# Patient Record
Sex: FEMALE | Race: BLACK OR AFRICAN AMERICAN | Employment: OTHER | URBAN - METROPOLITAN AREA
[De-identification: names, ages, dates, MRNs, and addresses within clinical notes are randomized per-mention and may not be internally consistent; named-entity substitution may affect disease eponyms.]

---

## 2017-05-15 ENCOUNTER — HOSPITAL ENCOUNTER (OUTPATIENT)
Dept: GENERAL RADIOLOGY | Age: 82
Discharge: HOME | End: 2017-05-15
Payer: MEDICARE

## 2017-05-15 ENCOUNTER — OFFICE VISIT (OUTPATIENT)
Dept: NEUROLOGY | Age: 82
End: 2017-05-15

## 2017-05-15 VITALS
HEART RATE: 72 BPM | WEIGHT: 144 LBS | DIASTOLIC BLOOD PRESSURE: 62 MMHG | SYSTOLIC BLOOD PRESSURE: 136 MMHG | HEIGHT: 63 IN | OXYGEN SATURATION: 97 % | BODY MASS INDEX: 25.52 KG/M2 | RESPIRATION RATE: 12 BRPM

## 2017-05-15 DIAGNOSIS — G60.8 IDIOPATHIC SMALL AND LARGE FIBER SENSORY NEUROPATHY: Primary | ICD-10-CM

## 2017-05-15 DIAGNOSIS — M54.16 LUMBAR BACK PAIN WITH RADICULOPATHY AFFECTING RIGHT LOWER EXTREMITY: ICD-10-CM

## 2017-05-15 DIAGNOSIS — M54.16 LUMBAR BACK PAIN WITH RADICULOPATHY AFFECTING LEFT LOWER EXTREMITY: ICD-10-CM

## 2017-05-15 DIAGNOSIS — G60.8 IDIOPATHIC SMALL AND LARGE FIBER SENSORY NEUROPATHY: ICD-10-CM

## 2017-05-15 DIAGNOSIS — G56.03 BILATERAL CARPAL TUNNEL SYNDROME: ICD-10-CM

## 2017-05-15 DIAGNOSIS — E11.42 DIABETIC PERIPHERAL NEUROPATHY ASSOCIATED WITH TYPE 2 DIABETES MELLITUS (HCC): ICD-10-CM

## 2017-05-15 DIAGNOSIS — E53.8 B12 DEFICIENCY: ICD-10-CM

## 2017-05-15 DIAGNOSIS — E55.9 VITAMIN D DEFICIENCY: ICD-10-CM

## 2017-05-15 DIAGNOSIS — M47.22 CERVICAL RADICULOPATHY DUE TO OSTEOARTHRITIS OF SPINE: ICD-10-CM

## 2017-05-15 PROCEDURE — 72052 X-RAY EXAM NECK SPINE 6/>VWS: CPT

## 2017-05-15 PROCEDURE — 72110 X-RAY EXAM L-2 SPINE 4/>VWS: CPT

## 2017-05-15 RX ORDER — GABAPENTIN 100 MG/1
100 CAPSULE ORAL 3 TIMES DAILY
Qty: 100 CAP | Refills: 9 | Status: SHIPPED | OUTPATIENT
Start: 2017-05-15 | End: 2018-06-27 | Stop reason: SDUPTHER

## 2017-05-15 RX ORDER — METFORMIN HYDROCHLORIDE 500 MG/1
TABLET, EXTENDED RELEASE ORAL
Refills: 0 | COMMUNITY
Start: 2017-04-03 | End: 2020-01-27 | Stop reason: ALTCHOICE

## 2017-05-15 NOTE — PATIENT INSTRUCTIONS

## 2017-05-15 NOTE — PROCEDURES
[facility] at [address] [facility] [address] [facility], [address] [facility]  Tel [phone]     Fax [phone]  Electrodiagnostic Study Report  Test Date:  5/15/2017    Patient: Jennifer Chen : 1932 Physician: Jazmin Montoya MD   Sex: Male  < Ref Phys: Cheo Patton     Clinical Indication: Patient is an 60-year-old -American female with numbness of both her hands and burning numbness in her feet, recent diagnosis of diabetes, EMG study to rule out neuropathy, rule out carpal tunnel syndrome, rule out cervical and lumbar radiculopathy. Patient was history of increasing neck pain worse on the left side. Impression: This study shows electrophysiologic evidence of    1). A very mild entrapment of the median nerve at the wrist on the right side consistent with a very mild carpal tunnel syndrome, as manifest only by the mildly prolonged distal sensory latency of the median nerve, with normal distal motor latency and normal EMG study of the abductor pollicis brevis on the right. 2). A mild length dependent distal axonal and probably mildly demyelinating polyneuropathy in both lower extremities consistent with possible various toxic, metabolic or acquired neuropathies, and possibly consistent with the patient's known history of diabetes. There was no clear evidence of either cervical or lumbar motor radiculopathy in the arms or legs as above on the basis of this study. Clinical correlation recommended, and correlation with imaging modalities and metabolic studies would also be of further diagnostic benefit. EMG & NCV Findings:  Evaluation of the left radial motor nerve showed prolonged distal onset latency (13.8 ms). The right median sensory nerve showed prolonged distal peak latency (3.6 ms). All remaining nerves (as indicated in the following tables) were within normal limits. All left vs. right side differences were within normal limits. All F Wave latencies were within normal limits.         Nerve Conduction Studies  Anti Sensory Summary Table     Stim Site NR Peak (ms) P-T Amp (µV) Site1 Site2 Delta-P (ms) Dist (cm) Vijay (m/s)   Right Median Anti Sensory (2nd Digit)  31.5°C   Wrist    3.6 29.5 Wrist 2nd Digit 3.6 14.0 39   Left Radial Anti Sensory (Base 1st Digit)  29.9°C   Wrist    2.2 23.7 Wrist Base 1st Digit 2.2 0.0    Right Sural Anti Sensory (Lat Mall)  30.3°C   Calf    3.5 13.6 Calf Lat Mall 3.5 14.0 40   Left Ulnar Anti Sensory (5th Digit)  30.1°C   Wrist    3.5 22.2 Wrist 5th Digit 3.5 14.0 40   Right Ulnar Anti Sensory (5th Digit)  31.7°C   Wrist    3.2 37.9 Wrist 5th Digit 3.2 14.0 44     Ortho Sensory Summary Table     Stim Site NR Peak (ms) P-T Amp (µV) Site1 Site2 Delta-P (ms) Dist (cm) Vijay (m/s)   Left Lateral Plantar Ortho Sensory (Med Malleolus)  30.5°C   Digit 5    3.4 7.4 Digit 5 Med Malleolus 3.4 0.0    Site 2    4.0 3.8        Right Medial Plantar Ortho Sensory (Med Malleolus)  30.2°C   Digit 1    4.2 4.8 Digit 1 Med Malleolus 4.2 0.0    Site 2    4.1 2.8          Motor Summary Table     Stim Site NR Onset (ms) O-P Amp (mV) Site1 Site2 Delta-0 (ms) Dist (cm) Vijay (m/s)   Right Fibular Motor (Ext Dig Brev)  30.1°C   Ankle    4.5 4.5 B Fib Ankle 7.1 30.0 42   B Fib    11.6 4.7 Poplt B Fib 2.2 10.0 45   Poplt    13.8 0.3        Left Median Motor (Abd Poll Brev)  30.3°C   Wrist    3.2 9.2 Elbow Wrist 4.5 25.5 61T   Elbow    7.7 8.2        Right Median Motor (Abd Poll Brev)  29.7°C   Wrist    3.5 7.5 Elbow Wrist 4.9 25.5 57T   Elbow    8.4 6.9        Left Radial Motor (Ext Ind Prop)  29.7°C   8cm    13.8         Right Tibial Motor (Abd Wan Brev)  30.5°C   Ankle    3.6 10.3 Knee Ankle 8.6 38.0 44   Knee    12.2 8.4        Right Ulnar Motor (Abd Dig Minimi)  32.1°C   Wrist    2.7 10.0 B Elbow Wrist 3.6 21.0 58   B Elbow    6.3 9.3 A Elbow B Elbow 1.7 10.0 59   A Elbow    8.0 9.5          Comparison Summary Table     Stim Site NR Peak (ms) P-T Amp (µV) Site1 Site2 Delta-P (ms)   Left Median/Ulnar Palm Comparison (Wrist - 8cm)  24.9°C   Median Palm    2.0 62.0 Median Palm Ulnar Palm 0.1   Ulnar Palm    2.1 10.4      Right Median/Ulnar Palm Comparison (Wrist - 8cm)  31.9°C   Median Palm    2.3 21.4 Median Palm Ulnar Palm 0.2   Ulnar Palm    2.1 9.0        F Wave Studies     NR F-Lat (ms) L-R F-Lat (ms)   Right Median (Mrkrs) (Abd Poll Brev)  31.7°C      30.71      EMG     Side Muscle Nerve Root Ins Act Fibs Psw Amp Dur Poly Recrt Comment   Right Abd Poll Brev Median C8-T1 Nml Nml Nml Nml Nml 0 Nml    Right 1stDorInt Ulnar C8-T1 Nml Nml Nml Nml Nml 0 Nml    Right Biceps Musculocut C5-6 Nml Nml Nml Nml Nml 0 Nml    Right Triceps Radial C6-7-8 Nml Nml Nml Nml Nml 0 Nml    Right Deltoid Axillary C5-6 Nml Nml Nml Nml Nml 0 Nml    Right Cervical Parasp Mid Rami C4-6 Nml Nml Nml        Right AntTibialis Dp Br Fibular L4-5 Nml Nml Nml Nml Nml 0 Nml    Right Gastroc Tibial S1-2 Nml Nml Nml Nml Nml 0 Nml    Right VastusLat Femoral L2-4 Nml Nml Nml Nml Nml 0 Nml    Right Lumbo Parasp Low Rami L5-S1 Nml Nml Nml        Right AbdHallucis MedPlantar S1-2 Nml Nml Nml Incr >12ms 1+ Nml    Right Ext Dig Brev Dp Br Fibular L5, S1 Nml Nml Nml Incr >12ms 1+ Nml    Left AntTibialis Dp Br Fibular L4-5 Nml Nml Nml Nml Nml 0 Nml    Left Gastroc Tibial S1-2 Nml Nml Nml Nml Nml 0 Nml    Left VastusLat Femoral L2-4 Nml Nml Nml Nml Nml 0 Nml    Left Lumbo Parasp Low Rami L5-S1 Nml Nml Nml        Left Ext Dig Brev Dp Br Fibular L5, S1 Nml Nml Nml Incr >12ms 1+ Nml    Left AbdHallucis MedPlantar S1-2 Nml Nml Nml Incr >12ms 1+ Nml    Left Abd Poll Brev Median C8-T1 Nml Nml Nml Nml Nml 0 Nml    Left 1stDorInt Ulnar C8-T1 Nml Nml Nml Nml Nml 0 Nml    Left Biceps Musculocut C5-6 Nml Nml Nml Nml Nml 0 Nml    Left Triceps Radial C6-7-8 Nml Nml Nml Nml Nml 0 Nml    Left Deltoid Axillary C5-6 Nml Nml Nml Nml Nml 0 Nml    Left Cervical Parasp Mid Rami C4-6 Nml Nml Nml Waveforms:                                              __________________  Dolores Castañeda M.D.

## 2017-05-15 NOTE — PROGRESS NOTES
Consult  REFERRED BY:  Arturo Lafleur MD    CHIEF COMPLAINT: Numbness in hands and feet      Subjective:     Malcolm Gutierrez is a 80 y.o. right-handed -American female seen today as a new patient to me for evaluation of a new progressive problem of increasing numbness in her hands and feet at the request of Dr. Alvaro Hand. Patient states that she was recently found to be diabetic about 2 months ago and started on metformin. She had a hemoglobin A1c of 6.8 in August of last year and mildly elevated blood sugars on the chart. She has no family history of diabetes and no prior history of diabetes. She says the numbness seems to be worse in the left hand involving her fourth and fifth finger, and in both feet mainly on the bottom of the feet and more in the little toe in the right foot and the big toe and the left foot. She frequently crosses her legs, and we advised her to try to avoid this. It is associated with a somewhat burning pain on the bottom of her feet at times, but she sleeps pretty well. She does not really have that much back pain but does have some neck pain that seems to radiate more to the left side and seems to have some fullness in the left supraclavicular area. She has had no recent trauma, fever, meningismus or evidence of any causes for the numbness. She has no toxin exposure, chemical exposure, or family history of similar problems. Her bowel and bladder function remained stable. She has had no cranial nerve symptoms and no major headaches. She continues to remain mentally and physically active but does not take any vitamins. She eats well and has good nutrition and tries to remain mentally and physically active. She has no major gait problems or balance problems except for the arthritis in her knees bilaterally, with the right having previous knee surgery one year ago.     Past Medical History:   Diagnosis Date    Arthritis     GERD (gastroesophageal reflux disease)     Hypertension     Psychiatric disorder     anxiety      Past Surgical History:   Procedure Laterality Date    HX CHOLECYSTECTOMY      HX ÓSCAR AND BSO      CO COLONOSCOPY FLX DX W/COLLJ SPEC WHEN PFRMD  11/21/2013         UPPER GI ENDOSCOPY,BIOPSY  11/3/2015          Family History   Problem Relation Age of Onset    No Known Problems Mother     No Known Problems Father     Heart Disease Child     Arthritis-osteo Child     Hypertension Child       Social History   Substance Use Topics    Smoking status: Never Smoker    Smokeless tobacco: Never Used    Alcohol use No         Current Outpatient Prescriptions:     metFORMIN ER (GLUCOPHAGE XR) 500 mg tablet, TK 1 T PO ONCE QD WITH DINNER, Disp: , Rfl: 0    gabapentin (NEURONTIN) 100 mg capsule, Take 1 Cap by mouth three (3) times daily. , Disp: 100 Cap, Rfl: 9    hydrochlorothiazide (HYDRODIURIL) 25 mg tablet, Take 1 Tab by mouth daily. Restart when Blood Pressure is greater than 120/80, Disp: , Rfl:     omeprazole (PRILOSEC) 20 mg capsule, Take 20 mg by mouth daily. , Disp: , Rfl:     citalopram (CELEXA) 20 mg tablet, Take 20 mg by mouth daily. , Disp: , Rfl:     atorvastatin (LIPITOR) 20 mg tablet, Take 20 mg by mouth daily. , Disp: , Rfl:         Allergies   Allergen Reactions    Codeine Other (comments)     fainting        Review of Systems:  A comprehensive review of systems was negative except for: Constitutional: positive for fatigue and malaise  Ears, nose, mouth, throat, and face: positive for hearing loss  Musculoskeletal: positive for myalgias, arthralgias, stiff joints and neck pain  Neurological: positive for paresthesia and gait problems  Behvioral/Psych: positive for depression   Vitals:    05/15/17 1019   BP: 136/62   Pulse: 72   Resp: 12   SpO2: 97%   Weight: 144 lb (65.3 kg)   Height: 5' 3\" (1.6 m)     Objective:     I      NEUROLOGICAL EXAM:    Appearance:   The patient is well developed, well nourished, provides a coherent history and is in no acute distress. Mental Status: Oriented to time, place and person, and the president, cognitive function is normal and speech is fluent and no aphasia or dysarthria. Mood and affect appropriate but seems mildly depressed . Cranial Nerves:   Intact visual fields. Fundi are benign. BEKAH, EOM's full, no nystagmus, no ptosis. Facial sensation is normal. Corneal reflexes are not tested. Facial movement is symmetric. Hearing is abnormal bilaterally. Palate is midline with normal sternocleidomastoid and trapezius muscles are normal. Tongue is midline. Neck without meningismus or bruits  Patient has mild pain on range of motion of the neck, slightly more rotation to the left  Patient seems to have a slight fullness in the supraclavicular region on the left side, questionably some soft tissue swelling or lipoma   Patient's temporal arteries are not tender or enlarged  Patient's TMJ areas are not tender or enlarged    Motor:  5/5 strength in upper and lower proximal and distal muscles. Normal bulk and tone. No fasciculations. Reflexes:   Deep tendon reflexes 1+/4 and symmetrical.  No babinski or clonus present  No clear Tinel's over the median nerves bilaterally or peroneal nerves at the fibular heads    Sensory:   Normal to touch, pinprick and vibration. DSS is intact   Gait:  Normal gait except patient walks with a mild limp due to arthritis in her knees and previous right knee surgery . Tremor:   No tremor noted. Cerebellar:  Mildly abnormal Romberg and tandem cerebellar signs present. Neurovascular:  Normal heart sounds and regular rhythm, peripheral pulses decreased, and no carotid bruits. Assessment:       ICD-10-CM ICD-9-CM    1.  Idiopathic small and large fiber sensory neuropathy G60.8 356.4 metFORMIN ER (GLUCOPHAGE XR) 500 mg tablet      EMG LIMITED      EMG NCV MOTOR WO F/WAVE PER NERVE      EMG NCV SENSORY PER NERVE      ARLETTE COMPREHENSIVE PLUS PANEL      VITAMIN B12 & FOLATE VITAMIN D, 25 HYROXY PANEL      IMMUNOELECTROPHORESIS (IMMUNOFIX.)      HEMOGLOBIN A1C WITH EAG      GM1 ANTIBODY IGG, IGM      GLIADIN ABS, IGA AND IGG      MYELIN ASSOC GLYCOPROTEIN AB, IGM      SED RATE (ESR)      RA + CCP ABS      CBC WITH AUTOMATED DIFF      CK      ANTINEURONAL CELL AB      gabapentin (NEURONTIN) 100 mg capsule      XR SPINE CERV W OBL/FLEX/EXT MIN 6 V COMP      XR SPINE LUMB MIN 4 V   2. Diabetic peripheral neuropathy associated with type 2 diabetes mellitus (HCC) E11.42 250.60 metFORMIN ER (GLUCOPHAGE XR) 500 mg tablet     357. 2 EMG LIMITED      EMG NCV MOTOR WO F/WAVE PER NERVE      EMG NCV SENSORY PER NERVE      ARLETTE COMPREHENSIVE PLUS PANEL      VITAMIN B12 & FOLATE      VITAMIN D, 25 HYROXY PANEL      IMMUNOELECTROPHORESIS (IMMUNOFIX.)      HEMOGLOBIN A1C WITH EAG      GM1 ANTIBODY IGG, IGM      GLIADIN ABS, IGA AND IGG      MYELIN ASSOC GLYCOPROTEIN AB, IGM      SED RATE (ESR)      RA + CCP ABS      CBC WITH AUTOMATED DIFF      CK      ANTINEURONAL CELL AB      gabapentin (NEURONTIN) 100 mg capsule      XR SPINE CERV W OBL/FLEX/EXT MIN 6 V COMP      XR SPINE LUMB MIN 4 V   3. Vitamin D deficiency E55.9 268.9 metFORMIN ER (GLUCOPHAGE XR) 500 mg tablet      EMG LIMITED      EMG NCV MOTOR WO F/WAVE PER NERVE      EMG NCV SENSORY PER NERVE      ARLETTE COMPREHENSIVE PLUS PANEL      VITAMIN B12 & FOLATE      VITAMIN D, 25 HYROXY PANEL      IMMUNOELECTROPHORESIS (IMMUNOFIX.)      HEMOGLOBIN A1C WITH EAG      GM1 ANTIBODY IGG, IGM      GLIADIN ABS, IGA AND IGG      MYELIN ASSOC GLYCOPROTEIN AB, IGM      SED RATE (ESR)      RA + CCP ABS      CBC WITH AUTOMATED DIFF      CK      ANTINEURONAL CELL AB      gabapentin (NEURONTIN) 100 mg capsule      XR SPINE CERV W OBL/FLEX/EXT MIN 6 V COMP      XR SPINE LUMB MIN 4 V   4.  B12 deficiency E53.8 266.2 metFORMIN ER (GLUCOPHAGE XR) 500 mg tablet      EMG LIMITED      EMG NCV MOTOR WO F/WAVE PER NERVE      EMG NCV SENSORY PER NERVE      ARLETTE COMPREHENSIVE PLUS PANEL      VITAMIN B12 & FOLATE      VITAMIN D, 25 HYROXY PANEL      IMMUNOELECTROPHORESIS (IMMUNOFIX.)      HEMOGLOBIN A1C WITH EAG      GM1 ANTIBODY IGG, IGM      GLIADIN ABS, IGA AND IGG      MYELIN ASSOC GLYCOPROTEIN AB, IGM      SED RATE (ESR)      RA + CCP ABS      CBC WITH AUTOMATED DIFF      CK      ANTINEURONAL CELL AB      gabapentin (NEURONTIN) 100 mg capsule      XR SPINE CERV W OBL/FLEX/EXT MIN 6 V COMP      XR SPINE LUMB MIN 4 V   5. Lumbar back pain with radiculopathy affecting right lower extremity M54.17 724.4 metFORMIN ER (GLUCOPHAGE XR) 500 mg tablet      EMG LIMITED      EMG NCV MOTOR WO F/WAVE PER NERVE      EMG NCV SENSORY PER NERVE      ARLETTE COMPREHENSIVE PLUS PANEL      VITAMIN B12 & FOLATE      VITAMIN D, 25 HYROXY PANEL      IMMUNOELECTROPHORESIS (IMMUNOFIX.)      HEMOGLOBIN A1C WITH EAG      GM1 ANTIBODY IGG, IGM      GLIADIN ABS, IGA AND IGG      MYELIN ASSOC GLYCOPROTEIN AB, IGM      SED RATE (ESR)      RA + CCP ABS      CBC WITH AUTOMATED DIFF      CK      ANTINEURONAL CELL AB      gabapentin (NEURONTIN) 100 mg capsule      XR SPINE CERV W OBL/FLEX/EXT MIN 6 V COMP      XR SPINE LUMB MIN 4 V   6. Lumbar back pain with radiculopathy affecting left lower extremity M54.17 724.4 metFORMIN ER (GLUCOPHAGE XR) 500 mg tablet      EMG LIMITED      EMG NCV MOTOR WO F/WAVE PER NERVE      EMG NCV SENSORY PER NERVE      ARLETTE COMPREHENSIVE PLUS PANEL      VITAMIN B12 & FOLATE      VITAMIN D, 25 HYROXY PANEL      IMMUNOELECTROPHORESIS (IMMUNOFIX.)      HEMOGLOBIN A1C WITH EAG      GM1 ANTIBODY IGG, IGM      GLIADIN ABS, IGA AND IGG      MYELIN ASSOC GLYCOPROTEIN AB, IGM      SED RATE (ESR)      RA + CCP ABS      CBC WITH AUTOMATED DIFF      CK      ANTINEURONAL CELL AB      gabapentin (NEURONTIN) 100 mg capsule      XR SPINE CERV W OBL/FLEX/EXT MIN 6 V COMP      XR SPINE LUMB MIN 4 V   7.  Cervical radiculopathy due to osteoarthritis of spine M47.22 721.0 metFORMIN ER (GLUCOPHAGE XR) 500 mg tablet EMG LIMITED      EMG NCV MOTOR WO F/WAVE PER NERVE      EMG NCV SENSORY PER NERVE      ARLETTE COMPREHENSIVE PLUS PANEL      VITAMIN B12 & FOLATE      VITAMIN D, 25 HYROXY PANEL      IMMUNOELECTROPHORESIS (IMMUNOFIX.)      HEMOGLOBIN A1C WITH EAG      GM1 ANTIBODY IGG, IGM      GLIADIN ABS, IGA AND IGG      MYELIN ASSOC GLYCOPROTEIN AB, IGM      SED RATE (ESR)      RA + CCP ABS      CBC WITH AUTOMATED DIFF      CK      ANTINEURONAL CELL AB      gabapentin (NEURONTIN) 100 mg capsule      XR SPINE CERV W OBL/FLEX/EXT MIN 6 V COMP      XR SPINE LUMB MIN 4 V   8. Bilateral carpal tunnel syndrome G56.03 354.0 metFORMIN ER (GLUCOPHAGE XR) 500 mg tablet      EMG LIMITED      EMG NCV MOTOR WO F/WAVE PER NERVE      EMG NCV SENSORY PER NERVE      ARLETTE COMPREHENSIVE PLUS PANEL      VITAMIN B12 & FOLATE      VITAMIN D, 25 HYROXY PANEL      IMMUNOELECTROPHORESIS (IMMUNOFIX.)      HEMOGLOBIN A1C WITH EAG      GM1 ANTIBODY IGG, IGM      GLIADIN ABS, IGA AND IGG      MYELIN ASSOC GLYCOPROTEIN AB, IGM      SED RATE (ESR)      RA + CCP ABS      CBC WITH AUTOMATED DIFF      CK      ANTINEURONAL CELL AB      gabapentin (NEURONTIN) 100 mg capsule      XR SPINE CERV W OBL/FLEX/EXT MIN 6 V COMP      XR SPINE LUMB MIN 4 V         Plan:     Patient had an EMG study today that did document a very mild distal axonal neuropathy that would be consistent probably with her history of diabetes, need to rule out other causes. Patient had complete metabolic studies done today to rule out other treatable causes of neuropathy  Patient's EMG study did not document carpal tunnel syndrome except on the right side which was minimal  No clear evidence of motor cervical radiculopathy, and patient encouraged not to cross her legs. Patient encouraged to take a multivitamin every day and vitamin D, remain mentally and physically active  Patient will get cervical spine x-rays and lumbar spine x-rays to rule out other treatable causes of her symptoms.   She was given Neurontin 100 mg 3 times a day take as needed for the pain. We will see her back again in 3-6 months time or earlier as needed and patient was advised of the side effects of Neurontin as far as dizziness, sedation, or any side effects to call us immediately. Her condition also discussed with her daughter in detail today. We did advise her at the single most important thing for controlling her neuropathy was to control her blood sugars. Signed By: Oziel Dietz MD     May 15, 2017       CC: Guillermo De Souza MD  FAX: [phone]    This note will not be viewable in [address]

## 2017-05-15 NOTE — MR AVS SNAPSHOT
Visit Information Date & Time Provider Department Dept. Phone Encounter #  
 5/15/2017 10:00 AM Daniele Adames MD [facility] [phone] [id number] Follow-up Instructions Return in about 6 months (around 11/15/2017). Upcoming Health Maintenance Date Due DTaP/Tdap/Td series (1 - Tdap) 9/22/1953 ZOSTER VACCINE AGE 60> 9/22/1992 GLAUCOMA SCREENING Q2Y 9/22/1997 OSTEOPOROSIS SCREENING (DEXA) 9/22/1997 Pneumococcal 65+ Low/Medium Risk (1 of 2 - PCV13) 9/22/1997 MEDICARE YEARLY EXAM 9/22/1997 INFLUENZA AGE 9 TO ADULT 8/1/2017 Allergies as of 5/15/2017  Review Complete On: 5/15/2017 By: Daniele Adames MD  
  
 Severity Noted Reaction Type Reactions Codeine High 11/20/2013    Other (comments)  
 fainting Current Immunizations  Never Reviewed No immunizations on file. Not reviewed this visit You Were Diagnosed With   
  
 Codes Comments Idiopathic small and large fiber sensory neuropathy    -  Primary ICD-10-CM: G60.8 ICD-9-CM: 356.4 Diabetic peripheral neuropathy associated with type 2 diabetes mellitus (HCC)     ICD-10-CM: E11.42 
ICD-9-CM: 250.60, 357.2 Vitamin D deficiency     ICD-10-CM: E55.9 ICD-9-CM: 268.9 B12 deficiency     ICD-10-CM: E53.8 ICD-9-CM: 266.2 Lumbar back pain with radiculopathy affecting right lower extremity     ICD-10-CM: M54.17 ICD-9-CM: 724.4 Lumbar back pain with radiculopathy affecting left lower extremity     ICD-10-CM: M54.17 ICD-9-CM: 724.4 Cervical radiculopathy due to osteoarthritis of spine     ICD-10-CM: M47.22 
ICD-9-CM: 721.0 Bilateral carpal tunnel syndrome     ICD-10-CM: G56.03 
ICD-9-CM: 354.0 Vitals BP Pulse Resp Height(growth percentile) Weight(growth percentile) SpO2  
 136/62 72 12 5' 3\" (1.6 m) 144 lb (65.3 kg) 97% BMI OB Status Smoking Status 25.51 kg/m2 Hysterectomy Never Smoker Vitals History BMI and BSA Data Body Mass Index Body Surface Area 25.51 kg/m 2 1.7 m 2 Preferred Pharmacy Pharmacy Name Phone [facility] [phone] [facility] [phone] Your Updated Medication List  
  
   
This list is accurate as of: 5/15/17 10:52 AM.  Always use your most recent med list.  
  
  
  
  
 atorvastatin 20 mg tablet Commonly known as:  LIPITOR Take 20 mg by mouth daily. citalopram 20 mg tablet Commonly known as:  Gaurav Shows Take 20 mg by mouth daily. gabapentin 100 mg capsule Commonly known as:  NEURONTIN Take 1 Cap by mouth three (3) times daily. hydroCHLOROthiazide 25 mg tablet Commonly known as:  HYDRODIURIL Take 1 Tab by mouth daily. Restart when Blood Pressure is greater than 120/80  
  
 metFORMIN  mg tablet Commonly known as:  GLUCOPHAGE XR  
TK 1 T PO ONCE QD WITH DINNER  
  
 omeprazole 20 mg capsule Commonly known as:  PRILOSEC Take 20 mg by mouth daily. Prescriptions Sent to Pharmacy Refills  
 gabapentin (NEURONTIN) 100 mg capsule 9 Sig: Take 1 Cap by mouth three (3) times daily. Class: Normal  
 Pharmacy: [facility] [address] [place] [address] RD AT [address] [place] [address] Ph #: [phone] Route: Oral  
  
We Performed the Following ARLETTE COMPREHENSIVE PLUS PANEL [[id number] Custom] ANTINEURONAL CELL AB [id number] CPT(R)] CBC WITH AUTOMATED DIFF [47499 CPT(R)] CK [id number] CPT(R)] GLIADIN ABS, IGA AND IGG [[id number] Custom] GM1 ANTIBODY IGG, IGM [37056 CPT(R)] HEMOGLOBIN A1C WITH EAG [71880 CPT(R)] IMMUNOELECTROPHORESIS [facility].) [id number] CPT(R)] MYELIN ASSOC GLYCOPROTEIN AB, IGM [id number] CPT(R)]   
 RA + CCP ABS [[id number] Custom] SED RATE (ESR) [id number] CPT(R)] VITAMIN B12 & FOLATE [72335 CPT(R)] VITAMIN D, 25 HYROXY PANEL [[id number] Custom] Follow-up Instructions Return in about 6 months (around 11/15/2017). To-Do List   
 05/15/2017 Neurology:  EMG LIMITED   
  
 05/15/2017 Neurology:  EMG NCV MOTOR WO F/WAVE PER NERVE   
  
 05/15/2017 Neurology:  EMG NCV SENSORY PER NERVE   
  
 05/15/2017 Imaging:  XR SPINE CERV W OBL/FLEX/EXT MIN 6 V COMP   
  
 05/15/2017 Imaging:  XR SPINE LUMB MIN 4 V Patient Instructions A Healthy Lifestyle: Care Instructions Your Care Instructions A healthy lifestyle can help you feel good, stay at a healthy weight, and have plenty of energy for both work and play. A healthy lifestyle is something you can share with your whole family. A healthy lifestyle also can lower your risk for serious health problems, such as high blood pressure, heart disease, and diabetes. You can follow a few steps listed below to improve your health and the health of your family. Follow-up care is a key part of your treatment and safety. Be sure to make and go to all appointments, and call your doctor if you are having problems. Its also a good idea to know your test results and keep a list of the medicines you take. How can you care for yourself at home? · Do not eat too much sugar, fat, or fast foods. You can still have dessert and treats now and then. The goal is moderation. · Start small to improve your eating habits. Pay attention to portion sizes, drink less juice and soda pop, and eat more fruits and vegetables. ¨ Eat a healthy amount of food. A 3-ounce serving of meat, for example, is about the size of a deck of cards. Fill the rest of your plate with vegetables and whole grains. ¨ Limit the amount of soda and sports drinks you have every day. Drink more water when you are thirsty. ¨ Eat at least 5 servings of fruits and vegetables every day.  It may seem like a lot, but it is not hard to reach this goal. A serving or helping is 1 piece of fruit, 1 cup of vegetables, or 2 cups of leafy, raw vegetables. Have an apple or some carrot sticks as an afternoon snack instead of a candy bar. Try to have fruits and/or vegetables at every meal. 
· Make exercise part of your daily routine. You may want to start with simple activities, such as walking, bicycling, or slow swimming. Try to be active 30 to 60 minutes every day. You do not need to do all 30 to 60 minutes all at once. For example, you can exercise 3 times a day for 10 or 20 minutes. Moderate exercise is safe for most people, but it is always a good idea to talk to your doctor before starting an exercise program. 
· Keep moving. Alden Crooked the lawn, work in the garden, or [place]. Take the stairs instead of the elevator at work. · If you smoke, quit. People who smoke have an increased risk for heart attack, stroke, cancer, and other lung illnesses. Quitting is hard, but there are ways to boost your chance of quitting tobacco for good. ¨ Use nicotine gum, patches, or lozenges. ¨ Ask your doctor about stop-smoking programs and medicines. ¨ Keep trying. In addition to reducing your risk of diseases in the future, you will notice some benefits soon after you stop using tobacco. If you have shortness of breath or asthma symptoms, they will likely get better within a few weeks after you quit. · Limit how much alcohol you drink. Moderate amounts of alcohol (up to 2 drinks a day for men, 1 drink a day for women) are okay. But drinking too much can lead to liver problems, high blood pressure, and other health problems. Family health If you have a family, there are many things you can do together to improve your health. · Eat meals together as a family as often as possible. · Eat healthy foods. This includes fruits, vegetables, lean meats and dairy, and whole grains. · Include your family in your fitness plan.  Most people think of activities such as jogging or tennis as the way to fitness, but there are many ways you and your family can be more active. Anything that makes you breathe hard and gets your heart pumping is exercise. Here are some tips: 
¨ Walk to do errands or to take your child to school or the bus. ¨ Go for a family bike ride after dinner instead of watching TV. Where can you learn more? Go to http://anna-carlos.info/. Enter J470 in the search box to learn more about \"A Healthy Lifestyle: Care Instructions. \" Current as of: July 26, 2016 Content Version: 11.2 © 7248-5434 [employer]. Care instructions adapted under license by [employer] (which disclaims liability or warranty for this information). If you have questions about a medical condition or this instruction, always ask your healthcare professional. [address] any warranty or liability for your use of this information. Introducing [facility]! Tanis Epley introduces Kamibu patient portal. Now you can access parts of your medical record, email your doctor's office, and request medication refills online. 1. In your internet browser, go to [email or web address] 2. Click on the First Time User? Click Here link in the Sign In box. You will see the New Member Sign Up page. 3. Enter your Kamibu Access Code exactly as it appears below. You will not need to use this code after youve completed the sign-up process. If you do not sign up before the expiration date, you must request a new code. · Kam[employer]u Access Code: [email or web address] Expires: 8/13/2017 10:09 AM 
 
4. Enter the last four digits of your Social Security Number (xxxx) and Date of Birth (mm/dd/yyyy) as indicated and click Submit. You will be taken to the next sign-up page. 5. Create a Kamibu ID. This will be your Kamibu login ID and cannot be changed, so think of one that is secure and easy to remember. 6. Create a FoxyTasks password. You can change your password at any time. 7. Enter your Password Reset Question and Answer. This can be used at a later time if you forget your password. 8. Enter your e-mail address. You will receive e-mail notification when new information is available in [address] 9. Click Sign Up. You can now view and download portions of your medical record. 10. Click the Download Summary menu link to download a portable copy of your medical information. If you have questions, please visit the Frequently Asked Questions section of the [employer] website. Remember, FoxyTasks is NOT to be used for urgent needs. For medical emergencies, dial 911. Now available from your iPhone and Android! Please provide this summary of care documentation to your next provider. Your primary care clinician is listed as Brenda Olsen. If you have any questions after today's visit, please call [phone].

## 2017-05-15 NOTE — LETTER
5/15/2017 1:11 PM 
 
Patient:  Linda Shepherd YOB: 1932 Date of Visit: 5/15/2017 Dear No Recipients: Thank you for referring Ms. Linda Shepherd to me for evaluation/treatment. Below are the relevant portions of my assessment and plan of care. Consult REFERRED BY: 
Sebastian Quevedo MD 
 
CHIEF COMPLAINT: Numbness in hands and feet Subjective:  
 
Linda Shepherd is a 80 y.o. right-handed -American female seen today as a new patient to me for evaluation of a new progressive problem of increasing numbness in her hands and feet at the request of Dr. Tres Stone. Patient states that she was recently found to be diabetic about 2 months ago and started on metformin. She had a hemoglobin A1c of 6.8 in August of last year and mildly elevated blood sugars on the chart. She has no family history of diabetes and no prior history of diabetes. She says the numbness seems to be worse in the left hand involving her fourth and fifth finger, and in both feet mainly on the bottom of the feet and more in the little toe in the right foot and the big toe and the left foot. She frequently crosses her legs, and we advised her to try to avoid this. It is associated with a somewhat burning pain on the bottom of her feet at times, but she sleeps pretty well. She does not really have that much back pain but does have some neck pain that seems to radiate more to the left side and seems to have some fullness in the left supraclavicular area. She has had no recent trauma, fever, meningismus or evidence of any causes for the numbness. She has no toxin exposure, chemical exposure, or family history of similar problems. Her bowel and bladder function remained stable. She has had no cranial nerve symptoms and no major headaches. She continues to remain mentally and physically active but does not take any vitamins.   She eats well and has good nutrition and tries to remain mentally and physically active. She has no major gait problems or balance problems except for the arthritis in her knees bilaterally, with the right having previous knee surgery one year ago. Past Medical History:  
Diagnosis Date  Arthritis  GERD (gastroesophageal reflux disease)  Hypertension  Psychiatric disorder   
 anxiety Past Surgical History:  
Procedure Laterality Date  HX CHOLECYSTECTOMY  HX ÓSCAR AND BSO  OH COLONOSCOPY FLX DX W/COLLJ SPEC WHEN PFRMD  11/21/2013  UPPER GI ENDOSCOPY,BIOPSY  11/3/2015 Family History Problem Relation Age of Onset  No Known Problems Mother  No Known Problems Father  Heart Disease Child  Arthritis-osteo Child  Hypertension Child Social History Substance Use Topics  Smoking status: Never Smoker  Smokeless tobacco: Never Used  Alcohol use No  
   
 
Current Outpatient Prescriptions:  
  metFORMIN ER (GLUCOPHAGE XR) 500 mg tablet, TK 1 T PO ONCE QD WITH DINNER, Disp: , Rfl: 0 
  gabapentin (NEURONTIN) 100 mg capsule, Take 1 Cap by mouth three (3) times daily. , Disp: 100 Cap, Rfl: 9 
  hydrochlorothiazide (HYDRODIURIL) 25 mg tablet, Take 1 Tab by mouth daily. Restart when Blood Pressure is greater than 120/80, Disp: , Rfl:  
  omeprazole (PRILOSEC) 20 mg capsule, Take 20 mg by mouth daily. , Disp: , Rfl:  
  citalopram (CELEXA) 20 mg tablet, Take 20 mg by mouth daily. , Disp: , Rfl:  
  atorvastatin (LIPITOR) 20 mg tablet, Take 20 mg by mouth daily. , Disp: , Rfl:  
 
 
 
Allergies Allergen Reactions  Codeine Other (comments)  
  fainting Review of Systems: A comprehensive review of systems was negative except for: Constitutional: positive for fatigue and malaise Ears, nose, mouth, throat, and face: positive for hearing loss Musculoskeletal: positive for myalgias, arthralgias, stiff joints and neck pain Neurological: positive for paresthesia and gait problems Behvioral/Psych: positive for depression Vitals:  
 05/15/17 1019 BP: 136/62 Pulse: 72 Resp: 12 SpO2: 97% Weight: 144 lb (65.3 kg) Height: 5' 3\" (1.6 m) Objective: I 
 
 
NEUROLOGICAL EXAM: 
 
Appearance: The patient is well developed, well nourished, provides a coherent history and is in no acute distress. Mental Status: Oriented to time, place and person, and the president, cognitive function is normal and speech is fluent and no aphasia or dysarthria. Mood and affect appropriate but seems mildly depressed . Cranial Nerves:   Intact visual fields. Fundi are benign. BEKAH, EOM's full, no nystagmus, no ptosis. Facial sensation is normal. Corneal reflexes are not tested. Facial movement is symmetric. Hearing is abnormal bilaterally. Palate is midline with normal sternocleidomastoid and trapezius muscles are normal. Tongue is midline. Neck without meningismus or bruits Patient has mild pain on range of motion of the neck, slightly more rotation to the left Patient seems to have a slight fullness in the supraclavicular region on the left side, questionably some soft tissue swelling or lipoma Patient's temporal arteries are not tender or enlarged Patient's TMJ areas are not tender or enlarged Motor:  5/5 strength in upper and lower proximal and distal muscles. Normal bulk and tone. No fasciculations. Reflexes:   Deep tendon reflexes 1+/4 and symmetrical. 
No babinski or clonus present No clear Tinel's over the median nerves bilaterally or peroneal nerves at the fibular heads Sensory:   Normal to touch, pinprick and vibration. DSS is intact Gait:  Normal gait except patient walks with a mild limp due to arthritis in her knees and previous right knee surgery . Tremor:   No tremor noted. Cerebellar:  Mildly abnormal Romberg and tandem cerebellar signs present. Neurovascular:  Normal heart sounds and regular rhythm, peripheral pulses decreased, and no carotid bruits. Assessment: ICD-10-CM ICD-9-CM 1. Idiopathic small and large fiber sensory neuropathy G60.8 356.4 metFORMIN ER (GLUCOPHAGE XR) 500 mg tablet EMG LIMITED  
   EMG NCV MOTOR WO F/WAVE PER NERVE  
   EMG NCV SENSORY PER NERVE  
   ARLETTE COMPREHENSIVE PLUS PANEL  
   VITAMIN B12 & FOLATE  
   VITAMIN D, 25 HYROXY PANEL  
   IMMUNOELECTROPHORESIS (IMMUNOFIX.) HEMOGLOBIN A1C WITH EAG  
   GM1 ANTIBODY IGG, IGM  
   GLIADIN ABS, IGA AND IGG MYELIN ASSOC GLYCOPROTEIN AB, IGM  
   SED RATE (ESR)  
   RA + CCP ABS  
   CBC WITH AUTOMATED DIFF  
   CK ANTINEURONAL CELL AB  
   gabapentin (NEURONTIN) 100 mg capsule XR SPINE CERV W OBL/FLEX/EXT MIN 6 V COMP  
   XR SPINE LUMB MIN 4 V  
2. Diabetic peripheral neuropathy associated with type 2 diabetes mellitus (HCC) E11.42 250.60 metFORMIN ER (GLUCOPHAGE XR) 500 mg tablet  
  357. 2 EMG LIMITED  
   EMG NCV MOTOR WO F/WAVE PER NERVE  
   EMG NCV SENSORY PER NERVE  
   ARLETTE COMPREHENSIVE PLUS PANEL  
   VITAMIN B12 & FOLATE  
   VITAMIN D, 25 HYROXY PANEL  
   IMMUNOELECTROPHORESIS (IMMUNOFIX.) HEMOGLOBIN A1C WITH EAG  
   GM1 ANTIBODY IGG, IGM  
   GLIADIN ABS, IGA AND IGG MYELIN ASSOC GLYCOPROTEIN AB, IGM  
   SED RATE (ESR)  
   RA + CCP ABS  
   CBC WITH AUTOMATED DIFF  
   CK ANTINEURONAL CELL AB  
   gabapentin (NEURONTIN) 100 mg capsule XR SPINE CERV W OBL/FLEX/EXT MIN 6 V COMP  
   XR SPINE LUMB MIN 4 V  
3. Vitamin D deficiency E55.9 268.9 metFORMIN ER (GLUCOPHAGE XR) 500 mg tablet EMG LIMITED  
   EMG NCV MOTOR WO F/WAVE PER NERVE  
   EMG NCV SENSORY PER NERVE  
   ARLETTE COMPREHENSIVE PLUS PANEL  
   VITAMIN B12 & FOLATE  
   VITAMIN D, 25 HYROXY PANEL  
   IMMUNOELECTROPHORESIS (IMMUNOFIX.)    HEMOGLOBIN A1C WITH EAG  
   GM1 ANTIBODY IGG, IGM  
   GLIADIN ABS, IGA AND IGG  
 MYELIN ASSOC GLYCOPROTEIN AB, IGM  
   SED RATE (ESR)  
   RA + CCP ABS  
   CBC WITH AUTOMATED DIFF  
   CK ANTINEURONAL CELL AB  
   gabapentin (NEURONTIN) 100 mg capsule XR SPINE CERV W OBL/FLEX/EXT MIN 6 V COMP  
   XR SPINE LUMB MIN 4 V  
4. B12 deficiency E53.8 266.2 metFORMIN ER (GLUCOPHAGE XR) 500 mg tablet EMG LIMITED  
   EMG NCV MOTOR WO F/WAVE PER NERVE  
   EMG NCV SENSORY PER NERVE  
   ARLETTE COMPREHENSIVE PLUS PANEL  
   VITAMIN B12 & FOLATE  
   VITAMIN D, 25 HYROXY PANEL  
   IMMUNOELECTROPHORESIS (IMMUNOFIX.) HEMOGLOBIN A1C WITH EAG  
   GM1 ANTIBODY IGG, IGM  
   GLIADIN ABS, IGA AND IGG MYELIN ASSOC GLYCOPROTEIN AB, IGM  
   SED RATE (ESR)  
   RA + CCP ABS  
   CBC WITH AUTOMATED DIFF  
   CK ANTINEURONAL CELL AB  
   gabapentin (NEURONTIN) 100 mg capsule XR SPINE CERV W OBL/FLEX/EXT MIN 6 V COMP  
   XR SPINE LUMB MIN 4 V  
5. Lumbar back pain with radiculopathy affecting right lower extremity M54.17 724.4 metFORMIN ER (GLUCOPHAGE XR) 500 mg tablet EMG LIMITED  
   EMG NCV MOTOR WO F/WAVE PER NERVE  
   EMG NCV SENSORY PER NERVE  
   ARLETTE COMPREHENSIVE PLUS PANEL  
   VITAMIN B12 & FOLATE  
   VITAMIN D, 25 HYROXY PANEL  
   IMMUNOELECTROPHORESIS (IMMUNOFIX.) HEMOGLOBIN A1C WITH EAG  
   GM1 ANTIBODY IGG, IGM  
   GLIADIN ABS, IGA AND IGG MYELIN ASSOC GLYCOPROTEIN AB, IGM  
   SED RATE (ESR)  
   RA + CCP ABS  
   CBC WITH AUTOMATED DIFF  
   CK ANTINEURONAL CELL AB  
   gabapentin (NEURONTIN) 100 mg capsule XR SPINE CERV W OBL/FLEX/EXT MIN 6 V COMP  
   XR SPINE LUMB MIN 4 V 6. Lumbar back pain with radiculopathy affecting left lower extremity M54.17 724.4 metFORMIN ER (GLUCOPHAGE XR) 500 mg tablet EMG LIMITED  
   EMG NCV MOTOR WO F/WAVE PER NERVE  
   EMG NCV SENSORY PER NERVE  
   ARLETTE COMPREHENSIVE PLUS PANEL  
   VITAMIN B12 & FOLATE  
   VITAMIN D, 25 HYROXY PANEL  
   IMMUNOELECTROPHORESIS (IMMUNOFIX.) HEMOGLOBIN A1C WITH EAG  
   GM1 ANTIBODY IGG, IGM  
   GLIADIN ABS, IGA AND IGG MYELIN ASSOC GLYCOPROTEIN AB, IGM  
   SED RATE (ESR)  
   RA + CCP ABS  
   CBC WITH AUTOMATED DIFF  
   CK ANTINEURONAL CELL AB  
   gabapentin (NEURONTIN) 100 mg capsule XR SPINE CERV W OBL/FLEX/EXT MIN 6 V COMP  
   XR SPINE LUMB MIN 4 V  
7. Cervical radiculopathy due to osteoarthritis of spine M47.22 721.0 metFORMIN ER (GLUCOPHAGE XR) 500 mg tablet EMG LIMITED  
   EMG NCV MOTOR WO F/WAVE PER NERVE  
   EMG NCV SENSORY PER NERVE  
   ARLETTE COMPREHENSIVE PLUS PANEL  
   VITAMIN B12 & FOLATE  
   VITAMIN D, 25 HYROXY PANEL  
   IMMUNOELECTROPHORESIS (IMMUNOFIX.) HEMOGLOBIN A1C WITH EAG  
   GM1 ANTIBODY IGG, IGM  
   GLIADIN ABS, IGA AND IGG MYELIN ASSOC GLYCOPROTEIN AB, IGM  
   SED RATE (ESR)  
   RA + CCP ABS  
   CBC WITH AUTOMATED DIFF  
   CK ANTINEURONAL CELL AB  
   gabapentin (NEURONTIN) 100 mg capsule XR SPINE CERV W OBL/FLEX/EXT MIN 6 V COMP  
   XR SPINE LUMB MIN 4 V  
8. Bilateral carpal tunnel syndrome G56.03 354.0 metFORMIN ER (GLUCOPHAGE XR) 500 mg tablet EMG LIMITED  
   EMG NCV MOTOR WO F/WAVE PER NERVE  
   EMG NCV SENSORY PER NERVE  
   ARLETTE COMPREHENSIVE PLUS PANEL  
   VITAMIN B12 & FOLATE  
   VITAMIN D, 25 HYROXY PANEL  
   IMMUNOELECTROPHORESIS (IMMUNOFIX.) HEMOGLOBIN A1C WITH EAG  
   GM1 ANTIBODY IGG, IGM  
   GLIADIN ABS, IGA AND IGG MYELIN ASSOC GLYCOPROTEIN AB, IGM  
   SED RATE (ESR)  
   RA + CCP ABS  
   CBC WITH AUTOMATED DIFF  
   CK ANTINEURONAL CELL AB  
   gabapentin (NEURONTIN) 100 mg capsule XR SPINE CERV W OBL/FLEX/EXT MIN 6 V COMP  
   XR SPINE LUMB MIN 4 V Plan:  
 
Patient had an EMG study today that did document a very mild distal axonal neuropathy that would be consistent probably with her history of diabetes, need to rule out other causes.  
Patient had complete metabolic studies done today to rule out other treatable causes of neuropathy Patient's EMG study did not document carpal tunnel syndrome except on the right side which was minimal 
No clear evidence of motor cervical radiculopathy, and patient encouraged not to cross her legs. Patient encouraged to take a multivitamin every day and vitamin D, remain mentally and physically active Patient will get cervical spine x-rays and lumbar spine x-rays to rule out other treatable causes of her symptoms. She was given Neurontin 100 mg 3 times a day take as needed for the pain. We will see her back again in 3-6 months time or earlier as needed and patient was advised of the side effects of Neurontin as far as dizziness, sedation, or any side effects to call us immediately. Her condition also discussed with her daughter in detail today. We did advise her at the single most important thing for controlling her neuropathy was to control her blood sugars. Signed By: Poly Lepe MD   
 May 15, 2017 CC: Karla Lopez MD 
FAX: [phone] This note will not be viewable in [address] If you have questions, please do not hesitate to call me. I look forward to following Ms. Ирина Bryson along with you. Sincerely, Poly Lepe MD

## 2017-05-30 ENCOUNTER — TELEPHONE (OUTPATIENT)
Dept: NEUROLOGY | Age: 82
End: 2017-05-30

## 2017-06-07 ENCOUNTER — TELEPHONE (OUTPATIENT)
Dept: NEUROLOGY | Age: 82
End: 2017-06-07

## 2017-06-07 NOTE — TELEPHONE ENCOUNTER
Will have labs that are pending faxed shortly.     Advised patient that I will send the results sent to Dr Andreina Miranda as soon as they are in the office

## 2017-06-09 LAB
25(OH)D2 SERPL-MCNC: 1.3 NG/ML
25(OH)D3 SERPL-MCNC: 13 NG/ML
25(OH)D3+25(OH)D2 SERPL-MCNC: 14 NG/ML
ANTI NEURONAL CELL AB, 811986: 23 UNITS (ref 0–54)
BASOPHILS # BLD AUTO: 0 X10E3/UL (ref 0–0.2)
BASOPHILS NFR BLD AUTO: 0 %
CCP IGA+IGG SERPL IA-ACNC: 13 UNITS (ref 0–19)
CENTROMERE B AB SER-ACNC: <0.2 AI (ref 0–0.9)
CHROMATIN AB SERPL-ACNC: <0.2 AI (ref 0–0.9)
CK SERPL-CCNC: 96 U/L (ref 24–173)
DSDNA AB SER-ACNC: <1 IU/ML (ref 0–9)
ENA JO1 AB SER-ACNC: <0.2 AI (ref 0–0.9)
ENA RNP AB SER-ACNC: 0.4 AI (ref 0–0.9)
ENA SCL70 AB SER-ACNC: 0.4 AI (ref 0–0.9)
ENA SM AB SER-ACNC: <0.2 AI (ref 0–0.9)
ENA SM+RNP AB SER-ACNC: <0.2 AI (ref 0–0.9)
ENA SS-A AB SER-ACNC: <0.2 AI (ref 0–0.9)
ENA SS-B AB SER-ACNC: <0.2 AI (ref 0–0.9)
EOSINOPHIL # BLD AUTO: 0 X10E3/UL (ref 0–0.4)
EOSINOPHIL NFR BLD AUTO: 1 %
ERYTHROCYTE [DISTWIDTH] IN BLOOD BY AUTOMATED COUNT: 16.3 % (ref 12.3–15.4)
ERYTHROCYTE [SEDIMENTATION RATE] IN BLOOD BY WESTERGREN METHOD: 28 MM/HR (ref 0–40)
EST. AVERAGE GLUCOSE BLD GHB EST-MCNC: 143 MG/DL
FOLATE SERPL-MCNC: 8.9 NG/ML
GLIADIN PEPTIDE IGA SER-ACNC: 5 UNITS (ref 0–19)
GLIADIN PEPTIDE IGG SER-ACNC: 2 UNITS (ref 0–19)
GM1 GANGL IGG TITR SER IA: NORMAL TITER
GM1 GANGL IGM TITR SER IA: NORMAL TITER
HBA1C MFR BLD: 6.6 % (ref 4.8–5.6)
HCT VFR BLD AUTO: 35.8 % (ref 34–46.6)
HGB BLD-MCNC: 11.5 G/DL (ref 11.1–15.9)
IGA SERPL-MCNC: 431 MG/DL (ref 64–422)
IGG SERPL-MCNC: 1125 MG/DL (ref 700–1600)
IGM SERPL-MCNC: 76 MG/DL (ref 26–217)
IMM GRANULOCYTES # BLD: 0 X10E3/UL (ref 0–0.1)
IMM GRANULOCYTES NFR BLD: 0 %
INTERPRETATION, 811987: NORMAL
LYMPHOCYTES # BLD AUTO: 1.8 X10E3/UL (ref 0.7–3.1)
LYMPHOCYTES NFR BLD AUTO: 34 %
MAG IGM TITR SER: NORMAL TITER
MCH RBC QN AUTO: 23 PG (ref 26.6–33)
MCHC RBC AUTO-ENTMCNC: 32.1 G/DL (ref 31.5–35.7)
MCV RBC AUTO: 72 FL (ref 79–97)
MONOCYTES # BLD AUTO: 0.3 X10E3/UL (ref 0.1–0.9)
MONOCYTES NFR BLD AUTO: 6 %
NEUTROPHILS # BLD AUTO: 3.1 X10E3/UL (ref 1.4–7)
NEUTROPHILS NFR BLD AUTO: 59 %
PLATELET # BLD AUTO: 259 X10E3/UL (ref 150–379)
PROT PATTERN SERPL IFE-IMP: ABNORMAL
RBC # BLD AUTO: 5 X10E6/UL (ref 3.77–5.28)
RHEUMATOID FACT SERPL-ACNC: <10 IU/ML (ref 0–13.9)
RIBOSOMAL P AB SER-ACNC: <0.2 AI (ref 0–0.9)
SEE BELOW:, 164879: NORMAL
VIT B12 SERPL-MCNC: 565 PG/ML (ref 211–946)
WBC # BLD AUTO: 5.3 X10E3/UL (ref 3.4–10.8)

## 2017-06-12 ENCOUNTER — TELEPHONE (OUTPATIENT)
Dept: NEUROLOGY | Age: 82
End: 2017-06-12

## 2017-06-12 NOTE — TELEPHONE ENCOUNTER
Please advise about gabapentin question.  Already gave patient's daughter your information of results

## 2017-06-12 NOTE — TELEPHONE ENCOUNTER
I spoke with the patient's daughter and went over what Dr Claritza Ivey stated below.   Sent labs to Dr Shaila Paredes as well

## 2017-06-12 NOTE — TELEPHONE ENCOUNTER
Patients daughter would like to spk with the nurse regding the test results and if pt would need to continue taking the gabapentin

## 2017-06-12 NOTE — TELEPHONE ENCOUNTER
Joe Farrell MD        3:51 PM   Note      I called the patient, told her labs okay except for hemoglobin A1c elevated, get diabetic diet from Dr. Jose Henriquez and continue metformin and regular exercise and good diet and start vitamin D 2000 units

## 2017-06-12 NOTE — TELEPHONE ENCOUNTER
I called the patient, told her labs okay except for hemoglobin A1c elevated, get diabetic diet from Dr. Alvaro Hand and continue metformin and regular exercise and good diet and start vitamin D 2000 units

## 2017-10-26 ENCOUNTER — TELEPHONE (OUTPATIENT)
Dept: NEUROLOGY | Age: 82
End: 2017-10-26

## 2017-10-26 NOTE — TELEPHONE ENCOUNTER
Received call from patient, she stated that she is having pain in her wrist and feet. She also stated that she just found out that she has diabetes. She would like a call back to discuss this.     [phone]

## 2017-10-27 NOTE — TELEPHONE ENCOUNTER
Lilian Bell, you can tell her to take capsaicin cream 0.025% which she can get over-the-counter to her hands and feet

## 2017-10-27 NOTE — TELEPHONE ENCOUNTER
She wanted to know if there is something the tingling in wrist, fingers, feet, and toes. Wanted to know if there was a cream or spray? Patient is taking gabapentin 100mg three times daily. Just makes her sleepy.     Please advise

## 2018-01-03 ENCOUNTER — OFFICE VISIT (OUTPATIENT)
Dept: NEUROLOGY | Age: 83
End: 2018-01-03

## 2018-01-03 VITALS
WEIGHT: 150 LBS | DIASTOLIC BLOOD PRESSURE: 60 MMHG | BODY MASS INDEX: 26.58 KG/M2 | SYSTOLIC BLOOD PRESSURE: 104 MMHG | OXYGEN SATURATION: 98 % | HEART RATE: 78 BPM | HEIGHT: 63 IN

## 2018-01-03 DIAGNOSIS — M19.072 PRIMARY OSTEOARTHRITIS OF LEFT FOOT: ICD-10-CM

## 2018-01-03 DIAGNOSIS — E11.42 DIABETIC PERIPHERAL NEUROPATHY ASSOCIATED WITH TYPE 2 DIABETES MELLITUS (HCC): Primary | ICD-10-CM

## 2018-01-03 RX ORDER — DICLOFENAC SODIUM 10 MG/G
2 GEL TOPICAL
Qty: 100 G | Refills: 5 | Status: SHIPPED | OUTPATIENT
Start: 2018-01-03 | End: 2019-05-22

## 2018-01-03 NOTE — PATIENT INSTRUCTIONS
Voltaren (diclofenac):  Apply to feet and hands for pain from arthritis and pain from diabetic neuropathy, as needed up to 4 times a day    Talk to your PCP about why you are not allowed to take Motrin, ask if it is okay to take this occasionally for arthritis pain      [facility]   Statement to Patients  April 1, 2014      In an effort to ensure the large volume of patient prescription refills is processed in the most efficient and expeditious manner, we are asking our patients to assist us by calling your Pharmacy for all prescription refills, this will include also your  Mail Order Pharmacy. The pharmacy will contact our office electronically to continue the refill process. Please do not wait until the last minute to call your pharmacy. We need at least 48 hours (2days) to fill prescriptions. We also encourage you to call your pharmacy before going to  your prescription to make sure it is ready. With regard to controlled substance prescription refill requests (narcotic refills) that need to be picked up at our office, we ask your cooperation by providing us with at least 72 hours (3days) notice that you will need a refill. We will not refill narcotic prescription refill requests after 4:00pm on any weekday, Monday through Thursday, or after 2:00pm on Fridays, or on the weekends. We encourage everyone to explore another way of getting your prescription refill request processed using Comp[employer], our patient web portal through our electronic medical record system. Compiere is an efficient and effective way to communicate your medication request directly to the office and  downloadable as an elvia on your smart phone . Compiere also features a review functionality that allows you to view your medication list as well as leave messages for your physician. Are you ready to get connected?  If so please review the attatched instructions or speak to any of our staff to get you set up right away! Thank you so much for your cooperation. Should you have any questions please contact our Practice Administrator. The Physicians and Staff,  [facility]          A Healthy Lifestyle: Care Instructions  Your Care Instructions    A healthy lifestyle can help you feel good, stay at a healthy weight, and have plenty of energy for both work and play. A healthy lifestyle is something you can share with your whole family. A healthy lifestyle also can lower your risk for serious health problems, such as high blood pressure, heart disease, and diabetes. You can follow a few steps listed below to improve your health and the health of your family. Follow-up care is a key part of your treatment and safety. Be sure to make and go to all appointments, and call your doctor if you are having problems. It's also a good idea to know your test results and keep a list of the medicines you take. How can you care for yourself at home? · Do not eat too much sugar, fat, or fast foods. You can still have dessert and treats now and then. The goal is moderation. · Start small to improve your eating habits. Pay attention to portion sizes, drink less juice and soda pop, and eat more fruits and vegetables. ¨ Eat a healthy amount of food. A 3-ounce serving of meat, for example, is about the size of a deck of cards. Fill the rest of your plate with vegetables and whole grains. ¨ Limit the amount of soda and sports drinks you have every day. Drink more water when you are thirsty. ¨ Eat at least 5 servings of fruits and vegetables every day. It may seem like a lot, but it is not hard to reach this goal. A serving or helping is 1 piece of fruit, 1 cup of vegetables, or 2 cups of leafy, raw vegetables. Have an apple or some carrot sticks as an afternoon snack instead of a candy bar. Try to have fruits and/or vegetables at every meal.  · Make exercise part of your daily routine.  You may want to start with simple activities, such as walking, bicycling, or slow swimming. Try to be active 30 to 60 minutes every day. You do not need to do all 30 to 60 minutes all at once. For example, you can exercise 3 times a day for 10 or 20 minutes. Moderate exercise is safe for most people, but it is always a good idea to talk to your doctor before starting an exercise program.  · Keep moving. Indu Harpin the lawn, work in the garden, or [employer]. Take the stairs instead of the elevator at work. · If you smoke, quit. People who smoke have an increased risk for heart attack, stroke, cancer, and other lung illnesses. Quitting is hard, but there are ways to boost your chance of quitting tobacco for good. ¨ Use nicotine gum, patches, or lozenges. ¨ Ask your doctor about stop-smoking programs and medicines. ¨ Keep trying. In addition to reducing your risk of diseases in the future, you will notice some benefits soon after you stop using tobacco. If you have shortness of breath or asthma symptoms, they will likely get better within a few weeks after you quit. · Limit how much alcohol you drink. Moderate amounts of alcohol (up to 2 drinks a day for men, 1 drink a day for women) are okay. But drinking too much can lead to liver problems, high blood pressure, and other health problems. Family health  If you have a family, there are many things you can do together to improve your health. · Eat meals together as a family as often as possible. · Eat healthy foods. This includes fruits, vegetables, lean meats and dairy, and whole grains. · Include your family in your fitness plan. Most people think of activities such as jogging or tennis as the way to fitness, but there are many ways you and your family can be more active. Anything that makes you breathe hard and gets your heart pumping is exercise. Here are some tips:  ¨ Walk to do errands or to take your child to school or the bus.   ¨ Go for a family bike ride after dinner instead of watching TV. Where can you learn more? Go to http://anna-carlos.info/. Enter V500 in the search box to learn more about \"A Healthy Lifestyle: Care Instructions. \"  Current as of: May 12, 2017  Content Version: 11.4  © 7494-8295 [employer], [employer]. Care instructions adapted under license by [employer] (which disclaims liability or warranty for this information). If you have questions about a medical condition or this instruction, always ask your healthcare professional. [address] any warranty or liability for your use of this information.

## 2018-01-03 NOTE — PROGRESS NOTES
Date:            January 3, 2018    Name:  Odalys Baez  :  1932  MRN:  [id number]     PCP:  Adriana Cheung MD    Chief Complaint   Patient presents with    Follow-up    Numbness     Numbness in toes and fingers. HISTORY OF PRESENT ILLNESS:  Teena Rodriguez is a 80 y.o., female who presents today for follow up for numbness in toes and fingers. She is newly diagnosed with diabetes within the past year or so. EMG/NCS did show mild length dependent neuropathy consistent with that, also mild right carpal tunnel syndrome. A1c 6.6 when last checked, vitamin D 14, all other labs normal. She reports that numbness is about the same. No tingling. She has pain in 2-3 of her toes and her 4th digit of her left hand, she feels that this pain is achy like arthritis. She denies burning pain in her hands or feet. She takes gabapentin 100 mg tid, she has not noticed any improvement in her pain. It does make her very sleepy. Motrin helps with the pain, she reports being told by her PCP not to take this medication. She reports that her BS has been well controlled when it is checked in the office, she does not check it at home. Pain makes it hard for her to fall asleep, but she sleeps well once she gets to sleep. 5.15.2017 recap  Teena Rodriguez is a 80 y.o. right-handed -American female seen today as a new patient to me for evaluation of a new progressive problem of increasing numbness in her hands and feet at the request of Dr. Nitin Frye. Patient states that she was recently found to be diabetic about 2 months ago and started on metformin. She had a hemoglobin A1c of 6.8 in August of last year and mildly elevated blood sugars on the chart. She has no family history of diabetes and no prior history of diabetes.   She says the numbness seems to be worse in the left hand involving her fourth and fifth finger, and in both feet mainly on the bottom of the feet and more in the little toe in the right foot and the big toe and the left foot. She frequently crosses her legs, and we advised her to try to avoid this. It is associated with a somewhat burning pain on the bottom of her feet at times, but she sleeps pretty well. She does not really have that much back pain but does have some neck pain that seems to radiate more to the left side and seems to have some fullness in the left supraclavicular area. She has had no recent trauma, fever, meningismus or evidence of any causes for the numbness. She has no toxin exposure, chemical exposure, or family history of similar problems. Her bowel and bladder function remained stable. She has had no cranial nerve symptoms and no major headaches. She continues to remain mentally and physically active but does not take any vitamins. She eats well and has good nutrition and tries to remain mentally and physically active. She has no major gait problems or balance problems except for the arthritis in her knees bilaterally, with the right having previous knee surgery one year ago. 5.15.2017 EMG/NCS  1). A very mild entrapment of the median nerve at the wrist on the right side consistent with a very mild carpal tunnel syndrome, as manifest only by the mildly prolonged distal sensory latency of the median nerve, with normal distal motor latency and normal EMG study of the abductor pollicis brevis on the right. 2). A mild length dependent distal axonal and probably mildly demyelinating polyneuropathy in both lower extremities consistent with possible various toxic, metabolic or acquired neuropathies, and possibly consistent with the patient's known history of diabetes. There was no clear evidence of either cervical or lumbar motor radiculopathy in the arms or legs as above on the basis of this study.      Current Outpatient Prescriptions   Medication Sig    metFORMIN ER (GLUCOPHAGE XR) 500 mg tablet TK 1 T PO ONCE QD WITH DINNER    gabapentin (NEURONTIN) 100 mg capsule Take 1 Cap by mouth three (3) times daily.  hydrochlorothiazide (HYDRODIURIL) 25 mg tablet Take 1 Tab by mouth daily. Restart when Blood Pressure is greater than 120/80    omeprazole (PRILOSEC) 20 mg capsule Take 20 mg by mouth daily.  citalopram (CELEXA) 20 mg tablet Take 20 mg by mouth daily.  atorvastatin (LIPITOR) 20 mg tablet Take 20 mg by mouth daily. No current facility-administered medications for this visit. Allergies   Allergen Reactions    Codeine Other (comments)     fainting     Past Medical History:   Diagnosis Date    Arthritis     GERD (gastroesophageal reflux disease)     Hypertension     Psychiatric disorder     anxiety     Past Surgical History:   Procedure Laterality Date    HX CHOLECYSTECTOMY      HX ÓSCAR AND BSO      RI COLONOSCOPY FLX DX W/COLLJ SPEC WHEN PFRMD  11/21/2013         UPPER GI ENDOSCOPY,BIOPSY  11/3/2015          Social History     Social History    Marital status: UNKNOWN     Spouse name: N/A    Number of children: N/A    Years of education: N/A     Occupational History    Not on file. Social History Main Topics    Smoking status: Never Smoker    Smokeless tobacco: Never Used    Alcohol use Yes      Comment: social    Drug use: No    Sexual activity: Not on file     Other Topics Concern    Not on file     Social History Narrative     Family History   Problem Relation Age of Onset    No Known Problems Mother     No Known Problems Father     Heart Disease Child     Arthritis-osteo Child     Hypertension Child          PHYSICAL EXAMINATION:    Visit Vitals    /60    Pulse 78    Ht 5' 3\" (1.6 m)    Wt 150 lb (68 kg)    SpO2 98%    BMI 26.57 kg/m2     General:  Well defined, nourished, and groomed individual in no acute distress. Neck: Supple, nontender, no bruits, no pain with resistance to active range of motion. Heart: Regular rate and rhythm, no murmurs, rub, or gallop. Normal S1S2.   Lungs:  Clear to auscultation bilaterally with equal chest expansion, no cough, no wheeze  Musculoskeletal:  Extremities revealed no edema and had full range of motion of joints. Psych:  Good mood and bright affect    NEUROLOGICAL EXAMINATION:     Mental Status:   Alert and oriented to person, place, and time with recent and remote memory intact. Attention span and concentration are normal. Speech is fluent with a full fund of knowledge. Cranial Nerves:    II, III, IV, VI:  Visual acuity grossly intact. Extra-ocular movements are full and fluid. No ptosis or nystagmus. V-XII: Hearing is grossly intact. Facial features are symmetric, with normal sensation and strength. The palate rises symmetrically and the tongue protrudes midline. Sternocleidomastoids 5/5. Motor Examination: Normal tone, bulk, and strength, 5/5 muscle strength throughout. Sensory: Diminished to temp and pinprick in bilateral stocking glove pattern  Coordination:  Finger to nose testing was normal.   No resting or intention tremor  Gait and Station:  Steady while walking. Normal arm swing. No pronator drift. No muscle wasting or fasciculations noted. ASSESSMENT AND PLAN    ICD-10-CM ICD-9-CM    1. Diabetic peripheral neuropathy associated with type 2 diabetes mellitus (HCC) E11.42 250.60      357.2    2. Primary osteoarthritis of left foot M19.072 715.17 diclofenac (VOLTAREN) 1 % gel     51-year-old female seen in follow-up for diabetic neuropathy. She complains of numbness in her feet, no burning pain but does have aching foot pain that keeps her up at night. Likely due to arthritis, but Voltaren may be helpful with that as well as with any neuropathic pain she is experiencing. Reports that her blood sugars well controlled. 1.  We will try Voltaren 2 g up to 4 times a day as needed applied to hands and feet for pain from neuropathy and arthritis  2.   Can discontinue gabapentin 100 mg 3 times daily, she has not found it helpful and it makes her tired. Increasing would likely make her more tired  3. Discussed the importance of tight control of blood sugar to prevent worsening of neuropathy      Follow-up in 6 months, call sooner with concerns    Gabriel Silva NP    This note was created using voice recognition software. Despite editing, there may be syntax errors.

## 2018-01-03 NOTE — MR AVS SNAPSHOT
Visit Information Date & Time Provider Department Dept. Phone Encounter #  
 1/3/2018 11:30 AM Katja Mcgarry NP [facility] [phone] [id number] Upcoming Health Maintenance Date Due  
 LIPID PANEL Q1 9/22/1932 FOOT EXAM Q1 9/22/1942 MICROALBUMIN Q1 9/22/1942 EYE EXAM RETINAL OR DILATED Q1 9/22/1942 DTaP/Tdap/Td series (1 - Tdap) 9/22/1953 ZOSTER VACCINE AGE 60> 7/22/1992 GLAUCOMA SCREENING Q2Y 9/22/1997 OSTEOPOROSIS SCREENING (DEXA) 9/22/1997 Pneumococcal 65+ Low/Medium Risk (1 of 2 - PCV13) 9/22/1997 MEDICARE YEARLY EXAM 9/22/1997 Influenza Age 5 to Adult 8/1/2017 HEMOGLOBIN A1C Q6M 11/15/2017 Allergies as of 1/3/2018  Review Complete On: 1/3/2018 By: Katja Mcgarry NP Severity Noted Reaction Type Reactions Codeine High 11/20/2013    Other (comments)  
 fainting Current Immunizations  Never Reviewed No immunizations on file. Not reviewed this visit You Were Diagnosed With   
  
 Codes Comments Diabetic peripheral neuropathy associated with type 2 diabetes mellitus ([facility]    -  Primary ICD-10-CM: E11.42 
ICD-9-CM: 250.60, 357.2 Primary osteoarthritis of left foot     ICD-10-CM: K25.797 ICD-9-CM: 715.17 Vitals BP Pulse Height(growth percentile) Weight(growth percentile) SpO2 BMI  
 104/60 78 5' 3\" (1.6 m) 150 lb (68 kg) 98% 26.57 kg/m2 OB Status Smoking Status Hysterectomy Never Smoker Vitals History BMI and BSA Data Body Mass Index Body Surface Area  
 26.57 kg/m 2 1.74 m 2 Preferred Pharmacy Pharmacy Name Phone [address] [facility] [address] [facility] [phone] Your Updated Medication List  
  
   
This list is accurate as of: 1/3/18 11:47 AM.  Always use your most recent med list.  
  
  
  
  
 atorvastatin 20 mg tablet Commonly known as:  LIPITOR Take 20 mg by mouth daily. citalopram 20 mg tablet Commonly known as:  Shania Lush Take 20 mg by mouth daily. diclofenac 1 % Gel Commonly known as:  VOLTAREN Apply 2 g to affected area four (4) times daily as needed. Apply to feet.  
  
 gabapentin 100 mg capsule Commonly known as:  NEURONTIN Take 1 Cap by mouth three (3) times daily. hydroCHLOROthiazide 25 mg tablet Commonly known as:  HYDRODIURIL Take 1 Tab by mouth daily. Restart when Blood Pressure is greater than 120/80  
  
 metFORMIN  mg tablet Commonly known as:  GLUCOPHAGE XR  
TK 1 T PO ONCE QD WITH DINNER  
  
 omeprazole 20 mg capsule Commonly known as:  PRILOSEC Take 20 mg by mouth daily. Prescriptions Sent to Pharmacy Refills  
 diclofenac (VOLTAREN) 1 % gel 5 Sig: Apply 2 g to affected area four (4) times daily as needed. Apply to feet. Class: Normal  
 Pharmacy: [facility] [place] [id number], [address] AT [address] [facility] Ph #: [phone] Route: Topical  
  
Patient Instructions Voltaren (diclofenac):  Apply to feet and hands for pain from arthritis and pain from diabetic neuropathy, as needed up to 4 times a day Talk to your PCP about why you are not allowed to take Motrin, ask if it is okay to take this occasionally for arthritis pain PRESCRIPTION REFILL POLICY [facility] Statement to Patients April 1, 2014 In an effort to ensure the large volume of patient prescription refills is processed in the most efficient and expeditious manner, we are asking our patients to assist us by calling your Pharmacy for all prescription refills, this will include also your  Mail Order Pharmacy. The pharmacy will contact our office electronically to continue the refill process. Please do not wait until the last minute to call your pharmacy.  We need at least 48 hours (2days) to fill prescriptions. We also encourage you to call your pharmacy before going to  your prescription to make sure it is ready. With regard to controlled substance prescription refill requests (narcotic refills) that need to be picked up at our office, we ask your cooperation by providing us with at least 72 hours (3days) notice that you will need a refill. We will not refill narcotic prescription refill requests after 4:00pm on any weekday, Monday through Thursday, or after 2:00pm on Fridays, or on the weekends. We encourage everyone to explore another way of getting your prescription refill request processed using [employer], our patient web portal through our electronic medical record system. [employer]works is an efficient and effective way to communicate your medication request directly to the office and  downloadable as an elvia on your smart phone . [employer] also features a review functionality that allows you to view your medication list as well as leave messages for your physician. Are you ready to get connected? If so please review the attatched instructions or speak to any of our staff to get you set up right away! Thank you so much for your cooperation. Should you have any questions please contact our Practice Administrator. The Physicians and Staff,  [facility] A Healthy Lifestyle: Care Instructions Your Care Instructions A healthy lifestyle can help you feel good, stay at a healthy weight, and have plenty of energy for both work and play. A healthy lifestyle is something you can share with your whole family. A healthy lifestyle also can lower your risk for serious health problems, such as high blood pressure, heart disease, and diabetes. You can follow a few steps listed below to improve your health and the health of your family. Follow-up care is a key part of your treatment and safety.  Be sure to make and go to all appointments, and call your doctor if you are having problems. It's also a good idea to know your test results and keep a list of the medicines you take. How can you care for yourself at home? · Do not eat too much sugar, fat, or fast foods. You can still have dessert and treats now and then. The goal is moderation. · Start small to improve your eating habits. Pay attention to portion sizes, drink less juice and soda pop, and eat more fruits and vegetables. ¨ Eat a healthy amount of food. A 3-ounce serving of meat, for example, is about the size of a deck of cards. Fill the rest of your plate with vegetables and whole grains. ¨ Limit the amount of soda and sports drinks you have every day. Drink more water when you are thirsty. ¨ Eat at least 5 servings of fruits and vegetables every day. It may seem like a lot, but it is not hard to reach this goal. A serving or helping is 1 piece of fruit, 1 cup of vegetables, or 2 cups of leafy, raw vegetables. Have an apple or some carrot sticks as an afternoon snack instead of a candy bar. Try to have fruits and/or vegetables at every meal. 
· Make exercise part of your daily routine. You may want to start with simple activities, such as walking, bicycling, or slow swimming. Try to be active 30 to 60 minutes every day. You do not need to do all 30 to 60 minutes all at once. For example, you can exercise 3 times a day for 10 or 20 minutes. Moderate exercise is safe for most people, but it is always a good idea to talk to your doctor before starting an exercise program. 
· Keep moving. Kianna Upminh the lawn, work in the garden, or [employer]. Take the stairs instead of the elevator at work. · If you smoke, quit. People who smoke have an increased risk for heart attack, stroke, cancer, and other lung illnesses. Quitting is hard, but there are ways to boost your chance of quitting tobacco for good. ¨ Use nicotine gum, patches, or lozenges. ¨ Ask your doctor about stop-smoking programs and medicines. ¨ Keep trying. In addition to reducing your risk of diseases in the future, you will notice some benefits soon after you stop using tobacco. If you have shortness of breath or asthma symptoms, they will likely get better within a few weeks after you quit. · Limit how much alcohol you drink. Moderate amounts of alcohol (up to 2 drinks a day for men, 1 drink a day for women) are okay. But drinking too much can lead to liver problems, high blood pressure, and other health problems. Family health If you have a family, there are many things you can do together to improve your health. · Eat meals together as a family as often as possible. · Eat healthy foods. This includes fruits, vegetables, lean meats and dairy, and whole grains. · Include your family in your fitness plan. Most people think of activities such as jogging or tennis as the way to fitness, but there are many ways you and your family can be more active. Anything that makes you breathe hard and gets your heart pumping is exercise. Here are some tips: 
¨ Walk to do errands or to take your child to school or the bus. ¨ Go for a family bike ride after dinner instead of watching TV. Where can you learn more? Go to http://anna-carlos.info/. Enter Q472 in the search box to learn more about \"A Healthy Lifestyle: Care Instructions. \" Current as of: May 12, 2017 Content Version: 11.4 © 0248-4258 [employer]. Care instructions adapted under license by [employer] (which disclaims liability or warranty for this information). If you have questions about a medical condition or this instruction, always ask your healthcare professional. [address] any warranty or liability for your use of this information. Introducing [facility]!    
 Nettie Alvarez introduces onkea patient portal. Now you can access parts of your medical record, email your doctor's office, and request medication refills online. 1. In your internet browser, go to [email or web address] 2. Click on the First Time User? Click Here link in the Sign In box. You will see the New Member Sign Up page. 3. Enter your Praedicat Access Code exactly as it appears below. You will not need to use this code after youve completed the sign-up process. If you do not sign up before the expiration date, you must request a new code. · Praedicat Access Code: [email or web address] Expires: 2/11/2018  3:35 PM 
 
4. Enter the last four digits of your Social Security Number (xxxx) and Date of Birth (mm/dd/yyyy) as indicated and click Submit. You will be taken to the next sign-up page. 5. Create a Praedicat ID. This will be your Praedicat login ID and cannot be changed, so think of one that is secure and easy to remember. 6. Create a Praedicat password. You can change your password at any time. 7. Enter your Password Reset Question and Answer. This can be used at a later time if you forget your password. 8. Enter your e-mail address. You will receive e-mail notification when new information is available in [address] 9. Click Sign Up. You can now view and download portions of your medical record. 10. Click the Download Summary menu link to download a portable copy of your medical information. If you have questions, please visit the Frequently Asked Questions section of the Praedicat website. Remember, Praedicat is NOT to be used for urgent needs. For medical emergencies, dial 911. Now available from your iPhone and Android! Please provide this summary of care documentation to your next provider. Your primary care clinician is listed as Nalini Patino. If you have any questions after today's visit, please call [phone].

## 2018-01-04 ENCOUNTER — TELEPHONE (OUTPATIENT)
Dept: NEUROLOGY | Age: 83
End: 2018-01-04

## 2018-01-04 NOTE — TELEPHONE ENCOUNTER
The rx for voltaren gel that was given yesterday to this pt was $300.00 and insurance did not cover it . She was wondering if Shelli Devi would recommend something else or call the insurance company to get it approved.

## 2018-01-05 ENCOUNTER — TELEPHONE (OUTPATIENT)
Dept: NEUROLOGY | Age: 83
End: 2018-01-05

## 2018-01-05 NOTE — TELEPHONE ENCOUNTER
----- Message from Particia Has sent at 1/5/2018  9:54 AM EST -----  Regarding: Dr. Ahsan Joshua  The medication that was prescribed to treat the tingling sentation and numbness in the hands is being denied. Pt would like for Dr. Sraa Olivas to contact the insurance  Company and [employer] located on [address] [place] [address] and [address] [place] [address] [address] (p) [phone] to explain the medical necessity for the medication. Pt can be reached at [phone].

## 2018-01-10 ENCOUNTER — TELEPHONE (OUTPATIENT)
Dept: NEUROLOGY | Age: 83
End: 2018-01-10

## 2018-02-02 ENCOUNTER — HOSPITAL ENCOUNTER (OUTPATIENT)
Dept: MAMMOGRAPHY | Age: 83
Discharge: HOME | End: 2018-02-02
Attending: FAMILY MEDICINE
Payer: MEDICARE

## 2018-02-02 DIAGNOSIS — Z12.39 SCREENING BREAST EXAMINATION: ICD-10-CM

## 2018-02-02 PROCEDURE — 77067 SCR MAMMO BI INCL CAD: CPT

## 2018-06-27 DIAGNOSIS — G56.03 BILATERAL CARPAL TUNNEL SYNDROME: ICD-10-CM

## 2018-06-27 DIAGNOSIS — E53.8 B12 DEFICIENCY: ICD-10-CM

## 2018-06-27 DIAGNOSIS — E11.42 DIABETIC PERIPHERAL NEUROPATHY ASSOCIATED WITH TYPE 2 DIABETES MELLITUS (HCC): ICD-10-CM

## 2018-06-27 DIAGNOSIS — M47.22 CERVICAL RADICULOPATHY DUE TO OSTEOARTHRITIS OF SPINE: ICD-10-CM

## 2018-06-27 DIAGNOSIS — M54.16 LUMBAR BACK PAIN WITH RADICULOPATHY AFFECTING RIGHT LOWER EXTREMITY: ICD-10-CM

## 2018-06-27 DIAGNOSIS — M54.16 LUMBAR BACK PAIN WITH RADICULOPATHY AFFECTING LEFT LOWER EXTREMITY: ICD-10-CM

## 2018-06-27 DIAGNOSIS — E55.9 VITAMIN D DEFICIENCY: ICD-10-CM

## 2018-06-27 DIAGNOSIS — G60.8 IDIOPATHIC SMALL AND LARGE FIBER SENSORY NEUROPATHY: ICD-10-CM

## 2018-06-28 RX ORDER — GABAPENTIN 100 MG/1
CAPSULE ORAL
Qty: 90 CAP | Refills: 0 | Status: SHIPPED | OUTPATIENT
Start: 2018-06-28 | End: 2018-08-09 | Stop reason: SDUPTHER

## 2018-07-03 ENCOUNTER — TELEPHONE (OUTPATIENT)
Dept: NEUROLOGY | Age: 83
End: 2018-07-03

## 2018-07-03 NOTE — TELEPHONE ENCOUNTER
Message per Hello please call yesenia need to cancel my apptmt 7/3, would not be in town.  Please call to rs

## 2018-07-11 NOTE — TELEPHONE ENCOUNTER
Left message that we do not have any open appointments at this time, but will put on our cancellation list.

## 2018-08-07 ENCOUNTER — HOSPITAL ENCOUNTER (OUTPATIENT)
Dept: PHYSICAL THERAPY | Age: 83
Discharge: HOME | End: 2018-08-07

## 2018-08-07 ENCOUNTER — HOSPITAL ENCOUNTER (OUTPATIENT)
Dept: PREADMISSION TESTING | Age: 83
Discharge: HOME | End: 2018-08-07
Payer: MEDICARE

## 2018-08-07 VITALS
RESPIRATION RATE: 20 BRPM | WEIGHT: 145 LBS | OXYGEN SATURATION: 98 % | HEART RATE: 75 BPM | SYSTOLIC BLOOD PRESSURE: 105 MMHG | BODY MASS INDEX: 26.68 KG/M2 | TEMPERATURE: 97.5 F | DIASTOLIC BLOOD PRESSURE: 80 MMHG | HEIGHT: 62 IN

## 2018-08-07 LAB
ABO + RH BLD: NORMAL
ALBUMIN SERPL-MCNC: 3.7 G/DL (ref 3.5–5)
ALBUMIN/GLOB SERPL: 0.9 {RATIO} (ref 1.1–2.2)
ALP SERPL-CCNC: 82 U/L (ref 45–117)
ALT SERPL-CCNC: 52 U/L (ref 12–78)
ANION GAP SERPL CALC-SCNC: 9 MMOL/L (ref 5–15)
APPEARANCE UR: CLEAR
AST SERPL-CCNC: 52 U/L (ref 15–37)
ATRIAL RATE: 55 BPM
BACTERIA URNS QL MICRO: NEGATIVE /HPF
BILIRUB SERPL-MCNC: 0.4 MG/DL (ref 0.2–1)
BILIRUB UR QL: NEGATIVE
BLOOD GROUP ANTIBODIES SERPL: NORMAL
BUN SERPL-MCNC: 18 MG/DL (ref 6–20)
BUN/CREAT SERPL: 27 (ref 12–20)
CALCIUM SERPL-MCNC: 8.9 MG/DL (ref 8.5–10.1)
CALCULATED P AXIS, ECG09: 29 DEGREES
CALCULATED R AXIS, ECG10: -13 DEGREES
CALCULATED T AXIS, ECG11: 32 DEGREES
CHLORIDE SERPL-SCNC: 102 MMOL/L (ref 97–108)
CO2 SERPL-SCNC: 28 MMOL/L (ref 21–32)
COLOR UR: NORMAL
CREAT SERPL-MCNC: 0.67 MG/DL (ref 0.55–1.02)
DIAGNOSIS, 93000: NORMAL
EPITH CASTS URNS QL MICRO: NORMAL /LPF
ERYTHROCYTE [DISTWIDTH] IN BLOOD BY AUTOMATED COUNT: 15.9 % (ref 11.5–14.5)
EST. AVERAGE GLUCOSE BLD GHB EST-MCNC: 131 MG/DL
GLOBULIN SER CALC-MCNC: 4.3 G/DL (ref 2–4)
GLUCOSE SERPL-MCNC: 107 MG/DL (ref 65–100)
GLUCOSE UR STRIP.AUTO-MCNC: NEGATIVE MG/DL
HBA1C MFR BLD: 6.2 % (ref 4.2–6.3)
HCT VFR BLD AUTO: 39.5 % (ref 35–47)
HGB BLD-MCNC: 12.1 G/DL (ref 11.5–16)
HGB UR QL STRIP: NEGATIVE
HYALINE CASTS URNS QL MICRO: NORMAL /LPF (ref 0–5)
INR PPP: 1.1 (ref 0.9–1.1)
KETONES UR QL STRIP.AUTO: NEGATIVE MG/DL
LEUKOCYTE ESTERASE UR QL STRIP.AUTO: NEGATIVE
MCH RBC QN AUTO: 22.9 PG (ref 26–34)
MCHC RBC AUTO-ENTMCNC: 30.6 G/DL (ref 30–36.5)
MCV RBC AUTO: 74.8 FL (ref 80–99)
NITRITE UR QL STRIP.AUTO: NEGATIVE
NRBC # BLD: 0 K/UL (ref 0–0.01)
NRBC BLD-RTO: 0 PER 100 WBC
P-R INTERVAL, ECG05: 158 MS
PH UR STRIP: 7.5 [PH] (ref 5–8)
PLATELET # BLD AUTO: 261 K/UL (ref 150–400)
PMV BLD AUTO: 10.7 FL (ref 8.9–12.9)
POTASSIUM SERPL-SCNC: 3.5 MMOL/L (ref 3.5–5.1)
PROT SERPL-MCNC: 8 G/DL (ref 6.4–8.2)
PROT UR STRIP-MCNC: NEGATIVE MG/DL
PROTHROMBIN TIME: 10.7 SEC (ref 9–11.1)
Q-T INTERVAL, ECG07: 462 MS
QRS DURATION, ECG06: 72 MS
QTC CALCULATION (BEZET), ECG08: 441 MS
RBC # BLD AUTO: 5.28 M/UL (ref 3.8–5.2)
RBC #/AREA URNS HPF: NORMAL /HPF (ref 0–5)
SODIUM SERPL-SCNC: 139 MMOL/L (ref 136–145)
SP GR UR REFRACTOMETRY: 1.02 (ref 1–1.03)
SPECIMEN EXP DATE BLD: NORMAL
UA: UC IF INDICATED,UAUC: NORMAL
UROBILINOGEN UR QL STRIP.AUTO: 1 EU/DL (ref 0.2–1)
VENTRICULAR RATE, ECG03: 55 BPM
WBC # BLD AUTO: 5.3 K/UL (ref 3.6–11)
WBC URNS QL MICRO: NORMAL /HPF (ref 0–4)

## 2018-08-07 PROCEDURE — 80053 COMPREHEN METABOLIC PANEL: CPT | Performed by: ORTHOPAEDIC SURGERY

## 2018-08-07 PROCEDURE — 97116 GAIT TRAINING THERAPY: CPT

## 2018-08-07 PROCEDURE — 86900 BLOOD TYPING SEROLOGIC ABO: CPT | Performed by: ORTHOPAEDIC SURGERY

## 2018-08-07 PROCEDURE — 83036 HEMOGLOBIN GLYCOSYLATED A1C: CPT | Performed by: ORTHOPAEDIC SURGERY

## 2018-08-07 PROCEDURE — 93005 ELECTROCARDIOGRAM TRACING: CPT

## 2018-08-07 PROCEDURE — G8980 MOBILITY D/C STATUS: HCPCS

## 2018-08-07 PROCEDURE — G8979 MOBILITY GOAL STATUS: HCPCS

## 2018-08-07 PROCEDURE — 85027 COMPLETE CBC AUTOMATED: CPT | Performed by: ORTHOPAEDIC SURGERY

## 2018-08-07 PROCEDURE — 36415 COLL VENOUS BLD VENIPUNCTURE: CPT | Performed by: ORTHOPAEDIC SURGERY

## 2018-08-07 PROCEDURE — 81001 URINALYSIS AUTO W/SCOPE: CPT | Performed by: ORTHOPAEDIC SURGERY

## 2018-08-07 PROCEDURE — G8978 MOBILITY CURRENT STATUS: HCPCS

## 2018-08-07 PROCEDURE — 85610 PROTHROMBIN TIME: CPT | Performed by: ORTHOPAEDIC SURGERY

## 2018-08-07 PROCEDURE — 97161 PT EVAL LOW COMPLEX 20 MIN: CPT

## 2018-08-07 RX ORDER — CELECOXIB 200 MG/1
200 CAPSULE ORAL ONCE
Status: CANCELLED | OUTPATIENT
Start: 2018-08-21 | End: 2018-08-21

## 2018-08-07 RX ORDER — CETIRIZINE HCL 10 MG
10 TABLET ORAL
COMMUNITY

## 2018-08-07 RX ORDER — CEFAZOLIN SODIUM/WATER 2 G/20 ML
2 SYRINGE (ML) INTRAVENOUS ONCE
Status: CANCELLED | OUTPATIENT
Start: 2018-08-21 | End: 2018-08-21

## 2018-08-07 RX ORDER — SODIUM CHLORIDE, SODIUM LACTATE, POTASSIUM CHLORIDE, CALCIUM CHLORIDE 600; 310; 30; 20 MG/100ML; MG/100ML; MG/100ML; MG/100ML
25 INJECTION, SOLUTION INTRAVENOUS CONTINUOUS
Status: CANCELLED | OUTPATIENT
Start: 2018-08-21

## 2018-08-07 RX ORDER — ACETAMINOPHEN 325 MG/1
650 TABLET ORAL ONCE
Status: CANCELLED | OUTPATIENT
Start: 2018-08-21 | End: 2018-08-21

## 2018-08-07 RX ORDER — PREGABALIN 75 MG/1
75 CAPSULE ORAL ONCE
Status: CANCELLED | OUTPATIENT
Start: 2018-08-21 | End: 2018-08-21

## 2018-08-07 NOTE — PERIOP NOTES
Incentive Spirometer        Using the incentive spirometer helps expand the small air sacs of your lungs, helps you breathe deeply, and helps improve your lung function. Use your incentive spirometer twice a day (10 breaths each time) prior to surgery. How to Use Your Incentive Spirometer:  1. Hold the incentive spirometer in an upright position. 2. Breathe out as usual.   3. Place the mouthpiece in your mouth and seal your lips tightly around it. 4. Take a deep breath. Breathe in slowly and as deeply as possible. Keep the blue flow rate guide between the arrows. 5. Hold your breath as long as possible. Then exhale slowly and allow the piston to fall to the bottom of the column. 6. Rest for a few seconds and repeat steps one through five at least 10 times. 08/07/18__________________    Austyn Lopez THE INCENTIVE SPIROMETER WITH YOU TO THE HOSPITAL ON THE DAY OF YOUR SURGERY. Opportunity given to ask and answer questions as well as to observe return demonstration.     Patient signature_____________________________    Witness____________________________

## 2018-08-07 NOTE — PROGRESS NOTES
[facility]  Physical Therapy Pre-surgery evaluation  [address] [facility], [address] [facility]    physical Therapy pre TKR surgery EVALUATION  Patient: Nancy Fernandez (89 y.o. female)  Date: 8/7/2018  Primary Diagnosis: left knee DJD        Precautions:        ASSESSMENT :  Based on the objective data described below, the patient presents with impaired gait, balance, pain, and overall high level functional mobility due to end stage degenerative joint disease in the left knee. Pt had the R knee replaced in 2016. Discussed anticipated disposition to home with possible discharge within a 1 to 2 day time frame post-surgery. Patient and  in agreement. Daughter present and will be , she was  for last surgery. She was here 2 days after last knee replacement. She received HHPT for 2 weeks and then a couple visits of OPPT. GOALS: (Goals have been discussed and agreed upon with patient.)  DISCHARGE GOALS: Time Frame: 1 DAY  1. Patient will demonstrate increased strength, range of motion, and pain control via a home exercise program in order to minimize functional deficits in preparation for their upcoming surgery. This will be achieved by using education, demonstration and through the use of an informational handout including a home exercise program.  REHABILITATION POTENTIAL FOR STATED GOALS: Good     RECOMMENDATIONS AND PLANNED INTERVENTIONS: (Benefits and precautions of physical therapy have been discussed with the patient.)  1. Home Exercise Program  TREATMENT PLAN EFFECTIVE DATES: 8/7/2018 TO 8/7/2018  FREQUENCY/DURATION: Patient to continue to perform home exercise program at least twice daily until her surgery. SUBJECTIVE:   Patient stated I was told the nurses are going to get me up if it is after 4 because therapy isn't here.     OBJECTIVE DATA SUMMARY:   HISTORY:    Past Medical History:   Diagnosis Date    Arthritis     GERD (gastroesophageal reflux disease)     Hypertension     Psychiatric disorder     anxiety     Past Surgical History:   Procedure Laterality Date    HX CHOLECYSTECTOMY      HX ÓSCAR AND BSO      [place] COLONOSCOPY FLX DX W/COLLJ SPEC WHEN PFRMD  11/21/2013         UPPER GI ENDOSCOPY,BIOPSY  11/3/2015          Prior Level of Function/Home Situation: pt lives with daughter and currently using [facility] for all mobility. She is independent with ADLs and denied any falls. She does not drive and children help as needed. Personal factors and/or comorbidities impacting plan of care:     Patient []   does  [x]   does not state signs/symptoms of shortness of breath/dyspnea on exertion/respiratory distress. Home Situation  Home Environment: Private residence  # Steps to Enter: 4  Rails to Enter: Yes  Hand Rails : Right  One/Two Story Residence: Two story, live on 1st floor  Living Alone: No  Support Systems: Child(deisy)  Patient Expects to be Discharged to[de-identified] Private residence  Current DME Used/Available at Home: Rexene Rendon, straight, Walker, rolling, Shower chair, Grab bars  Tub or Shower Type: Tub/Shower combination    EXAMINATION/PRESENTATION/DECISION MAKING:     ADLs (Current Functional Status): Bathing/Showering:   [x] Independent  [] Requires Assistance from Someone  [] Sponge Bath Only   Ambulation:  [] Independent  [] Walk Indoors Only  [] Walk Outdoors  [x] Use Assistive Device [facility])  [] Use Wheelchair Only     Dressing:  [x] Independent    Requires Assistance from Someone for:  [] Sock/Shoes  [] Pants  [] Everything   Household Activities:  [] Routine house and yard work  [x] Light Housework Only  [] None       Critical Behavior:       alert and oriented x 4          Strength:    Strength:  Within functional limits                    Tone & Sensation:   Tone: Normal              Sensation: Impaired (diabetic neuropathy)               Range Of Motion:  AROM: Generally decreased, functional           PROM: Generally decreased, functional Coordination:  Coordination: Within functional limits    Functional Mobility:  Transfers:  Sit to Stand: Modified independent  Stand to Sit: Modified independent                       Balance:   Sitting: Intact  Standing: Intact, With support  Ambulation/Gait Training:  Distance (ft): 50 Feet (ft)  Assistive Device: Cane, straight  Ambulation - Level of Assistance: Modified independent        Gait Abnormalities: Antalgic, Trunk sway increased                      Therapeutic Exercises:   The patient was educated in, has demonstrated, and has received written instructions to complete for their home exercise program per total knee replacement protocol. Functional Measure:  Lower Extremity Functional Scale (LEFS):      Score 12/80     Percentage of impairment CH  0% CI  1-19% CJ  20-39% CK  40-59% CL  60-79% CM  80-99% CN  100%   LEFS score:  0-80 80 64-79 47-63 31-46 16-30 1-15 0     Cutt-offs: None established  TKA and DEBORAH:   (Jerri et al, 2000)  MDC = 9 points   MCID = 9 points           G codes: In compliance with CMSs Claims Based Outcome Reporting, the following G-code set was chosen for this patient based on their primary functional limitation being treated: The outcome measure chosen to determine the severity of the functional limitation was the LEFS with a score of 12/80 which was correlated with the impairment scale. ? Mobility - Walking and Moving Around:     - CURRENT STATUS: CM - 80%-99% impaired, limited or restricted    - GOAL STATUS: CM - 80%-99% impaired, limited or restricted    - D/C STATUS:  CM - 80%-99% impaired, limited or restricted     Pain:  Pain Scale 1: Numeric (0 - 10)                   Activity Tolerance:   WFL   Patient []   does  [x]   does not demonstrate signs/symptoms of shortness of breath/dyspnea on exertion/respiratory distress.   COMMUNICATION/EDUCATION:   The patient was educated on:  [x]         Importance of post-operative mobility to achieve their desired outcomes and restore biological function  [x]         The key post-operative time frame to address ROM to prevent additional complications    The patients plan of care was discussed with:   [x]         The patient verbalized understanding of her plan in preparation for their upcoming surgery  [x]         The patient's  was present for this session  []         The patient reports that he/she does not have a  identified at this time  [x]         The  verbalized understanding of the education regarding the patient's upcoming surgery  []         Patient/family agree to work toward stated goals and plan of care. []         Patient understands intent and goals of therapy, but is neutral about his/her participation. []         Patient is unable to participate in goal setting and plan of care.     Thank you for this referral.  Sang Franco, PT, DPT   Time Calculation: 20 mins

## 2018-08-07 NOTE — PERIOP NOTES
[facility]  Preoperative Instructions        Surgery Date 8/21/18*          Time of [address]  Contact # [phone] home    1. On the day of your surgery, please report to the Surgical Services Registration Desk and sign in at your designated time. The Surgery Center is located to the right of the Emergency Room. 2. You must have someone with you to drive you home. You should not drive a car for 24 hours following surgery. Please make arrangements for a friend or family member to stay with you for the first 24 hours after your surgery. 3. Do not have anything to eat or drink (including water, gum, mints, coffee, juice) after midnight ?08/20/18    . ? This may not apply to medications prescribed by your physician. ?(Please note below the special instructions with medications to take the morning of your procedure.)    4. We recommend you do not drink any alcoholic beverages for 24 hours before and after your surgery. 5. Contact your surgeons office for instructions on the following medications: non-steroidal anti-inflammatory drugs (i.e. Advil, Aleve), vitamins, and supplements. (Some surgeons will want you to stop these medications prior to surgery and others may allow you to take them)  **If you are currently taking Plavix, Coumadin, Aspirin and/or other blood-thinning agents, contact your surgeon for instructions. ** Your surgeon will partner with the physician prescribing these medications to determine if it is safe to stop or if you need to continue taking. Please do not stop taking these medications without instructions from your surgeon    6. Wear comfortable clothes. Wear glasses instead of contacts. Do not bring any money or jewelry. Please bring picture ID, insurance card, and any prearranged co-payment or hospital payment. Do not wear make-up, particularly mascara the morning of your surgery.   Do not wear nail polish, particularly if you are having foot /hand surgery. Wear your hair loose or down, no ponytails, buns, francheska pins or clips. All body piercings must be removed. Please shower with antibacterial soap for three consecutive days before and on the morning of surgery, but do not apply any lotions, powders or deodorants after the shower on the day of surgery. Please use a fresh towels after each shower. Please sleep in clean clothes and change bed linens the night before surgery. Please do not shave for 48 hours prior to surgery. Shaving of the face is acceptable. 7. You should understand that if you do not follow these instructions your surgery may be cancelled. If your physical condition changes (I.e. fever, cold or flu) please contact your surgeon as soon as possible. 8. It is important that you be on time. If a situation occurs where you may be late, please call [phone] (OR Holding Area). 9. If you have any questions and or problems, please call [phone] (Pre-admission Testing). 10. Your surgery time may be subject to change. You will receive a phone call the evening prior if your time changes. 11.  If having outpatient surgery, you must have someone to drive you here, stay with you during the duration of your stay, and to drive you home at time of discharge. 12.   In an effort to improve the efficiency, privacy, and safety for all of our Pre-op patients visitors are not allowed in the Holding area. Once you arrive and are registered your family/visitors will be asked to remain in the waiting room. The Pre-op staff will get you from the Surgical Waiting Area and will explain to you and your family/visitors that the Pre-op phase is beginning. The staff will answer any questions and provide instructions for tracking of the patient, by use of the existing tracking number and color-coded status board in the waiting room.   At this time the staff will also ask for your designated spokesperson information in the event that the physician or staff need to provide an update or obtain any pertinent information. The designated spokesperson will be notified if the physician needs to speak to family during the pre-operative phase. If at any time your family/visitors has questions or concerns they may approach the volunteer desk in the waiting area for assistance. Special Instructions:Practice with the incentive spirometry/please bring incentive spirometry back to the hospital for use    MEDICATIONS TO TAKE THE MORNING OF SURGERY WITH A SIP OF WATER:celexa,gabapentin,prilosec      I understand a pre-operative phone call will be made to verify my surgery time. In the event that I am not available, I give permission for a message to be left on my answering service and/or with another person?   yes         ___________________      __________   _________    (Signature of Patient)             (Witness)                (Date and Time)

## 2018-08-08 LAB
BACTERIA SPEC CULT: NORMAL
BACTERIA SPEC CULT: NORMAL
SERVICE CMNT-IMP: NORMAL

## 2018-08-09 DIAGNOSIS — E11.42 DIABETIC PERIPHERAL NEUROPATHY ASSOCIATED WITH TYPE 2 DIABETES MELLITUS (HCC): ICD-10-CM

## 2018-08-09 DIAGNOSIS — G60.8 IDIOPATHIC SMALL AND LARGE FIBER SENSORY NEUROPATHY: ICD-10-CM

## 2018-08-09 DIAGNOSIS — M54.16 LUMBAR BACK PAIN WITH RADICULOPATHY AFFECTING LEFT LOWER EXTREMITY: ICD-10-CM

## 2018-08-09 DIAGNOSIS — M47.22 CERVICAL RADICULOPATHY DUE TO OSTEOARTHRITIS OF SPINE: ICD-10-CM

## 2018-08-09 DIAGNOSIS — E55.9 VITAMIN D DEFICIENCY: ICD-10-CM

## 2018-08-09 DIAGNOSIS — E53.8 B12 DEFICIENCY: ICD-10-CM

## 2018-08-09 DIAGNOSIS — G56.03 BILATERAL CARPAL TUNNEL SYNDROME: ICD-10-CM

## 2018-08-09 DIAGNOSIS — M54.16 LUMBAR BACK PAIN WITH RADICULOPATHY AFFECTING RIGHT LOWER EXTREMITY: ICD-10-CM

## 2018-08-09 RX ORDER — GABAPENTIN 100 MG/1
CAPSULE ORAL
Qty: 90 CAP | Refills: 2 | Status: SHIPPED | OUTPATIENT
Start: 2018-08-09 | End: 2018-11-29 | Stop reason: SDUPTHER

## 2018-08-09 NOTE — PERIOP NOTES
Faxed pre-op labs/EKG  to Dr. Elmo Ibarra office per request. Confirmation. Office to send medical evaluation note.

## 2018-08-09 NOTE — TELEPHONE ENCOUNTER
Requested Prescriptions     Pending Prescriptions Disp Refills    gabapentin (NEURONTIN) 100 mg capsule 90 Cap 0     Pt is out of Gabapentin. Dr. Shahrzad Ferrell office stated she needed to make an appt. He doesn't have anything available until March 2019. Pt was offered Aug 23rd however pt is having surgery Aug 21st. She will not be able to move around for awhile. Pt made an appt for Sept 11th     Can she have a partial script filled until her appt with Zohra Guo?

## 2018-08-10 ENCOUNTER — TELEPHONE (OUTPATIENT)
Dept: NEUROLOGY | Age: 83
End: 2018-08-10

## 2018-08-10 NOTE — TELEPHONE ENCOUNTER
----- Message from Amanda Guadalupe sent at 8/10/2018 10:03 AM EDT -----  Regarding: Babatunde Coates NP/Refill  The patient's daughter Natanael Rinaldi is requesting a call back in regards to the patient getting Rx Gabapentin.  (v)[phone] (l)[phone]

## 2018-08-10 NOTE — TELEPHONE ENCOUNTER
Spoke with patient's daughter, Ivy Morales (on HIPAA) and informed her that gabapentin 100 mg was sent to [facility] She voiced understanding.

## 2018-08-20 NOTE — PERIOP NOTES
Pre-op call made and patient given TOA. Patient instructed may have up to 8 ounces of water up to 645 am and then NPO. Patient verbalized understanding.

## 2018-08-21 ENCOUNTER — ANESTHESIA (OUTPATIENT)
Dept: SURGERY | Age: 83
DRG: 470 | End: 2018-08-21
Payer: MEDICARE

## 2018-08-21 ENCOUNTER — ANESTHESIA EVENT (OUTPATIENT)
Dept: SURGERY | Age: 83
DRG: 470 | End: 2018-08-21
Payer: MEDICARE

## 2018-08-21 ENCOUNTER — HOSPITAL ENCOUNTER (INPATIENT)
Age: 83
LOS: 1 days | Discharge: HOME HEALTH | DRG: 470 | End: 2018-08-22
Attending: ORTHOPAEDIC SURGERY | Admitting: ORTHOPAEDIC SURGERY
Payer: MEDICARE

## 2018-08-21 DIAGNOSIS — Z96.652 STATUS POST TOTAL LEFT KNEE REPLACEMENT: Primary | ICD-10-CM

## 2018-08-21 LAB
GLUCOSE BLD STRIP.AUTO-MCNC: 107 MG/DL (ref 65–100)
GLUCOSE BLD STRIP.AUTO-MCNC: 146 MG/DL (ref 65–100)
GLUCOSE BLD STRIP.AUTO-MCNC: 157 MG/DL (ref 65–100)
GLUCOSE BLD STRIP.AUTO-MCNC: 170 MG/DL (ref 65–100)
SERVICE CMNT-IMP: ABNORMAL

## 2018-08-21 PROCEDURE — 82962 GLUCOSE BLOOD TEST: CPT

## 2018-08-21 PROCEDURE — 74011250636 HC RX REV CODE- 250/636: Performed by: ORTHOPAEDIC SURGERY

## 2018-08-21 PROCEDURE — 74011000272 HC RX REV CODE- 272: Performed by: ORTHOPAEDIC SURGERY

## 2018-08-21 PROCEDURE — 97116 GAIT TRAINING THERAPY: CPT

## 2018-08-21 PROCEDURE — 77030014647 HC SEAL FBRN TISSL BAXT -D: Performed by: ORTHOPAEDIC SURGERY

## 2018-08-21 PROCEDURE — 77030032490 HC SLV COMPR SCD KNE COVD -B: Performed by: ORTHOPAEDIC SURGERY

## 2018-08-21 PROCEDURE — 76060000035 HC ANESTHESIA 2 TO 2.5 HR: Performed by: ORTHOPAEDIC SURGERY

## 2018-08-21 PROCEDURE — C1713 ANCHOR/SCREW BN/BN,TIS/BN: HCPCS | Performed by: ORTHOPAEDIC SURGERY

## 2018-08-21 PROCEDURE — G8979 MOBILITY GOAL STATUS: HCPCS

## 2018-08-21 PROCEDURE — 77030008467 HC STPLR SKN COVD -B: Performed by: ORTHOPAEDIC SURGERY

## 2018-08-21 PROCEDURE — 74011000250 HC RX REV CODE- 250: Performed by: ORTHOPAEDIC SURGERY

## 2018-08-21 PROCEDURE — 77030028907 HC WRP KNEE WO BGS SOLM -B

## 2018-08-21 PROCEDURE — 8E0YXBZ COMPUTER ASSISTED PROCEDURE OF LOWER EXTREMITY: ICD-10-PCS | Performed by: ORTHOPAEDIC SURGERY

## 2018-08-21 PROCEDURE — 74011000258 HC RX REV CODE- 258

## 2018-08-21 PROCEDURE — 77030010785: Performed by: ORTHOPAEDIC SURGERY

## 2018-08-21 PROCEDURE — 97161 PT EVAL LOW COMPLEX 20 MIN: CPT

## 2018-08-21 PROCEDURE — 94760 N-INVAS EAR/PLS OXIMETRY 1: CPT

## 2018-08-21 PROCEDURE — G8978 MOBILITY CURRENT STATUS: HCPCS

## 2018-08-21 PROCEDURE — 74011250636 HC RX REV CODE- 250/636

## 2018-08-21 PROCEDURE — 77030013708 HC HNDPC SUC IRR PULS STRY –B: Performed by: ORTHOPAEDIC SURGERY

## 2018-08-21 PROCEDURE — 74011250636 HC RX REV CODE- 250/636: Performed by: ANESTHESIOLOGY

## 2018-08-21 PROCEDURE — 77030020061 HC IV BLD WRMR ADMIN SET 3M -B: Performed by: NURSE ANESTHETIST, CERTIFIED REGISTERED

## 2018-08-21 PROCEDURE — 77030016547 HC BLD SAW SAG1 STRY -B: Performed by: ORTHOPAEDIC SURGERY

## 2018-08-21 PROCEDURE — 65270000029 HC RM PRIVATE

## 2018-08-21 PROCEDURE — 77030026438 HC STYL ET INTUB CARD -A: Performed by: NURSE ANESTHETIST, CERTIFIED REGISTERED

## 2018-08-21 PROCEDURE — C1776 JOINT DEVICE (IMPLANTABLE): HCPCS | Performed by: ORTHOPAEDIC SURGERY

## 2018-08-21 PROCEDURE — 77030019908 HC STETH ESOPH SIMS -A: Performed by: NURSE ANESTHETIST, CERTIFIED REGISTERED

## 2018-08-21 PROCEDURE — 77010033678 HC OXYGEN DAILY

## 2018-08-21 PROCEDURE — 77030008684 HC TU ET CUF COVD -B: Performed by: NURSE ANESTHETIST, CERTIFIED REGISTERED

## 2018-08-21 PROCEDURE — 77030018836 HC SOL IRR NACL ICUM -A: Performed by: ORTHOPAEDIC SURGERY

## 2018-08-21 PROCEDURE — 76210000006 HC OR PH I REC 0.5 TO 1 HR: Performed by: ORTHOPAEDIC SURGERY

## 2018-08-21 PROCEDURE — 77030003029 HC SUT VCRL J&J -B: Performed by: ORTHOPAEDIC SURGERY

## 2018-08-21 PROCEDURE — 74011250637 HC RX REV CODE- 250/637: Performed by: ORTHOPAEDIC SURGERY

## 2018-08-21 PROCEDURE — 77030003028 HC SUT VCRL J&J -A: Performed by: ORTHOPAEDIC SURGERY

## 2018-08-21 PROCEDURE — 74011250637 HC RX REV CODE- 250/637: Performed by: PHYSICIAN ASSISTANT

## 2018-08-21 PROCEDURE — 77030028224 HC PDNG CST BSNM -A: Performed by: ORTHOPAEDIC SURGERY

## 2018-08-21 PROCEDURE — 77030000032 HC CUF TRNQT ZIMM -B: Performed by: ORTHOPAEDIC SURGERY

## 2018-08-21 PROCEDURE — 0SRD0J9 REPLACEMENT OF LEFT KNEE JOINT WITH SYNTHETIC SUBSTITUTE, CEMENTED, OPEN APPROACH: ICD-10-PCS | Performed by: ORTHOPAEDIC SURGERY

## 2018-08-21 PROCEDURE — 77030011640 HC PAD GRND REM COVD -A: Performed by: ORTHOPAEDIC SURGERY

## 2018-08-21 PROCEDURE — 77030020782 HC GWN BAIR PAWS FLX 3M -B

## 2018-08-21 PROCEDURE — 77030018846 HC SOL IRR STRL H20 ICUM -A: Performed by: ORTHOPAEDIC SURGERY

## 2018-08-21 PROCEDURE — 77030018719 HC DRSG PTCH ANTIMIC J&J -A: Performed by: ORTHOPAEDIC SURGERY

## 2018-08-21 PROCEDURE — 74011250636 HC RX REV CODE- 250/636: Performed by: PHYSICIAN ASSISTANT

## 2018-08-21 PROCEDURE — 74011000250 HC RX REV CODE- 250

## 2018-08-21 PROCEDURE — 77030020788: Performed by: ORTHOPAEDIC SURGERY

## 2018-08-21 PROCEDURE — 76010000171 HC OR TIME 2 TO 2.5 HR INTENSV-TIER 1: Performed by: ORTHOPAEDIC SURGERY

## 2018-08-21 DEVICE — CEMENT BNE 20ML 41GM FULL DOSE PMMA W/ TOBRA M VISC RADPQ: Type: IMPLANTABLE DEVICE | Site: KNEE | Status: FUNCTIONAL

## 2018-08-21 DEVICE — ASYMMETRIC PATELLA
Type: IMPLANTABLE DEVICE | Site: KNEE | Status: FUNCTIONAL
Brand: TRIATHLON

## 2018-08-21 DEVICE — TIBIAL BEARING INSERT
Type: IMPLANTABLE DEVICE | Site: KNEE | Status: FUNCTIONAL
Brand: TRIATHLON

## 2018-08-21 DEVICE — COMPONENT KNEE CEM X3 TRIATHLON: Type: IMPLANTABLE DEVICE | Status: FUNCTIONAL

## 2018-08-21 DEVICE — CRUCIATE RETAINING FEMORAL
Type: IMPLANTABLE DEVICE | Site: KNEE | Status: FUNCTIONAL
Brand: TRIATHLON

## 2018-08-21 DEVICE — UNIVERSAL TIBIAL BASEPLATE
Type: IMPLANTABLE DEVICE | Site: KNEE | Status: FUNCTIONAL
Brand: TRIATHLON

## 2018-08-21 RX ORDER — HYDROMORPHONE HYDROCHLORIDE 2 MG/ML
0.5 INJECTION, SOLUTION INTRAMUSCULAR; INTRAVENOUS; SUBCUTANEOUS
Status: DISCONTINUED | OUTPATIENT
Start: 2018-08-21 | End: 2018-08-22 | Stop reason: HOSPADM

## 2018-08-21 RX ORDER — MAGNESIUM SULFATE 100 %
4 CRYSTALS MISCELLANEOUS AS NEEDED
Status: DISCONTINUED | OUTPATIENT
Start: 2018-08-21 | End: 2018-08-22 | Stop reason: HOSPADM

## 2018-08-21 RX ORDER — FAMOTIDINE 20 MG/1
20 TABLET, FILM COATED ORAL 2 TIMES DAILY
Status: DISCONTINUED | OUTPATIENT
Start: 2018-08-21 | End: 2018-08-21

## 2018-08-21 RX ORDER — ROCURONIUM BROMIDE 10 MG/ML
INJECTION, SOLUTION INTRAVENOUS AS NEEDED
Status: DISCONTINUED | OUTPATIENT
Start: 2018-08-21 | End: 2018-08-21 | Stop reason: HOSPADM

## 2018-08-21 RX ORDER — DEXAMETHASONE SODIUM PHOSPHATE 4 MG/ML
INJECTION, SOLUTION INTRA-ARTICULAR; INTRALESIONAL; INTRAMUSCULAR; INTRAVENOUS; SOFT TISSUE AS NEEDED
Status: DISCONTINUED | OUTPATIENT
Start: 2018-08-21 | End: 2018-08-21 | Stop reason: HOSPADM

## 2018-08-21 RX ORDER — NALOXONE HYDROCHLORIDE 0.4 MG/ML
0.4 INJECTION, SOLUTION INTRAMUSCULAR; INTRAVENOUS; SUBCUTANEOUS AS NEEDED
Status: DISCONTINUED | OUTPATIENT
Start: 2018-08-21 | End: 2018-08-22 | Stop reason: HOSPADM

## 2018-08-21 RX ORDER — SODIUM CHLORIDE 0.9 % (FLUSH) 0.9 %
5-10 SYRINGE (ML) INJECTION EVERY 8 HOURS
Status: DISCONTINUED | OUTPATIENT
Start: 2018-08-22 | End: 2018-08-22 | Stop reason: HOSPADM

## 2018-08-21 RX ORDER — ONDANSETRON 2 MG/ML
INJECTION INTRAMUSCULAR; INTRAVENOUS AS NEEDED
Status: DISCONTINUED | OUTPATIENT
Start: 2018-08-21 | End: 2018-08-21 | Stop reason: HOSPADM

## 2018-08-21 RX ORDER — DIPHENHYDRAMINE HCL 12.5MG/5ML
12.5 ELIXIR ORAL
Status: DISCONTINUED | OUTPATIENT
Start: 2018-08-21 | End: 2018-08-22 | Stop reason: HOSPADM

## 2018-08-21 RX ORDER — AMOXICILLIN 250 MG
1 CAPSULE ORAL 2 TIMES DAILY
Status: DISCONTINUED | OUTPATIENT
Start: 2018-08-21 | End: 2018-08-22 | Stop reason: HOSPADM

## 2018-08-21 RX ORDER — PREGABALIN 75 MG/1
75 CAPSULE ORAL ONCE
Status: COMPLETED | OUTPATIENT
Start: 2018-08-21 | End: 2018-08-21

## 2018-08-21 RX ORDER — PROPOFOL 10 MG/ML
INJECTION, EMULSION INTRAVENOUS AS NEEDED
Status: DISCONTINUED | OUTPATIENT
Start: 2018-08-21 | End: 2018-08-21 | Stop reason: HOSPADM

## 2018-08-21 RX ORDER — CELECOXIB 200 MG/1
200 CAPSULE ORAL ONCE
Status: COMPLETED | OUTPATIENT
Start: 2018-08-21 | End: 2018-08-21

## 2018-08-21 RX ORDER — SODIUM CHLORIDE 0.9 % (FLUSH) 0.9 %
5-10 SYRINGE (ML) INJECTION AS NEEDED
Status: DISCONTINUED | OUTPATIENT
Start: 2018-08-21 | End: 2018-08-22 | Stop reason: HOSPADM

## 2018-08-21 RX ORDER — ATORVASTATIN CALCIUM 20 MG/1
20 TABLET, FILM COATED ORAL DAILY
Status: DISCONTINUED | OUTPATIENT
Start: 2018-08-22 | End: 2018-08-22 | Stop reason: HOSPADM

## 2018-08-21 RX ORDER — ONDANSETRON 2 MG/ML
4 INJECTION INTRAMUSCULAR; INTRAVENOUS
Status: DISCONTINUED | OUTPATIENT
Start: 2018-08-21 | End: 2018-08-22 | Stop reason: HOSPADM

## 2018-08-21 RX ORDER — SODIUM CHLORIDE 9 MG/ML
125 INJECTION, SOLUTION INTRAVENOUS CONTINUOUS
Status: DISPENSED | OUTPATIENT
Start: 2018-08-21 | End: 2018-08-22

## 2018-08-21 RX ORDER — FACIAL-BODY WIPES
10 EACH TOPICAL DAILY PRN
Status: DISCONTINUED | OUTPATIENT
Start: 2018-08-23 | End: 2018-08-22 | Stop reason: HOSPADM

## 2018-08-21 RX ORDER — ASPIRIN 325 MG
325 TABLET, DELAYED RELEASE (ENTERIC COATED) ORAL 2 TIMES DAILY
Status: DISCONTINUED | OUTPATIENT
Start: 2018-08-21 | End: 2018-08-22 | Stop reason: HOSPADM

## 2018-08-21 RX ORDER — CEFAZOLIN SODIUM/WATER 2 G/20 ML
2 SYRINGE (ML) INTRAVENOUS EVERY 8 HOURS
Status: COMPLETED | OUTPATIENT
Start: 2018-08-21 | End: 2018-08-22

## 2018-08-21 RX ORDER — CITALOPRAM 20 MG/1
20 TABLET, FILM COATED ORAL DAILY
Status: DISCONTINUED | OUTPATIENT
Start: 2018-08-22 | End: 2018-08-22 | Stop reason: HOSPADM

## 2018-08-21 RX ORDER — OXYCODONE HYDROCHLORIDE 5 MG/1
10 TABLET ORAL
Status: DISCONTINUED | OUTPATIENT
Start: 2018-08-21 | End: 2018-08-22 | Stop reason: HOSPADM

## 2018-08-21 RX ORDER — INSULIN LISPRO 100 [IU]/ML
INJECTION, SOLUTION INTRAVENOUS; SUBCUTANEOUS
Status: DISCONTINUED | OUTPATIENT
Start: 2018-08-21 | End: 2018-08-22 | Stop reason: HOSPADM

## 2018-08-21 RX ORDER — ACETAMINOPHEN 325 MG/1
650 TABLET ORAL ONCE
Status: COMPLETED | OUTPATIENT
Start: 2018-08-21 | End: 2018-08-21

## 2018-08-21 RX ORDER — PANTOPRAZOLE SODIUM 40 MG/1
40 TABLET, DELAYED RELEASE ORAL DAILY
Status: DISCONTINUED | OUTPATIENT
Start: 2018-08-22 | End: 2018-08-22 | Stop reason: HOSPADM

## 2018-08-21 RX ORDER — CEFAZOLIN SODIUM/WATER 2 G/20 ML
2 SYRINGE (ML) INTRAVENOUS ONCE
Status: COMPLETED | OUTPATIENT
Start: 2018-08-21 | End: 2018-08-21

## 2018-08-21 RX ORDER — DEXTROSE 50 % IN WATER (D50W) INTRAVENOUS SYRINGE
12.5-25 AS NEEDED
Status: DISCONTINUED | OUTPATIENT
Start: 2018-08-21 | End: 2018-08-22 | Stop reason: HOSPADM

## 2018-08-21 RX ORDER — GABAPENTIN 100 MG/1
100 CAPSULE ORAL 3 TIMES DAILY
Status: DISCONTINUED | OUTPATIENT
Start: 2018-08-21 | End: 2018-08-22 | Stop reason: HOSPADM

## 2018-08-21 RX ORDER — POLYETHYLENE GLYCOL 3350 17 G/17G
17 POWDER, FOR SOLUTION ORAL DAILY
Status: DISCONTINUED | OUTPATIENT
Start: 2018-08-22 | End: 2018-08-22 | Stop reason: HOSPADM

## 2018-08-21 RX ORDER — ACETAMINOPHEN 325 MG/1
650 TABLET ORAL EVERY 6 HOURS
Status: DISCONTINUED | OUTPATIENT
Start: 2018-08-21 | End: 2018-08-22 | Stop reason: HOSPADM

## 2018-08-21 RX ORDER — OXYCODONE HYDROCHLORIDE 5 MG/1
5 TABLET ORAL
Status: DISCONTINUED | OUTPATIENT
Start: 2018-08-21 | End: 2018-08-22 | Stop reason: HOSPADM

## 2018-08-21 RX ORDER — SUCCINYLCHOLINE CHLORIDE 20 MG/ML
INJECTION INTRAMUSCULAR; INTRAVENOUS AS NEEDED
Status: DISCONTINUED | OUTPATIENT
Start: 2018-08-21 | End: 2018-08-21 | Stop reason: HOSPADM

## 2018-08-21 RX ORDER — SODIUM CHLORIDE, SODIUM LACTATE, POTASSIUM CHLORIDE, CALCIUM CHLORIDE 600; 310; 30; 20 MG/100ML; MG/100ML; MG/100ML; MG/100ML
25 INJECTION, SOLUTION INTRAVENOUS CONTINUOUS
Status: DISCONTINUED | OUTPATIENT
Start: 2018-08-21 | End: 2018-08-21 | Stop reason: HOSPADM

## 2018-08-21 RX ORDER — GLYCOPYRROLATE 0.2 MG/ML
INJECTION INTRAMUSCULAR; INTRAVENOUS AS NEEDED
Status: DISCONTINUED | OUTPATIENT
Start: 2018-08-21 | End: 2018-08-21 | Stop reason: HOSPADM

## 2018-08-21 RX ORDER — LIDOCAINE HYDROCHLORIDE 20 MG/ML
INJECTION, SOLUTION EPIDURAL; INFILTRATION; INTRACAUDAL; PERINEURAL AS NEEDED
Status: DISCONTINUED | OUTPATIENT
Start: 2018-08-21 | End: 2018-08-21 | Stop reason: HOSPADM

## 2018-08-21 RX ORDER — FENTANYL CITRATE 50 UG/ML
INJECTION, SOLUTION INTRAMUSCULAR; INTRAVENOUS AS NEEDED
Status: DISCONTINUED | OUTPATIENT
Start: 2018-08-21 | End: 2018-08-21 | Stop reason: HOSPADM

## 2018-08-21 RX ORDER — HYDROMORPHONE HYDROCHLORIDE 2 MG/ML
INJECTION, SOLUTION INTRAMUSCULAR; INTRAVENOUS; SUBCUTANEOUS AS NEEDED
Status: DISCONTINUED | OUTPATIENT
Start: 2018-08-21 | End: 2018-08-21 | Stop reason: HOSPADM

## 2018-08-21 RX ORDER — NEOSTIGMINE METHYLSULFATE 1 MG/ML
INJECTION INTRAVENOUS AS NEEDED
Status: DISCONTINUED | OUTPATIENT
Start: 2018-08-21 | End: 2018-08-21 | Stop reason: HOSPADM

## 2018-08-21 RX ORDER — PHENYLEPHRINE HCL IN 0.9% NACL 0.4MG/10ML
SYRINGE (ML) INTRAVENOUS AS NEEDED
Status: DISCONTINUED | OUTPATIENT
Start: 2018-08-21 | End: 2018-08-21 | Stop reason: HOSPADM

## 2018-08-21 RX ORDER — DEXAMETHASONE SODIUM PHOSPHATE 4 MG/ML
10 INJECTION, SOLUTION INTRA-ARTICULAR; INTRALESIONAL; INTRAMUSCULAR; INTRAVENOUS; SOFT TISSUE DAILY
Status: DISCONTINUED | OUTPATIENT
Start: 2018-08-22 | End: 2018-08-22 | Stop reason: HOSPADM

## 2018-08-21 RX ADMIN — Medication 2 G: at 11:21

## 2018-08-21 RX ADMIN — FENTANYL CITRATE 25 MCG: 50 INJECTION, SOLUTION INTRAMUSCULAR; INTRAVENOUS at 11:42

## 2018-08-21 RX ADMIN — SUCCINYLCHOLINE CHLORIDE 100 MG: 20 INJECTION INTRAMUSCULAR; INTRAVENOUS at 11:11

## 2018-08-21 RX ADMIN — ACETAMINOPHEN 650 MG: 325 TABLET ORAL at 09:30

## 2018-08-21 RX ADMIN — Medication 80 MCG: at 11:28

## 2018-08-21 RX ADMIN — Medication 80 MCG: at 11:24

## 2018-08-21 RX ADMIN — ROCURONIUM BROMIDE 5 MG: 10 INJECTION, SOLUTION INTRAVENOUS at 11:11

## 2018-08-21 RX ADMIN — GABAPENTIN 100 MG: 100 CAPSULE ORAL at 21:05

## 2018-08-21 RX ADMIN — ROCURONIUM BROMIDE 10 MG: 10 INJECTION, SOLUTION INTRAVENOUS at 12:16

## 2018-08-21 RX ADMIN — OXYCODONE HYDROCHLORIDE 5 MG: 5 TABLET ORAL at 16:45

## 2018-08-21 RX ADMIN — ACETAMINOPHEN 650 MG: 325 TABLET ORAL at 23:46

## 2018-08-21 RX ADMIN — Medication 2 G: at 20:24

## 2018-08-21 RX ADMIN — Medication 80 MCG: at 11:14

## 2018-08-21 RX ADMIN — SENNOSIDES AND DOCUSATE SODIUM 1 TABLET: 8.6; 5 TABLET ORAL at 17:21

## 2018-08-21 RX ADMIN — PROPOFOL 100 MG: 10 INJECTION, EMULSION INTRAVENOUS at 11:11

## 2018-08-21 RX ADMIN — FENTANYL CITRATE 25 MCG: 50 INJECTION, SOLUTION INTRAMUSCULAR; INTRAVENOUS at 11:00

## 2018-08-21 RX ADMIN — GABAPENTIN 100 MG: 100 CAPSULE ORAL at 15:45

## 2018-08-21 RX ADMIN — DEXAMETHASONE SODIUM PHOSPHATE 8 MG: 4 INJECTION, SOLUTION INTRA-ARTICULAR; INTRALESIONAL; INTRAMUSCULAR; INTRAVENOUS; SOFT TISSUE at 11:34

## 2018-08-21 RX ADMIN — ROCURONIUM BROMIDE 15 MG: 10 INJECTION, SOLUTION INTRAVENOUS at 11:26

## 2018-08-21 RX ADMIN — ONDANSETRON 4 MG: 2 INJECTION INTRAMUSCULAR; INTRAVENOUS at 12:39

## 2018-08-21 RX ADMIN — HYDROMORPHONE HYDROCHLORIDE 0.5 MG: 2 INJECTION, SOLUTION INTRAMUSCULAR; INTRAVENOUS; SUBCUTANEOUS at 12:50

## 2018-08-21 RX ADMIN — SODIUM CHLORIDE, SODIUM LACTATE, POTASSIUM CHLORIDE, AND CALCIUM CHLORIDE 25 ML/HR: 600; 310; 30; 20 INJECTION, SOLUTION INTRAVENOUS at 09:30

## 2018-08-21 RX ADMIN — ACETAMINOPHEN 650 MG: 325 TABLET ORAL at 15:45

## 2018-08-21 RX ADMIN — FENTANYL CITRATE 50 MCG: 50 INJECTION, SOLUTION INTRAMUSCULAR; INTRAVENOUS at 11:11

## 2018-08-21 RX ADMIN — ASPIRIN 325 MG: 325 TABLET, DELAYED RELEASE ORAL at 17:21

## 2018-08-21 RX ADMIN — CELECOXIB 200 MG: 200 CAPSULE ORAL at 09:30

## 2018-08-21 RX ADMIN — OXYCODONE HYDROCHLORIDE 5 MG: 5 TABLET ORAL at 15:45

## 2018-08-21 RX ADMIN — NEOSTIGMINE METHYLSULFATE 3 MG: 1 INJECTION INTRAVENOUS at 12:43

## 2018-08-21 RX ADMIN — Medication 80 MCG: at 12:24

## 2018-08-21 RX ADMIN — GLYCOPYRROLATE 0.5 MG: 0.2 INJECTION INTRAMUSCULAR; INTRAVENOUS at 12:43

## 2018-08-21 RX ADMIN — SODIUM CHLORIDE 125 ML/HR: 900 INJECTION, SOLUTION INTRAVENOUS at 13:33

## 2018-08-21 RX ADMIN — PREGABALIN 75 MG: 75 CAPSULE ORAL at 09:30

## 2018-08-21 RX ADMIN — LIDOCAINE HYDROCHLORIDE 80 MG: 20 INJECTION, SOLUTION EPIDURAL; INFILTRATION; INTRACAUDAL; PERINEURAL at 11:11

## 2018-08-21 NOTE — ANESTHESIA PREPROCEDURE EVALUATION
Anesthetic History   No history of anesthetic complications            Review of Systems / Medical History  Patient summary reviewed, nursing notes reviewed and pertinent labs reviewed    Pulmonary  Within defined limits                 Neuro/Psych         Psychiatric history     Cardiovascular    Hypertension              Exercise tolerance: >4 METS     GI/Hepatic/Renal     GERD: well controlled           Endo/Other    Diabetes: type 2    Arthritis     Other Findings              Physical Exam    Airway  Mallampati: II  TM Distance: 4 - 6 cm  Neck ROM: normal range of motion   Mouth opening: Normal     Cardiovascular  Regular rate and rhythm,  S1 and S2 normal,  no murmur, click, rub, or gallop             Dental    Dentition: Caps/crowns, Lower dentition intact and Upper dentition intact     Pulmonary  Breath sounds clear to auscultation               Abdominal  GI exam deferred       Other Findings            Anesthetic Plan    ASA: 2  Anesthesia type: general          Induction: Intravenous  Anesthetic plan and risks discussed with: Patient

## 2018-08-21 NOTE — PERIOP NOTES
Handoff Report from Operating Room to PACU    Report received from EFREM Bui RN and Serina Rubio CRNA regarding Annabella Philippe. Surgeon(s):  Sulma Byrd MD  And Procedure(s) (LRB):  LEFT TOTAL KNEE ARTHROPLASTY WITH NAVIGATION  (Left)  confirmed   with allergies, dressings and local anesthetic discussed. Anesthesia type, drugs, patient history, complications, estimated blood loss, vital signs, intake and output, and last pain medication, lines, reversal medications and temperature were reviewed.

## 2018-08-21 NOTE — PROGRESS NOTES
Ortho/ NeuroSurgery NP Note    POD# 0  s/p LEFT TOTAL KNEE ARTHROPLASTY WITH NAVIGATION      Pt resting in bed. No complaints. Having pain that is slightly relieved with one pill but patient requesting a second one. VSS Afebrile. Patient has had something to eat/drink. No nausea. Most Recent Labs:   Lab Results   Component Value Date/Time    HGB 12.1 08/07/2018 01:36 PM    Hemoglobin A1c 6.2 08/07/2018 01:36 PM       Body mass index is 26.53 kg/(m^2). Reference: BMI greater than 30 is classified as obesity and greater than 40 is classified as morbid obesity. STOP BANG Score: 2    Voiding status: Patient needs to void today. Dressing c.d.i    Calves soft and supple; No pain with passive stretch  Sensation and motor intact  SCDs for mechanical DVT proph    Plan:  1) PT BID starting today  2) Aranza-op Antibiotics Ancef  3) Aspirin 325 mg PO BID for DVT Prophylaxis  4) Protonix for GI Prophylaxis  5) Discharge plans to home with her son.      Santana Ward NP

## 2018-08-21 NOTE — PROGRESS NOTES
Problem: Mobility Impaired (Adult and Pediatric)  Goal: *Acute Goals and Plan of Care (Insert Text)  Physical Therapy Goals  Initiated 8/21/2018    1. Patient will move from supine to sit and sit to supine , scoot up and down and roll side to side in bed with modified independence within 4 days. 2. Patient will perform sit to stand with modified independence within 4 days. 3. Patient will ambulate with modified independence for 200 feet with the least restrictive device within 4 days. 4. Patient will ascend/descend 4 stairs with 1 handrail(s) with modified independence within 4 days. 5. Patient will perform home exercise program per protocol with independence within 4 days. 6. Patient will demonstrate AROM 0-90 degrees in operative joint within 4 days. physical Therapy EVALUATION  Patient: Donna Chavez (40 y.o. female)  Date: 8/21/2018  Primary Diagnosis: LEFT KNEE OSTEOARTHRITIS  'light-for-dates' infant with signs of fetal malnutrition  Procedure(s) (LRB):  LEFT TOTAL KNEE ARTHROPLASTY WITH NAVIGATION  (Left) Day of Surgery   Precautions:   WBAT    ASSESSMENT :  Based on the objective data described below, the patient presents with increased pain levels, orthostatic hypotension, drowsiness/lethargy, impaired balance, impaired gait mechanics, generalized weakness, and overall impaired functional mobility on POD 0 following L TKR. Pt received supine in bed, agreeable to participation with therapy. Pt required CGA in order to lift L LE towards EOB. Remaining functional mobility occurred with CG/minAx1 including sit<>stand transfers and ambulation with RW support. Pt with poor tolerance of L LE weightbearing secondary to increased pain levels, exhibiting antalgic, step-to gait pattern with increased trunk flexion. Significantly decreased gait speed. Vital signs assessed throughout mobility and remained stable/pt negative for orthostatic hypotension.  However, upon ambulation attempt/prolonged upright activity, pt noted to become less responsive and was returned to supine position in bed with vital signs assessed and BP noted to have decreased to 65/37. Pt performed ankle pumps bilaterally with BP recovering to 103/50. RN notified. Pending further progress with therapy, discharge recommendations TBD, however recommend [facility] PT w/ increased assist of family at this time. Patient will benefit from skilled intervention to address the above impairments. Patients rehabilitation potential is considered to be Good  Factors which may influence rehabilitation potential include:   []         None noted  []         Mental ability/status  []         Medical condition  []         Home/family situation and support systems  []         Safety awareness  []         Pain tolerance/management  []         Other:      PLAN :  Recommendations and Planned Interventions:  [x]           Bed Mobility Training             []    Neuromuscular Re-Education  [x]           Transfer Training                   []    Orthotic/Prosthetic Training  [x]           Gait Training                         []    Modalities  [x]           Therapeutic Exercises           []    Edema Management/Control  [x]           Therapeutic Activities            [x]    Patient and Family Training/Education  [x]           Other (comment):stair climbing     Frequency/Duration: Patient will be followed by physical therapy  twice daily to address goals. Discharge Recommendations: Home Health w/ increased family support  Further Equipment Recommendations for Discharge: Owns RW and necessary equipment     SUBJECTIVE:   Patient stated I'm with you.     OBJECTIVE DATA SUMMARY:   HISTORY:    Past Medical History:   Diagnosis Date    Arthritis     Chronic pain     knee    Diabetes ([address]     GERD (gastroesophageal reflux disease)     Hypertension     Ill-defined condition     seaonal allergies    Psychiatric disorder     anxiety     Past Surgical History:   Procedure Laterality Date    HX CHOLECYSTECTOMY      HX ÓSCAR AND BSO      MT COLONOSCOPY FLX DX W/COLLJ SPEC WHEN PFRMD  11/21/2013         TOTAL KNEE ARTHROPLASTY  2016    right    UPPER GI ENDOSCOPY,BIOPSY  11/3/2015          Prior Level of Function/Home Situation: Pt lives at home w/ son who she states will assist at discharge. Pt is mod I with use of SPC during mobility. Denies history of falls. Does not drive  Personal factors and/or comorbidities impacting plan of care:     Home Situation  Home Environment: Private residence  # Steps to Enter: 4  Rails to Enter: Yes  Hand Rails : Right  One/Two Story Residence: Two story, live on 1st floor  Living Alone: No  Support Systems: Child(deisy), Family member(s)  Patient Expects to be Discharged to[de-identified] Private residence  Current DME Used/Available at Home: Leonard Benne, straight, Walker, rolling, Shower chair, Grab bars  Tub or Shower Type: Tub/Shower combination    EXAMINATION/PRESENTATION/DECISION MAKING:   Critical Behavior:  Neurologic State: Alert  Orientation Level: Oriented X4  Cognition: Follows commands     Hearing:     Skin:  Dressing clean, dry, intact  Edema: none noted   Range Of Motion:  AROM: Generally decreased, functional                       Strength:    Strength: Generally decreased, functional                    Tone & Sensation:   Tone: Normal              Sensation: Intact               Coordination:  Coordination: Within functional limits  Vision:      Functional Mobility:  Bed Mobility:  Rolling: Contact guard assistance (to lift L LE)  Supine to Sit: Contact guard assistance  Sit to Supine: Contact guard assistance  Scooting: Contact guard assistance  Transfers:  Sit to Stand: Contact guard assistance  Stand to Sit: Contact guard assistance                       Balance:   Sitting: Intact  Standing: Impaired; With support  Standing - Static: Good;Constant support  Standing - Dynamic : Fair  Ambulation/Gait Training:  Distance (ft): 10 Feet (ft)  Assistive Device: Walker, rolling;Gait belt  Ambulation - Level of Assistance: Contact guard assistance        Gait Abnormalities: Antalgic; Step to gait              Speed/Yissel: Pace decreased (<100 feet/min); Slow  Step Length: Right shortened;Left shortened (R>L)                      Functional Measure:  Barthel Index:    Bathin  Bladder: 5  Bowels: 10  Groomin  Dressin  Feeding: 10  Mobility: 0  Stairs: 0  Toilet Use: 0  Transfer (Bed to Chair and Back): 5  Total: 40       Barthel and G-code impairment scale:  Percentage of impairment CH  0% CI  1-19% CJ  20-39% CK  40-59% CL  60-79% CM  80-99% CN  100%   Barthel Score 0-100 100 99-80 79-60 59-40 20-39 1-19   0   Barthel Score 0-20 20 17-19 13-16 9-12 5-8 1-4 0      The Barthel ADL Index: Guidelines  1. The index should be used as a record of what a patient does, not as a record of what a patient could do. 2. The main aim is to establish degree of independence from any help, physical or verbal, however minor and for whatever reason. 3. The need for supervision renders the patient not independent. 4. A patient's performance should be established using the best available evidence. Asking the patient, friends/relatives and nurses are the usual sources, but direct observation and common sense are also important. However direct testing is not needed. 5. Usually the patient's performance over the preceding 24-48 hours is important, but occasionally longer periods will be relevant. 6. Middle categories imply that the patient supplies over 50 per cent of the effort. 7. Use of aids to be independent is allowed. Madina Marshall., Barthel, D.W. (1325). Functional evaluation: the Barthel Index. [facility] (14)2. ALBERT Matute, Dorothy Dillon., Arleth Rodarte, [place], [facility] (). Measuring the change indisability after inpatient rehabilitation; comparison of the responsiveness of the Barthel Index and Functional [place] Measure.  Journal of Neurology, Neurosurgery, and Psychiatry, 66(4), 351-179. TORIE Coto.HOMA, SUZI Newton, & Nathan Hall M.A. (2004.) Assessment of post-stroke quality of life in cost-effectiveness studies: The usefulness of the Barthel Index and the EuroQoL-5D. Quality of Life Research, 13, 825-05       G codes: In compliance with CMSs Claims Based Outcome Reporting, the following G-code set was chosen for this patient based on their primary functional limitation being treated: The outcome measure chosen to determine the severity of the functional limitation was the Barthel Index with a score of 40/100 which was correlated with the impairment scale. ? Mobility - Walking and Moving Around:     - CURRENT STATUS: CK - 40%-59% impaired, limited or restricted    - GOAL STATUS: CI - 1%-19% impaired, limited or restricted    - D/C STATUS:  ---------------To be determined---------------      Physical Therapy Evaluation Charge Determination   History Examination Presentation Decision-Making   MEDIUM  Complexity : 1-2 comorbidities / personal factors will impact the outcome/ POC  MEDIUM Complexity : 3 Standardized tests and measures addressing body structure, function, activity limitation and / or participation in recreation  MEDIUM Complexity : Evolving with changing characteristics  MEDIUM Complexity : FOTO score of 26-74      Based on the above components, the patient evaluation is determined to be of the following complexity level: MEDIUM    Pain:  Pain Scale 1: Numeric (0 - 10)  Pain Intensity 1: 8  Pain Location 1: Knee  Pain Orientation 1: Left  Pain Description 1: Aching     Activity Tolerance:   Orthostatic hypotension, resolved with pt in supine position in bed  Please refer to the flowsheet for vital signs taken during this treatment.   After treatment:   []         Patient left in no apparent distress sitting up in chair  [x]         Patient left in no apparent distress in bed  [x]         Call bermudez left within reach  [x]         Nursing notified  [x]         Caregiver present  []         Bed alarm activated    COMMUNICATION/EDUCATION:   The patients plan of care was discussed with: Registered Nurse. [x]         Fall prevention education was provided and the patient/caregiver indicated understanding. [x]         Patient/family have participated as able in goal setting and plan of care. [x]         Patient/family agree to work toward stated goals and plan of care. []         Patient understands intent and goals of therapy, but is neutral about his/her participation. []         Patient is unable to participate in goal setting and plan of care.     Thank you for this referral.  Bryn Gore, PT, DPT   Time Calculation: 19 mins

## 2018-08-21 NOTE — PERIOP NOTES
TRANSFER - OUT REPORT:    Verbal report given to [facility] RN(name) on Elin  being transferred to [facility](unit) for routine post - op       Report consisted of patients Situation, Background, Assessment and   Recommendations(SBAR). Information from the following report(s) SBAR, OR Summary, Intake/Output and Recent Results was reviewed with the receiving nurse. Opportunity for questions and clarification was provided.       Patient transported with:   O2 @ 2 liters  Tech     Family notified of transfer by volunteer,

## 2018-08-21 NOTE — IP AVS SNAPSHOT
[address] [facility] 
[phone] Patient: Lakisha Redman MRN: [id number] BEP:4/48[birth year]14 About your hospitalization You were admitted on:  August 21, 2018 You last received care in the:  [facility] 3 ORTHOPEDICS You were discharged on:  August 22, 2018 Why you were hospitalized Your primary diagnosis was:  Status Post Total Left Knee Replacement Follow-up Information Follow up With Details Comments Contact Info Familia Ocmapo MD In 2 weeks  [address] [facility] [address] [facility] 
[phone] Db Ramirez MD   [address] [facility] 
[phone] [address] [place] [address]  This is your post acute care provider  [address] 
1st Floor [place] [address] 
[phone] Your Scheduled Appointments Tuesday September 11, 2018 11:30 AM EDT Follow Up with [facility] at [address]  
[phone] Discharge Orders None A check kelin indicates which time of day the medication should be taken. My Medications START taking these medications Instructions Each Dose to Equal  
 Morning Noon Evening Bedtime  
 acetaminophen 325 mg tablet Commonly known as:  TYLENOL Your last dose was: Your next dose is: Take 2 Tabs by mouth every six (6) hours for 14 days. 650 mg  
    
   
   
   
  
 aspirin delayed-release 325 mg tablet Your last dose was: Your next dose is: Take 1 Tab by mouth two (2) times a day for 30 days. 325 mg  
    
   
   
   
  
 oxyCODONE IR 5 mg immediate release tablet Commonly known as:  Nette Johnson Your last dose was: Your next dose is: Take 1-2 Tabs by mouth every four (4) hours as needed. Max Daily Amount: 60 mg. If insurance prior authorization or quantity restrictions apply, refer to and look up diagnosis code one prescription. Partial fill as needed is permitted. Please do not contact prescriber. 5-10 mg  
    
   
   
   
  
 polyethylene glycol 17 gram packet Commonly known as:  Leata Sans Your last dose was: Your next dose is: Take 1 Packet by mouth daily as needed (constipation) for up to 15 days. 17 g  
    
   
   
   
  
 senna-docusate 8.6-50 mg per tablet Commonly known as:  Kelli Fragaa Your last dose was: Your next dose is: Take 1 Tab by mouth daily. 1 Tab CONTINUE taking these medications Instructions Each Dose to Equal  
 Morning Noon Evening Bedtime  
 atorvastatin 20 mg tablet Commonly known as:  LIPITOR Your last dose was: Your next dose is: Take 20 mg by mouth daily. 20 mg  
    
   
   
   
  
 citalopram 20 mg tablet Commonly known as:  Joselin Mutters Your last dose was: Your next dose is: Take 20 mg by mouth daily. 20 mg  
    
   
   
   
  
 diclofenac 1 % Gel Commonly known as:  VOLTAREN Your last dose was: Your next dose is:    
   
   
 Apply 2 g to affected area four (4) times daily as needed. Apply to feet. 2 g  
    
   
   
   
  
 gabapentin 100 mg capsule Commonly known as:  NEURONTIN Your last dose was: Your next dose is: TAKE 1 CAPSULE BY MOUTH THREE TIMES DAILY  
     
   
   
   
  
 hydroCHLOROthiazide 25 mg tablet Commonly known as:  HYDRODIURIL Your last dose was: Your next dose is: Take 1 Tab by mouth daily. Restart when Blood Pressure is greater than 120/80  
 25 mg  
    
   
   
   
  
 metFORMIN  mg tablet Commonly known as:  GLUCOPHAGE XR  
   
 Your last dose was: Your next dose is:    
   
   
 TK 1 T PO ONCE QD WITH DINNER  
     
   
   
   
  
 omeprazole 20 mg capsule Commonly known as:  PRILOSEC Your last dose was: Your next dose is: Take 20 mg by mouth daily. 20 mg  
    
   
   
   
  
 VITAMIN B-12 PO Your last dose was: Your next dose is: Take  by mouth. VITAMIN D3 PO Your last dose was: Your next dose is: Take  by mouth daily. ZyrTEC 10 mg tablet Generic drug:  cetirizine Your last dose was: Your next dose is: Take  by mouth as needed for Allergies. Where to Get Your Medications Information on where to get these meds will be given to you by the nurse or doctor. ! Ask your nurse or doctor about these medications  
  acetaminophen 325 mg tablet  
 aspirin delayed-release 325 mg tablet  
 oxyCODONE IR 5 mg immediate release tablet  
 polyethylene glycol 17 gram packet  
 senna-docusate 8.6-50 mg per tablet Opioid Education Prescription Opioids: What You Need to Know: 
 
Prescription opioids can be used to help relieve moderate-to-severe pain and are often prescribed following a surgery or injury, or for certain health conditions. These medications can be an important part of treatment but also come with serious risks. Opioids are strong pain medicines. Examples include hydrocodone, oxycodone, fentanyl, and morphine. Heroin is an example of an illegal opioid. It is important to work with your health care provider to make sure you are getting the safest, most effective care. WHAT ARE THE RISKS AND SIDE EFFECTS OF OPIOID USE? Prescription opioids carry serious risks of addiction and overdose, especially with prolonged use.   An opioid overdose, often marked by slow breathing, can cause sudden death. The use of prescription opioids can have a number of side effects as well, even when taken as directed. · Tolerance-meaning you might need to take more of a medication for the same pain relief · Physical dependence-meaning you have symptoms of withdrawal when the medication is stopped. Withdrawal symptoms can include nausea, sweating, chills, diarrhea, stomach cramps, and muscle aches. Withdrawal can last up to several weeks, depending on which drug you took and how long you took it. · Increased sensitivity to pain · Constipation · Nausea, vomiting, and dry mouth · Sleepiness and dizziness · Confusion · Depression · Low levels of testosterone that can result in lower sex drive, energy, and strength · Itching and sweating RISKS ARE GREATER WITH:      
· History of drug misuse, substance use disorder, or overdose · Mental health conditions (such as depression or anxiety) · Sleep apnea · Older age (72 years or older) · Pregnancy Avoid alcohol while taking prescription opioids. Also, unless specifically advised by your health care provider, medications to avoid include: · Benzodiazepines (such as Xanax or Valium) · Muscle relaxants (such as Soma or Flexeril) · Hypnotics (such as Ambien or Lunesta) · Other prescription opioids KNOW YOUR OPTIONS Talk to your health care provider about ways to manage your pain that don't involve prescription opioids. Some of these options may actually work better and have fewer risks and side effects. Options may include: 
· Pain relievers such as acetaminophen, ibuprofen, and naproxen · Some medications that are also used for depression or seizures · Physical therapy and exercise · Counseling to help patients learn how to cope better with triggers of pain and stress. · Application of heat or cold compress · Massage therapy · Relaxation techniques Be Informed Make sure you know the name of your medication, how much and how often to take it, and its potential risks & side effects. IF YOU ARE PRESCRIBED OPIOIDS FOR PAIN: 
· Never take opioids in greater amounts or more often than prescribed. Remember the goal is not to be pain-free but to manage your pain at a tolerable level. · Follow up with your primary care provider to: · Work together to create a plan on how to manage your pain. · Talk about ways to help manage your pain that don't involve prescription opioids. · Talk about any and all concerns and side effects. · Help prevent misuse and abuse. · Never sell or share prescription opioids · Help prevent misuse and abuse. · Store prescription opioids in a secure place and out of reach of others (this may include visitors, children, friends, and family). · Safely dispose of unused/unwanted prescription opioids: Find your community drug take-back program or your pharmacy mail-back program, or flush them down the toilet, following guidance from the Food and Drug Administration (www.fda.gov/Drugs/ResourcesForYou). · Visit www.cdc.gov/drugoverdose to learn about the risks of opioid abuse and overdose. · If you believe you may be struggling with addiction, tell your health care provider and ask for guidance or call [address] [place], [place] [address] at 5-258-693-IJLW. Discharge Instructions [facility] Surgery: Total Knee Replacement Surgeon:   Ant Presley MD 
Surgery Date:  8/21/2018 ? To relieve pain: ? Use ice/gel packs. ? Put the ice pack directly over the wound, or anywhere you are hurting or swollen. ? To control pain and swelling, keep ice on regularly, especially after physical activity. ? The packs should stay cold for 3-4 hours. When it is not cold anymore, rotate with the packs in the freezer. ? Elevate your leg. This will also keep swelling down. ? Rest for at least 20 minutes between activity or exercises. ? To keep track of your pain medications, write down what you take and when you take it. ? The last dose of pain medication you got in the hospital was:    
 
Medication Dose Date & Time ? Choose your medications based on the pain scale below: ? To keep your pain under control, take Tylenol every 6 hours for 14 days - even if you feel like you dont need it. ? For mild to moderate pain (4-6 on pain scale), take one pain pill every 3-4 hours as needed. ? For severe pain (7-10 on pain scale), take two pain pills every 3-4 hours as needed. ? To prevent nausea, take your pain medications with food. Pain Scale ? As your pain lessens: 
 
? Slowly start taking less pain medication. You may do this by waiting longer between doses or by taking smaller doses. ? Stop using the pain medications as soon as you no longer need it, usually in 2-3 weeks. ? Aspirin ? To prevent blood clots, you will need to take Aspirin 325  mg twice a day for 30 days. ? To prevent stomach upset or bleeding: ? Do not take non-steroidal anti-inflammatory medications (Ibuprofen, Advil, Motrin, Naproxen, etc.) ? Take Pepcid 20 mg twice a day, or a similar home medication, while you are taking a blood thinner. OPSITE (Honeycomb dressing) ? Keep your clear, waterproof dressing in place for 5-7 days after your leave the hospital. 
 
? If you are still having drainage, you will need to change your dressing in 5-7 days. You will be given one extra dressing to use at home. ? If there is no more drainage from the wound, you may leave it open to the air. OPSITE DRESSING INSTRUCTIONS ? DO NOTs: 
? Do not rub your surgical wound ? Do not put lotion or oils on your wound. ? Do not take a tub bath or go swimming until your doctor says it is ok. 
 
? You may shower with this dressing over your wound. ? After showering pat the dressing dry. ? If you have staples a home health nurse will remove them in about 10 days. ? To increase and promote healing: 
? Stop Smoking (or at least cut back on 
     Smoking). ? Eat a well-balanced diet (high in protein 
     and vitamin C). ? If you have a poor appetite, drink Ensure, Glucerna, or  
      Morning Sun Instant Breakfast for the next 30 days. ? If you are diabetic, you should control your blood  
      sugars to prevent infection and help your wound  
      to heal. 
               
 
 
 
? To prevent constipation, stay active and drink plenty of fluid. ? While using pain medications, you should also take stool softeners and laxatives, such as Pericolace 
     and Miralax. ? If you are having too many bowel Movements, then you may need 
     to stop taking the laxatives. ? You should have a bowel movement 3-4 days  
     after surgery and then at least every other day while 
     on pain medication. ? To improve your recovery, you must be active! ? Use your walker and take short walks (in your home)  
      about every 2 hours during the day. ? Try to increase how far you walk each day. ? You can put as much weight on your leg as you can tolerate while walking. ? To avoid getting a stiff knee, work on getting your knee bent and straight as soon as possible. ? Home health physical therapy will come to your 
     home the day after you leave the hospital.  The 
     therapist will visit about 4 times over the next  
     couple of weeks to teach you how to get out of  
     bed, to safely walk in your home, and to do your  
     exercises. ? NO DRIVING until your surgeon tells you it is ok. ? You can return to work when cleared by a physician. ? Please call your physician immediately if you have: 
 
? Constant bleeding from your wound. ? Increasing redness or swelling around your wound (Some warmth, bruising and swelling is normal). ? Change in wound drainage (increase in amount, color, or bad smell). ? Change in mental status (unusual behavior ? Temperature over 101.5 degrees Fahrenheit ? Pain or redness in the calf (back of your lower leg  see picture) ? Increased swelling of the thigh, ankle, calf, or foot. ? Emergency: CALL 911 if you have: 
 
? Shortness of breath ? Chest pain when you cough or taking a deep breath ? Please call your surgeons office at [phone]  for a follow up appointment. _ 
? You should call as soon as you get home or the next day after discharge. Ask to make an appointment in 2 weeks. ? If you have questions or concerns during normal business hours, you may reach Dr. Estrada' Team at [phone]. knowNormal Announcement We are excited to announce that we are making your provider's discharge notes available to you in knowNormal. You will see these notes when they are completed and signed by the physician that discharged you from your recent hospital stay. If you have any questions or concerns about any information you see in knowNormal, please call the Health Information Department where you were seen or reach out to your Primary Care Provider for more information about your plan of care. Introducing [facility]! [facility] introduces knowNormal patient portal. Now you can access parts of your medical record, email your doctor's office, and request medication refills online. 1. In your internet browser, go to [email or web address] 2. Click on the First Time User? Click Here link in the Sign In box. You will see the New Member Sign Up page. 3. Enter your BeckonCall Access Code exactly as it appears below. You will not need to use this code after youve completed the sign-up process. If you do not sign up before the expiration date, you must request a new code. · BeckonCall Access Code: [email or web address] Expires: 10/31/2018  3:17 PM 
 
4. Enter the last four digits of your Social Security Number (xxxx) and Date of Birth (mm/dd/yyyy) as indicated and click Submit. You will be taken to the next sign-up page. 5. Create a IKO Systemt ID. This will be your BeckonCall login ID and cannot be changed, so think of one that is secure and easy to remember. 6. Create a BeckonCall password. You can change your password at any time. 7. Enter your Password Reset Question and Answer. This can be used at a later time if you forget your password. 8. Enter your e-mail address. You will receive e-mail notification when new information is available in [address] 9. Click Sign Up. You can now view and download portions of your medical record. 10. Click the Download Summary menu link to download a portable copy of your medical information. If you have questions, please visit the Frequently Asked Questions section of the BeckonCall website. Remember, BeckonCall is NOT to be used for urgent needs. For medical emergencies, dial 911. Now available from your iPhone and Android! Introducing Robert Pope As a Cira Muñoz patient, I wanted to make you aware of our electronic visit tool called Robert Manueljoannsancho. [facility] 24/7 allows you to connect within minutes with a medical provider 24 hours a day, seven days a week via a mobile device or tablet or logging into a secure website from your computer. You can access Robert Pope from anywhere in the [place].  
 
A virtual visit might be right for you when you have a simple condition and feel like you just dont want to get out of bed, or cant get away from work for an appointment, when your regular [employer] provider is not available (evenings, weekends or holidays), or when youre out of town and need minor care. Electronic visits cost only $49 and if the [employer] 24/7 provider determines a prescription is needed to treat your condition, one can be electronically transmitted to a nearby pharmacy*. Please take a moment to enroll today if you have not already done so. The enrollment process is free and takes just a few minutes. To enroll, please download the [facility] 24/7 elvia to your tablet or phone, or visit [email or web address] to enroll on your computer. And, as an [address] [place] [address] patient with a Fan TV account, the results of your visits will be scanned into your electronic medical record and your primary care provider will be able to view the scanned results. We urge you to continue to see your regular Saralee Jessica provider for your ongoing medical care. And while your primary care provider may not be the one available when you seek a [employer]ann[employer] virtual visit, the peace of mind you get from getting a real diagnosis real time can be priceless. For more information on Novawise, view our Frequently Asked Questions (FAQs) at [email or web address]. Sincerely, 
 
Siri Condon MD 
Chief Medical Officer [address] *:  certain medications cannot be prescribed via Novawise Providers Seen During Your Hospitalization Provider Specialty Primary office phone Darline Felix MD Orthopedic Surgery [phone] Your Primary Care Physician (PCP) Primary Care Physician Office Phone Office Fax Sydnee March, [place] [phone] You are allergic to the following Allergen Reactions Codeine Other (comments)  
 fainting Recent Documentation Height Weight BMI OB Status Smoking Status 1.575 m 65.8 kg 26.53 kg/m2 Hysterectomy Never Smoker Emergency Contacts Name Discharge Info Relation Home Work Mobile Anny Mckeon DISCHARGE CAREGIVER [3] Daughter [21] [phone] Dania Caballero  Child [2] [phone] Patient Belongings The following personal items are in your possession at time of discharge: 
  Dental Appliances: None  Visual Aid: None      Home Medications: None   Jewelry: None  Clothing: Undergarments, Shirt, Other (comment), Footwear (scarf,skirt)    Other Valuables: Cane  Personal Items Sent to Safe: declined Please provide this summary of care documentation to your next provider. Signatures-by signing, you are acknowledging that this After Visit Summary has been reviewed with you and you have received a copy. Patient Signature:  ____________________________________________________________ Date:  ____________________________________________________________  
  
Orion Kim Provider Signature:  ____________________________________________________________ Date:  ____________________________________________________________

## 2018-08-21 NOTE — ANESTHESIA POSTPROCEDURE EVALUATION
Post-Anesthesia Evaluation and Assessment    Patient: Nancy Fernandez MRN: [id number]  SSN: [id number]    YOB: 1932  Age: 80 y.o. Sex: female       Cardiovascular Function/Vital Signs  Visit Vitals    /44    Pulse 75    Temp 36.5 °C (97.7 °F)    Resp 17    Ht 5' 2\" (1.575 m)    Wt 65.8 kg (145 lb 1 oz)    SpO2 94%    BMI 26.53 kg/m2       Patient is status post general anesthesia for Procedure(s):  LEFT TOTAL KNEE ARTHROPLASTY WITH NAVIGATION . Nausea/Vomiting: None    Postoperative hydration reviewed and adequate. Pain:  Pain Scale 1: Numeric (0 - 10) (08/21/18 1345)  Pain Intensity 1: 0 (08/21/18 1345)   Managed    Neurological Status:   Neuro (WDL): Exceptions to WDL (08/21/18 1310)  Neuro  Neurologic State: Drowsy; Eyes open to voice (08/21/18 1310)  Orientation Level: Oriented to person;Oriented to place;Oriented to situation (08/21/18 1310)  Cognition: Appropriate for age attention/concentration; Follows commands (08/21/18 1310)  Speech: Delayed responses; Appropriate for age (08/21/18 1310)  LUE Motor Response: Purposeful (08/21/18 1310)  LLE Motor Response: Purposeful (08/21/18 1310)  RUE Motor Response: Purposeful (08/21/18 1310)  RLE Motor Response: Purposeful (08/21/18 1310)   At baseline    Mental Status and Level of Consciousness: Arousable    Pulmonary Status:   O2 Device: Room air (08/21/18 1345)   Adequate oxygenation and airway patent    Complications related to anesthesia: None    Post-anesthesia assessment completed.  No concerns    Signed By: Lamont Ramos MD     August 21, 2018

## 2018-08-21 NOTE — PROGRESS NOTES
TRANSFER - IN REPORT:    Verbal report received from [address], RN(name) on Elin  being received from triptap) for routine post - op      Report consisted of patients Situation, Background, Assessment and   Recommendations(SBAR). Information from the following report(s) SBAR, Kardex, OR Summary, Procedure Summary, Intake/Output, MAR and Cardiac Rhythm NSR was reviewed with the receiving nurse. Opportunity for questions and clarification was provided. Assessment completed upon patients arrival to unit and care assumed.

## 2018-08-22 ENCOUNTER — HOME HEALTH ADMISSION (OUTPATIENT)
Dept: HOME HEALTH SERVICES | Facility: HOME HEALTH | Age: 83
End: 2018-08-22
Payer: MEDICARE

## 2018-08-22 VITALS
RESPIRATION RATE: 16 BRPM | DIASTOLIC BLOOD PRESSURE: 51 MMHG | BODY MASS INDEX: 26.69 KG/M2 | TEMPERATURE: 96.2 F | WEIGHT: 145.06 LBS | HEIGHT: 62 IN | SYSTOLIC BLOOD PRESSURE: 125 MMHG | OXYGEN SATURATION: 98 % | HEART RATE: 60 BPM

## 2018-08-22 LAB
ANION GAP SERPL CALC-SCNC: 6 MMOL/L (ref 5–15)
BUN SERPL-MCNC: 13 MG/DL (ref 6–20)
BUN/CREAT SERPL: 18 (ref 12–20)
CALCIUM SERPL-MCNC: 8.9 MG/DL (ref 8.5–10.1)
CHLORIDE SERPL-SCNC: 104 MMOL/L (ref 97–108)
CO2 SERPL-SCNC: 27 MMOL/L (ref 21–32)
CREAT SERPL-MCNC: 0.72 MG/DL (ref 0.55–1.02)
GLUCOSE BLD STRIP.AUTO-MCNC: 170 MG/DL (ref 65–100)
GLUCOSE BLD STRIP.AUTO-MCNC: 182 MG/DL (ref 65–100)
GLUCOSE SERPL-MCNC: 141 MG/DL (ref 65–100)
HGB BLD-MCNC: 12 G/DL (ref 11.5–16)
POTASSIUM SERPL-SCNC: 3.7 MMOL/L (ref 3.5–5.1)
SERVICE CMNT-IMP: ABNORMAL
SERVICE CMNT-IMP: ABNORMAL
SODIUM SERPL-SCNC: 137 MMOL/L (ref 136–145)

## 2018-08-22 PROCEDURE — 74011250637 HC RX REV CODE- 250/637: Performed by: PHYSICIAN ASSISTANT

## 2018-08-22 PROCEDURE — 85018 HEMOGLOBIN: CPT | Performed by: PHYSICIAN ASSISTANT

## 2018-08-22 PROCEDURE — 82962 GLUCOSE BLOOD TEST: CPT

## 2018-08-22 PROCEDURE — 80048 BASIC METABOLIC PNL TOTAL CA: CPT | Performed by: PHYSICIAN ASSISTANT

## 2018-08-22 PROCEDURE — 74011000250 HC RX REV CODE- 250: Performed by: PHYSICIAN ASSISTANT

## 2018-08-22 PROCEDURE — 36415 COLL VENOUS BLD VENIPUNCTURE: CPT | Performed by: PHYSICIAN ASSISTANT

## 2018-08-22 PROCEDURE — 97530 THERAPEUTIC ACTIVITIES: CPT

## 2018-08-22 PROCEDURE — 74011636637 HC RX REV CODE- 636/637: Performed by: PHYSICIAN ASSISTANT

## 2018-08-22 PROCEDURE — 74011250636 HC RX REV CODE- 250/636: Performed by: PHYSICIAN ASSISTANT

## 2018-08-22 RX ORDER — ASPIRIN 325 MG
325 TABLET, DELAYED RELEASE (ENTERIC COATED) ORAL 2 TIMES DAILY
Qty: 60 TAB | Refills: 0 | Status: SHIPPED | OUTPATIENT
Start: 2018-08-22 | End: 2018-09-21

## 2018-08-22 RX ORDER — AMOXICILLIN 250 MG
1 CAPSULE ORAL DAILY
Qty: 30 TAB | Refills: 0 | Status: SHIPPED | OUTPATIENT
Start: 2018-08-22 | End: 2019-05-22

## 2018-08-22 RX ORDER — POLYETHYLENE GLYCOL 3350 17 G/17G
17 POWDER, FOR SOLUTION ORAL
Qty: 15 PACKET | Refills: 0 | Status: SHIPPED | OUTPATIENT
Start: 2018-08-22 | End: 2018-09-06

## 2018-08-22 RX ORDER — OXYCODONE HYDROCHLORIDE 5 MG/1
5-10 TABLET ORAL
Qty: 80 TAB | Refills: 0 | Status: SHIPPED | OUTPATIENT
Start: 2018-08-22 | End: 2019-05-22

## 2018-08-22 RX ORDER — ACETAMINOPHEN 325 MG/1
650 TABLET ORAL EVERY 6 HOURS
Qty: 112 TAB | Refills: 0 | Status: SHIPPED | OUTPATIENT
Start: 2018-08-22 | End: 2018-09-05

## 2018-08-22 RX ADMIN — GABAPENTIN 100 MG: 100 CAPSULE ORAL at 08:45

## 2018-08-22 RX ADMIN — ASPIRIN 325 MG: 325 TABLET, DELAYED RELEASE ORAL at 08:43

## 2018-08-22 RX ADMIN — OXYCODONE HYDROCHLORIDE 5 MG: 5 TABLET ORAL at 08:54

## 2018-08-22 RX ADMIN — INSULIN LISPRO 2 UNITS: 100 INJECTION, SOLUTION INTRAVENOUS; SUBCUTANEOUS at 11:45

## 2018-08-22 RX ADMIN — POLYETHYLENE GLYCOL 3350 17 G: 17 POWDER, FOR SOLUTION ORAL at 08:42

## 2018-08-22 RX ADMIN — ACETAMINOPHEN 650 MG: 325 TABLET ORAL at 11:48

## 2018-08-22 RX ADMIN — SENNOSIDES AND DOCUSATE SODIUM 1 TABLET: 8.6; 5 TABLET ORAL at 08:45

## 2018-08-22 RX ADMIN — CITALOPRAM HYDROBROMIDE 20 MG: 20 TABLET ORAL at 08:44

## 2018-08-22 RX ADMIN — PANTOPRAZOLE SODIUM 40 MG: 40 TABLET, DELAYED RELEASE ORAL at 08:43

## 2018-08-22 RX ADMIN — Medication 2 G: at 03:44

## 2018-08-22 RX ADMIN — Medication 10 ML: at 06:02

## 2018-08-22 RX ADMIN — INSULIN LISPRO 2 UNITS: 100 INJECTION, SOLUTION INTRAVENOUS; SUBCUTANEOUS at 08:40

## 2018-08-22 RX ADMIN — ATORVASTATIN CALCIUM 20 MG: 20 TABLET, FILM COATED ORAL at 08:45

## 2018-08-22 NOTE — DISCHARGE SUMMARY
Ortho Discharge Summary    Patient ID:  Cora Brown  [id number]  female  80 y.o.  9/22/1932    Admit date: 8/21/2018    Discharge date: 8/22/2018    Admitting Physician: Cipriano Jackson MD     Consulting Physician(s):   Treatment Team: Attending Provider: Cipriano Jackson MD; Nurse Practitioner: Erica Valiente NP; Nurse Practitioner: Santana Ward NP; Utilization Review: Aureliano Morejon    Date of Surgery:   8/21/2018     Preoperative Diagnosis:  LEFT KNEE OSTEOARTHRITIS    Postoperative Diagnosis:   LEFT KNEE OSTEOARTHRITIS    Procedure(s):     LEFT TOTAL KNEE ARTHROPLASTY WITH NAVIGATION      Anesthesia Type:   General     Surgeon: Cipriano Jackson MD                            HPI:  Pt is a 80 y.o. female who has a history of LEFT KNEE OSTEOARTHRITIS  with pain and limitations of activities of daily living who presents at this time for a left TKA following the failure of conservative management. PMH:   Past Medical History:   Diagnosis Date    Arthritis     Chronic pain     knee    Diabetes ([address]     GERD (gastroesophageal reflux disease)     Hypertension     Ill-defined condition     seaonal allergies    Psychiatric disorder     anxiety       Body mass index is 26.53 kg/(m^2). : A BMI > 30 is classified as obesity and > 40 is classified as morbid obesity. Medications upon admission :   Prior to Admission Medications   Prescriptions Last Dose Informant Patient Reported? Taking?   atorvastatin (LIPITOR) 20 mg tablet 8/21/2018 at 0600  Yes Yes   Sig: Take 20 mg by mouth daily. cetirizine (ZYRTEC) 10 mg tablet 8/21/2018 at 0600  Yes Yes   Sig: Take  by mouth as needed for Allergies. cholecalciferol, vitamin D3, (VITAMIN D3 PO) 8/14/2018 at Unknown time  Yes Yes   Sig: Take  by mouth daily. citalopram (CELEXA) 20 mg tablet 8/21/2018 at 0600  Yes Yes   Sig: Take 20 mg by mouth daily. cyanocobalamin, vitamin B-12, (VITAMIN B-12 PO) 8/14/2018 at Unknown time  Yes Yes   Sig: Take  by mouth. diclofenac (VOLTAREN) 1 % gel 8/20/2018 at 0800  No Yes   Sig: Apply 2 g to affected area four (4) times daily as needed. Apply to feet.   gabapentin (NEURONTIN) 100 mg capsule 8/21/2018 at 0600  No Yes   Sig: TAKE 1 CAPSULE BY MOUTH THREE TIMES DAILY   hydrochlorothiazide (HYDRODIURIL) 25 mg tablet Not Taking at Unknown time  Yes No   Sig: Take 1 Tab by mouth daily. Restart when Blood Pressure is greater than 120/80   metFORMIN ER (GLUCOPHAGE XR) 500 mg tablet 8/20/2018 at 1800  Yes Yes   Sig: TK 1 T PO ONCE QD WITH DINNER   omeprazole (PRILOSEC) 20 mg capsule 8/21/2018 at 0600  Yes Yes   Sig: Take 20 mg by mouth daily. Facility-Administered Medications: None        Allergies: Allergies   Allergen Reactions    Codeine Other (comments)     fainting        Hospital Course: The patient underwent surgery. Complications:  None; patient tolerated the procedure well. Was taken to the PACU in stable condition and then transferred to the ortho floor. Perioperative Antibiotics:  Ancef     Postoperative Pain Management:  Oxycodone      DVT Prophylaxis: Aspirin 325    Postoperative transfusions:    Number of units banked PRBCs =   none     Post Op complications: none    Hemoglobin at discharge:    Lab Results   Component Value Date/Time    HGB 12.0 08/22/2018 04:07 AM    INR 1.1 08/07/2018 01:36 PM       Dressing was changed on POD # 1. Incision - clean, dry and intact. No significant erythema or swelling. Neurovascular exam found to be within normal limits. Wound appears to be healing without any evidence of infection. Physical Therapy started on the day following surgery and participated in bed mobility, transfers and ambulation.         Gait:  Gait  Speed/Yissel: Pace decreased (<100 feet/min), Slow  Step Length: Right shortened, Left shortened (R>L)  Gait Abnormalities: Antalgic, Step to gait  Ambulation - Level of Assistance: Contact guard assistance  Distance (ft): 10 Feet (ft)  Assistive Device: Walker, rolling, Gait belt                   Discharged to: Home. Condition on Discharge:   stable    Discharge instructions:  - Anticoagulate with Aspirin 325 BID  - Take pain medications as prescribed  - Resume pre hospital diet      - Discharge activity: activity as tolerated  - Ambulate with assistive device as needed. - Weight bearing status WBAT  - Wound Care Keep wound clean and dry. See discharge instruction sheet. -DISCHARGE MEDICATION LIST     Current Discharge Medication List      START taking these medications    Details   acetaminophen (TYLENOL) 325 mg tablet Take 2 Tabs by mouth every six (6) hours for 14 days. Qty: 112 Tab, Refills: 0      aspirin delayed-release 325 mg tablet Take 1 Tab by mouth two (2) times a day for 30 days. Qty: 60 Tab, Refills: 0      oxyCODONE IR (ROXICODONE) 5 mg immediate release tablet Take 1-2 Tabs by mouth every four (4) hours as needed. Max Daily Amount: 60 mg. If insurance prior authorization or quantity restrictions apply, refer to and look up diagnosis code one prescription. Partial fill as needed is permitted. Please do not contact prescriber. Qty: 80 Tab, Refills: 0    Associated Diagnoses: Status post total left knee replacement      senna-docusate (PERICOLACE) 8.6-50 mg per tablet Take 1 Tab by mouth daily. Qty: 30 Tab, Refills: 0      polyethylene glycol (MIRALAX) 17 gram packet Take 1 Packet by mouth daily as needed (constipation) for up to 15 days. Qty: 15 Packet, Refills: 0         CONTINUE these medications which have NOT CHANGED    Details   gabapentin (NEURONTIN) 100 mg capsule TAKE 1 CAPSULE BY MOUTH THREE TIMES DAILY  Qty: 90 Cap, Refills: 2    Associated Diagnoses: Idiopathic small and large fiber sensory neuropathy; Diabetic peripheral neuropathy associated with type 2 diabetes mellitus ([facility] [address]  Vitamin D deficiency; B12 deficiency; Lumbar back pain with radiculopathy affecting right lower extremity; Lumbar back pain with radiculopathy affecting left lower extremity; Cervical radiculopathy due to osteoarthritis of spine; Bilateral carpal tunnel syndrome      cholecalciferol, vitamin D3, (VITAMIN D3 PO) Take  by mouth daily. cyanocobalamin, vitamin B-12, (VITAMIN B-12 PO) Take  by mouth. cetirizine (ZYRTEC) 10 mg tablet Take  by mouth as needed for Allergies. diclofenac (VOLTAREN) 1 % gel Apply 2 g to affected area four (4) times daily as needed. Apply to feet. Qty: 100 g, Refills: 5    Associated Diagnoses: Primary osteoarthritis of left foot      metFORMIN ER (GLUCOPHAGE XR) 500 mg tablet TK 1 T PO ONCE QD WITH DINNER  Refills: 0    Associated Diagnoses: Idiopathic small and large fiber sensory neuropathy; Diabetic peripheral neuropathy associated with type 2 diabetes mellitus ([facility] [address] Vitamin D deficiency; B12 deficiency; Lumbar back pain with radiculopathy affecting right lower extremity; Lumbar back pain with radiculopathy affecting left lower extremity; Cervical radiculopathy due to osteoarthritis of spine; Bilateral carpal tunnel syndrome      omeprazole (PRILOSEC) 20 mg capsule Take 20 mg by mouth daily. citalopram (CELEXA) 20 mg tablet Take 20 mg by mouth daily. atorvastatin (LIPITOR) 20 mg tablet Take 20 mg by mouth daily. hydrochlorothiazide (HYDRODIURIL) 25 mg tablet Take 1 Tab by mouth daily. Restart when Blood Pressure is greater than 120/80          per medical continuation form      -Follow up in office in 2 weeks      Signed:  Miryam Fajardo.  Marny Brunner, CONSTANTINOP-BC, ONP-C  Orthopaedic Nurse Practitioner    8/22/2018  9:36 AM

## 2018-08-22 NOTE — PROGRESS NOTES
Problem: Mobility Impaired (Adult and Pediatric)  Goal: *Acute Goals and Plan of Care (Insert Text)  Physical Therapy Goals  Initiated 8/21/2018    1. Patient will move from supine to sit and sit to supine , scoot up and down and roll side to side in bed with modified independence within 4 days. 2. Patient will perform sit to stand with modified independence within 4 days. 3. Patient will ambulate with modified independence for 200 feet with the least restrictive device within 4 days. 4. Patient will ascend/descend 4 stairs with 1 handrail(s) with modified independence within 4 days. 5. Patient will perform home exercise program per protocol with independence within 4 days. 6. Patient will demonstrate AROM 0-90 degrees in operative joint within 4 days. physical Therapy TREATMENT  Patient: Kallie Powell (43 y.o. female)  Date: 8/22/2018  Diagnosis: LEFT KNEE OSTEOARTHRITIS  'light-for-dates' infant with signs of fetal malnutrition Status post total left knee replacement  Procedure(s) (LRB):  LEFT TOTAL KNEE ARTHROPLASTY WITH NAVIGATION  (Left) 1 Day Post-Op  Precautions: WBAT  Chart, physical therapy assessment, plan of care and goals were reviewed. ASSESSMENT:  Pt cleared by nurse caitlin roman. Pt received in bed supine. Pt agreeable to therapy. Pt performed uspine to sit at supervision. Pt performed sit to stand transfer at [facility] with cueing for sequencing. Pt ambulated 400ft with RW at [facility]. Pt requiring cueing for posture and heel toe sequencing to imporve ROM. Pt ascended and descended 4 steps with both rails at [facility] with instruction for sequencing. Pt performed a car transfer at [place] [id number] with instruction for sequencing. Pt independent with exercises. Pt moving well with no safety concerns. Pts BP stable throughout session . Pt left up in chair with all needs met. From physical therapy stand point pt cleared for discharge. Pt will benefit from HHPT to improve strength and ROM.     Progression toward goals:  [x]      Improving appropriately and progressing toward goals  []      Improving slowly and progressing toward goals  []      Not making progress toward goals and plan of care will be adjusted     PLAN:  Patient continues to benefit from skilled intervention to address the above impairments. Continue treatment per established plan of care. Discharge Recommendations:  Home Health  Further Equipment Recommendations for Discharge:  none     SUBJECTIVE:   Patient stated Do I have to sit up in the chair.     OBJECTIVE DATA SUMMARY:   Critical Behavior:  Neurologic State: Alert  Orientation Level: Appropriate for age, Oriented X4  Cognition: Appropriate decision making, Appropriate for age attention/concentration     Range of Motion:   AROM:Generally decreased, Functional                        Functional Mobility Training:  Bed Mobility:  Rolling: Supervision  Supine to Sit: Supervision     Scooting: Supervision        Transfers:  Sit to Stand: Contact guard assistance  Stand to Sit: Contact guard assistance                             Balance:  Sitting: Intact  Standing: Intact; With support  Standing - Static: Good;Constant support  Standing - Dynamic : Good  Ambulation/Gait Training:  Distance (ft): 400 Feet (ft)  Assistive Device: Gait belt;Walker, rolling  Ambulation - Level of Assistance: Contact guard assistance;Stand-by assistance        Gait Abnormalities: Antalgic;Decreased step clearance        Base of Support: Narrowed     Speed/Yissel: Pace decreased (<100 feet/min)  Step Length: Left shortened;Right shortened     Stairs:  Number of Stairs Trained: 4  Stairs - Level of Assistance: Contact guard assistance  Rail Use: Left   Therapeutic Exercises:     EXERCISE   Sets   Reps   Active Active Assist   Passive Self ROM   Comments   Ankle Pumps 1 10 [x]                                        []                                        []                                        [] Quad Sets 1 5 [x]                                        []                                        []                                        []                                           Hamstring Sets   []                                        []                                        []                                        []                                           Short Arc Quads   []                                        []                                        []                                        []                                           Knee Extension Stretch     []                                          []                                          []                                          []                                           Heel Slides   []                                        []                                        []                                        []                                           Long Arc Quads 1 5 [x]                                        []                                        []                                        []                                           Knee Flexion Stretch   []                                        []                                        []                                        []                                           Straight Leg Raises   []                                        []                                        []                                        []                                             Pain:  Pain Scale 1: Numeric (0 - 10)  Pain Intensity 1: 3  Pain Location 1: Knee     Pain Description 1: Aching  Pain Intervention(s) 1: Medication (see MAR)  Activity Tolerance:   Pt moving well with no safety concerns.      After treatment:   [x] Patient left in no apparent distress sitting up in chair  [] Patient left in no apparent distress in bed  [x] Call bell left within reach  [x] Nursing notified  [] Caregiver present  [] Bed alarm activated    COMMUNICATION/COLLABORATION:   The patients plan of care was discussed with: Registered Nurse    Nile Sewell   Time Calculation: 15 mins

## 2018-08-22 NOTE — PHYSICIAN ADVISORY
Short Stay Review       Pt Name:  Kallie Powell   MR#  [id number]   CSN#   [id number]   [address] [place] [address]  @ [facility]   Hospitalization date  8/21/2018  8:27 AM  No discharge date for patient encounter. Current Attending Physician  Jhon Corona MD     A discharge order has been placed for this episode of hospital care for Ms. Kallie Powell; since this hospital stay is less than two midnights, I reviewed Ms. Rox Jones chart. Ms. Rox Jones healthcare insurance/benefit include:  Payor: [place] MEDICARE / Plan: VA MEDICARE PART A & B / Product Type: Medicare /     Utilization Review related case summary:   Age  80 y.o.   BMI  Body mass index is 26.53 kg/(m^2). Functional status ASA Class  2   PMHx includes  HTN, OA, DM2, back pain GERD    EKG  Sinus Bradycardia    Echo  N/a    Hospital course  Uncomplicated    PT OT evaluation  HH    Other Social/logistical issues  None    Risk of deterioration at the time this patient was hospitalized     Moderate            On the basis of chart review, this patient's hospitalization status         Due to moratorium on the TKA cases declared by CMS we will leave this pt's status as certified INPT. Ideally this pt would be appropriate for OBS status.            Delene Dakins MD MPH FACP   Physician Advisor    [address]   Utilization Review, Care Management         CSN:  [id number]   MARIANO:   [id number]  Admitted on :  8/21/2018   Discharge order

## 2018-08-22 NOTE — PROGRESS NOTES
Reviewed discharge instructions, prescriptions, and side effects with patient and her son. Reviewed wound care, follow-up instructions, and medication instructions. Answered all questions and provided contact information for future questions. Took IV out per policy, Catheter tip intact. Going home in a car with home health.

## 2018-08-22 NOTE — PROGRESS NOTES
Bedside and Verbal shift change report given to Miri Perez (oncoming nurse) by Dot Musa (offgoing nurse). Report included the following information SBAR, Kardex, MAR and Recent Results.

## 2018-08-22 NOTE — PROGRESS NOTES
Ortho / Neurosurgery NP Note    POD# 1  s/p LEFT TOTAL KNEE ARTHROPLASTY WITH NAVIGATION      Pt resting in bed. No complaints. VSS Afebrile. Voiding status: + void    Labs  Lab Results   Component Value Date/Time    HGB 12.0 08/22/2018 04:07 AM      Lab Results   Component Value Date/Time    INR 1.1 08/07/2018 01:36 PM        Body mass index is 26.53 kg/(m^2). : A BMI > 30 is classified as obesity and > 40 is classified as morbid obesity. Dressing c.d.i  Cryotherapy in place over incision  Calves soft and supple;  No pain with passive stretch  Sensation and motor intact  SCDs for mechanical DVT proph while in bed     PLAN:  1) PT BID  2) Aspirin 325 mg PO BID for DVT Prophylaxis   3) GI Prophylaxis - Protonix  4) Readniess for discharge:     [x] Vital Signs stable    [x] Hgb stable    [x] + Voiding    [x] Wound intact, drainage minimal    [x] Tolerating PO intake     [] Cleared by PT (OT if applicable)     [] Stair training completed (if applicable)    [] Independent / [facility] (household distance)     [] Bed mobility     [] Car transfers     [] ADLs    [x] Adequate pain control on oral medication alone     Home with daughter and son if clear PT today    Loulou Lundy, LANI  DNP, ACNP-BC, ONP-C

## 2018-08-23 ENCOUNTER — HOME CARE VISIT (OUTPATIENT)
Dept: SCHEDULING | Facility: HOME HEALTH | Age: 83
End: 2018-08-23
Payer: MEDICARE

## 2018-08-23 PROCEDURE — G0151 HHCP-SERV OF PT,EA 15 MIN: HCPCS

## 2018-08-23 PROCEDURE — 3331090001 HH PPS REVENUE CREDIT

## 2018-08-23 PROCEDURE — 3331090002 HH PPS REVENUE DEBIT

## 2018-08-23 PROCEDURE — 400013 HH SOC

## 2018-08-24 VITALS
DIASTOLIC BLOOD PRESSURE: 60 MMHG | SYSTOLIC BLOOD PRESSURE: 116 MMHG | TEMPERATURE: 97 F | HEART RATE: 69 BPM | OXYGEN SATURATION: 96 %

## 2018-08-24 PROCEDURE — 3331090001 HH PPS REVENUE CREDIT

## 2018-08-24 PROCEDURE — 3331090002 HH PPS REVENUE DEBIT

## 2018-08-25 PROCEDURE — 3331090001 HH PPS REVENUE CREDIT

## 2018-08-25 PROCEDURE — 3331090002 HH PPS REVENUE DEBIT

## 2018-08-26 PROCEDURE — 3331090002 HH PPS REVENUE DEBIT

## 2018-08-26 PROCEDURE — 3331090001 HH PPS REVENUE CREDIT

## 2018-08-27 ENCOUNTER — HOME CARE VISIT (OUTPATIENT)
Dept: SCHEDULING | Facility: HOME HEALTH | Age: 83
End: 2018-08-27
Payer: MEDICARE

## 2018-08-27 PROCEDURE — G0151 HHCP-SERV OF PT,EA 15 MIN: HCPCS

## 2018-08-27 PROCEDURE — 3331090001 HH PPS REVENUE CREDIT

## 2018-08-27 PROCEDURE — 3331090002 HH PPS REVENUE DEBIT

## 2018-08-28 VITALS
OXYGEN SATURATION: 96 % | SYSTOLIC BLOOD PRESSURE: 120 MMHG | HEART RATE: 71 BPM | DIASTOLIC BLOOD PRESSURE: 70 MMHG | TEMPERATURE: 97.3 F

## 2018-08-28 PROCEDURE — 3331090002 HH PPS REVENUE DEBIT

## 2018-08-28 PROCEDURE — 3331090001 HH PPS REVENUE CREDIT

## 2018-08-29 PROCEDURE — 3331090002 HH PPS REVENUE DEBIT

## 2018-08-29 PROCEDURE — 3331090001 HH PPS REVENUE CREDIT

## 2018-08-29 NOTE — OP NOTES
[address] REPORT    [facility]  MR#: [id number]  : 1931  ACCOUNT #: [de-identified]   DATE OF SERVICE: 2018    PREOPERATIVE DIAGNOSIS:  Left knee severe end-stage osteoarthritis. POSTOPERATIVE DIAGNOSIS:  Left knee severe end-stage osteoarthritis. PROCEDURES PERFORMED:  Left total knee arthroplasty with navigation. SURGEON:  Paulina Sharma MD    ANESTHESIA:  General.    COMPLICATIONS:  None. ESTIMATED BLOOD LOSS:  100 mL. SPECIMENS REMOVED:  None. IMPLANTS:  See note. ASSISTANT:  GORDO Sosa    INDICATIONS:  This is an 55-year-old female who failed conservative management for left knee severe end-stage osteoarthritis. The risks, benefits and alternatives of surgery were explained in detail and she agreed to proceed. Her pain progressed to the point that normal daily activities were severely impacted and nonoperative methods had failed to manage her symptoms. TECHNIQUE:  The patient was taken to the operating room, laid supine on the operating table. General anesthetic was administered and the left leg was prepped and draped in usual sterile fashion. A tourniquet was applied to the left thigh above the knee and the knee was prepped and draped free in the usual sterile fashion and timeout was called. Following this, the limb was exsanguinated, tourniquet was inflated to 275 mmHg. Midline incision was carried out followed by medial parapatellar approach. The fat pad was resected. A very minimal medial release was carried out. The patella was everted when the knee was extended and about 8 mm resected off the underside. A metallic plate was placed to protect the cut surface. The patella was placed in a noneverted position and swept laterally and the knee was flexed.   Retractors were placed and then a femoral tracker was pinned on the end of the femur and after appropriate data point entry, distal femoral cut was then made at 9 degrees from the high side at neutral varus/valgus angulation about 5 degrees of flexion. A chamfer guide for a size 3 femur was then placed in 6 degrees of external rotation referencing the posterior condyles and the anterior and posterior chamfer cuts were made. The ACL was then resected. The tibia was subluxed forward and a tracker pinned on the proximal tibia and after appropriate data point entry, a proximal tibia cut was then made at 6 mm and 7 mm from the tibial surface at 3 degrees posterior slope, neutral varus valgus angulation. The laminar  was then placed at 90 degrees of flexion and medial and lateral menisci were removed. Posterior release was carried out. Posterior capsule was infiltrated with Marcaine with epinephrine, Toradol, morphine. The knee was then trialled with a 3 femur as well as a 3 tibia with an 11 insert. The 11 gave excellent stability while maintaining full range of motion. The tray was pinned in place. Lug holes were drilled on the femur, which was then removed and then the proximal tibia was drilled and then punched to accept the universal tibial baseplate. All trial components were removed and the raw bone surfaces were copiously irrigated and dried. Following this, using third generation mixed cement, the tibial tray was cemented into place and the femur was cemented into place. A trial 11 insert was placed and the knee was brought out into extension. The tourniquet was let down at this point and 3 drill holes were placed for a 29 patella and an all poly patella was then cemented into place as well. Cement was then allowed to harden and excess cement was removed along the way. The wound was then copiously irrigated. Once the cement had hardened and put through range of motion, a lateral release was made to improve patellofemoral tracking. The trial 9 liner was swapped out for a final size 9 CS insert with good stability and range of motion achieved. After remainder of the irrigation was irrigated through the joint, the extensor mechanism was closed with interrupted #1 Vicryl, the subcutaneous with 2-0 Vicryl and the skin with staples. Sterile dressing was then applied followed by an Ace wrap. The patient tolerated the procedure well and was stable to recovery room in satisfactory condition without any immediate complication.       MD MARTA Horton / ISAÍAS  D: 08/21/2018 21:38     T: 08/22/2018 06:23  JOB #: [id number]

## 2018-08-30 ENCOUNTER — HOME CARE VISIT (OUTPATIENT)
Dept: SCHEDULING | Facility: HOME HEALTH | Age: 83
End: 2018-08-30
Payer: MEDICARE

## 2018-08-30 PROCEDURE — 3331090002 HH PPS REVENUE DEBIT

## 2018-08-30 PROCEDURE — 3331090001 HH PPS REVENUE CREDIT

## 2018-08-30 PROCEDURE — G0151 HHCP-SERV OF PT,EA 15 MIN: HCPCS

## 2018-08-31 VITALS
DIASTOLIC BLOOD PRESSURE: 58 MMHG | TEMPERATURE: 97.5 F | OXYGEN SATURATION: 98 % | SYSTOLIC BLOOD PRESSURE: 110 MMHG | HEART RATE: 66 BPM

## 2018-08-31 PROCEDURE — 3331090001 HH PPS REVENUE CREDIT

## 2018-08-31 PROCEDURE — 3331090002 HH PPS REVENUE DEBIT

## 2018-09-01 PROCEDURE — 3331090002 HH PPS REVENUE DEBIT

## 2018-09-01 PROCEDURE — 3331090001 HH PPS REVENUE CREDIT

## 2018-09-02 PROCEDURE — 3331090002 HH PPS REVENUE DEBIT

## 2018-09-02 PROCEDURE — 3331090001 HH PPS REVENUE CREDIT

## 2018-09-03 PROCEDURE — 3331090002 HH PPS REVENUE DEBIT

## 2018-09-03 PROCEDURE — 3331090001 HH PPS REVENUE CREDIT

## 2018-09-04 ENCOUNTER — HOME CARE VISIT (OUTPATIENT)
Dept: SCHEDULING | Facility: HOME HEALTH | Age: 83
End: 2018-09-04
Payer: MEDICARE

## 2018-09-04 PROCEDURE — 3331090001 HH PPS REVENUE CREDIT

## 2018-09-04 PROCEDURE — 3331090002 HH PPS REVENUE DEBIT

## 2018-09-04 PROCEDURE — G0151 HHCP-SERV OF PT,EA 15 MIN: HCPCS

## 2018-09-04 PROCEDURE — 3331090003 HH PPS REVENUE ADJ

## 2018-09-05 VITALS
HEART RATE: 76 BPM | OXYGEN SATURATION: 98 % | TEMPERATURE: 97.3 F | SYSTOLIC BLOOD PRESSURE: 112 MMHG | DIASTOLIC BLOOD PRESSURE: 64 MMHG

## 2018-09-05 PROCEDURE — 3331090001 HH PPS REVENUE CREDIT

## 2018-09-05 PROCEDURE — 3331090002 HH PPS REVENUE DEBIT

## 2018-09-06 NOTE — H&P
PRE-OP HISTORY AND PHYSICAL    Subjective:     Patient is a 80 y.o.  female presented with a history of left knee pain. Onset of symptoms was gradual with gradually worsening course since that time. She is being admitted for surgical management of this condition. The indications for the procedure include left knee severe osteoarthritis affecting daily activities and unresponsive to conservative treatment. Patient Active Problem List    Diagnosis Date Noted    Status post total left knee replacement 08/21/2018    Idiopathic small and large fiber sensory neuropathy 05/15/2017    Diabetic peripheral neuropathy associated with type 2 diabetes mellitus ([facility] [address] 05/15/2017    B12 deficiency 05/15/2017    Vitamin D deficiency 05/15/2017    Bilateral carpal tunnel syndrome 05/15/2017    Cervical radiculopathy due to osteoarthritis of spine 05/15/2017    Lumbar back pain with radiculopathy affecting left lower extremity 05/15/2017    Lumbar back pain with radiculopathy affecting right lower extremity 05/15/2017    Primary osteoarthritis of right knee 08/17/2016    Primary localized osteoarthritis of right knee 08/17/2016     Past Medical History:   Diagnosis Date    Arthritis     Chronic pain     knee    Diabetes ([facility] [address]     GERD (gastroesophageal reflux disease)     Hypertension     Ill-defined condition     seaonal allergies    Psychiatric disorder     anxiety      Past Surgical History:   Procedure Laterality Date    HX CHOLECYSTECTOMY      HX ÓSCAR AND BSO      MS COLONOSCOPY FLX DX W/COLLJ SPEC WHEN PFRMD  11/21/2013         TOTAL KNEE ARTHROPLASTY  2016    right    UPPER GI ENDOSCOPY,BIOPSY  11/3/2015           Prior to Admission medications    Medication Sig Start Date End Date Taking? Authorizing Provider   acetaminophen (TYLENOL) 325 mg tablet Take 2 Tabs by mouth every six (6) hours for 14 days.  8/22/18 9/5/18 Yes Tami Dooley NP   aspirin delayed-release 325 mg tablet Take 1 Tab by mouth two (2) times a day for 30 days. 8/22/18 9/21/18 Yes Babar Rizo NP   oxyCODONE IR (ROXICODONE) 5 mg immediate release tablet Take 1-2 Tabs by mouth every four (4) hours as needed. Max Daily Amount: 60 mg. If insurance prior authorization or quantity restrictions apply, refer to and look up diagnosis code one prescription. Partial fill as needed is permitted. Please do not contact prescriber. 8/22/18  Yes Babar Rizo NP   senna-docusate (PERICOLACE) 8.6-50 mg per tablet Take 1 Tab by mouth daily. 8/22/18  Yes Babar Rizo NP   polyethylene glycol (MIRALAX) 17 gram packet Take 1 Packet by mouth daily as needed (constipation) for up to 15 days. 8/22/18 9/6/18 Yes Babar Rizo NP   gabapentin (NEURONTIN) 100 mg capsule TAKE 1 CAPSULE BY MOUTH THREE TIMES DAILY 8/9/18  Yes Martir LAWRENCE NP   cholecalciferol, vitamin D3, (VITAMIN D3 PO) Take  by mouth daily. Yes Historical Provider   cyanocobalamin, vitamin B-12, (VITAMIN B-12 PO) Take  by mouth. Yes Historical Provider   cetirizine (ZYRTEC) 10 mg tablet Take  by mouth as needed for Allergies. Yes Historical Provider   diclofenac (VOLTAREN) 1 % gel Apply 2 g to affected area four (4) times daily as needed. Apply to feet. 1/3/18  Yes Coleman Mercer NP   metFORMIN ER (GLUCOPHAGE XR) 500 mg tablet TK 1 T PO ONCE QD WITH DINNER 4/3/17  Yes Historical Provider   omeprazole (PRILOSEC) 20 mg capsule Take 20 mg by mouth daily. Yes Historical Provider   citalopram (CELEXA) 20 mg tablet Take 20 mg by mouth daily. Yes Historical Provider   atorvastatin (LIPITOR) 20 mg tablet Take 20 mg by mouth daily. Yes Historical Provider   nystatin (MYCOSTATIN) 100,000 unit/mL suspension Take 15 mL by mouth three (3) times daily. Swish 15ml by mouth for 3 mins 3x/ day until resolved for 48 hours 8/28/18   Alvaro Guerrero MD   atorvastatin (LIPITOR) 40 mg tablet Take 40 mg by mouth daily.     Alvaro Guerrero MD   citalopram (CELEXA) 10 mg tablet Take 10 mg by mouth daily. Karine Juares MD   diclofenac (VOLTAREN) 1 % gel Apply 2 g to affected area four (4) times daily as needed (pain). apply to feet    Historical Provider   cyanocobalamin (VITAMIN B-12) 1,000 mcg tablet Take 1,000 mcg by mouth daily. Historical Provider   cholecalciferol, VITAMIN D3, (VITAMIN D3) 5,000 unit tab tablet Take 5,000 Units by mouth daily. Historical Provider   traMADol (ULTRAM) 50 mg tablet Take 50 mg by mouth every six (6) hours as needed for Pain. Take 1 to 2 tabs every 6 hours as needed for pain    Historical Provider   POTASSIUM PO Take 10 mEq by mouth daily. potassium cl 10 mEq    Historical Provider   hydrochlorothiazide (HYDRODIURIL) 25 mg tablet Take 1 Tab by mouth daily. Restart when Blood Pressure is greater than 120/80 8/19/16   Timmy Marina, LANI     Allergies   Allergen Reactions    Codeine Other (comments)     fainting      Social History   Substance Use Topics    Smoking status: Never Smoker    Smokeless tobacco: Never Used    Alcohol use Yes      Comment: social      Family History   Problem Relation Age of Onset    No Known Problems Mother     No Known Problems Father     Heart Disease Child     Arthritis-osteo Child     Hypertension Child       Review of Systems  A comprehensive review of systems was negative except for that written in the HPI. Objective:     No data found. Left knee tender to rom, varus deformity, hip nt, nvi    Imaging Review  Left knee severe grade IV osteoarthritis    Assessment:     Principal Problem:    Status post total left knee replacement (8/21/2018)        Plan:     The various methods of treatment have been discussed with the patient and family. After consideration of risks, benefits and other options for treatment, the patient has consented to surgical interventions (left total knee arthropasty with navigation). Questions were answered and Pre-op teaching was done by staff.

## 2019-02-05 ENCOUNTER — HOSPITAL ENCOUNTER (OUTPATIENT)
Dept: MAMMOGRAPHY | Age: 84
Discharge: HOME | End: 2019-02-05
Attending: FAMILY MEDICINE
Payer: MEDICARE

## 2019-02-05 DIAGNOSIS — Z12.39 SCREENING BREAST EXAMINATION: ICD-10-CM

## 2019-02-05 PROCEDURE — 77067 SCR MAMMO BI INCL CAD: CPT

## 2019-03-13 ENCOUNTER — HOSPITAL ENCOUNTER (OUTPATIENT)
Dept: BONE DENSITY | Age: 84
Discharge: HOME | End: 2019-03-13
Attending: FAMILY MEDICINE
Payer: MEDICARE

## 2019-03-13 DIAGNOSIS — Z78.0 MENOPAUSE: ICD-10-CM

## 2019-03-13 PROCEDURE — 77080 DXA BONE DENSITY AXIAL: CPT

## 2019-04-24 DIAGNOSIS — G56.03 BILATERAL CARPAL TUNNEL SYNDROME: ICD-10-CM

## 2019-04-24 DIAGNOSIS — M54.16 LUMBAR BACK PAIN WITH RADICULOPATHY AFFECTING RIGHT LOWER EXTREMITY: ICD-10-CM

## 2019-04-24 DIAGNOSIS — E53.8 B12 DEFICIENCY: ICD-10-CM

## 2019-04-24 DIAGNOSIS — M54.16 LUMBAR BACK PAIN WITH RADICULOPATHY AFFECTING LEFT LOWER EXTREMITY: ICD-10-CM

## 2019-04-24 DIAGNOSIS — G60.8 IDIOPATHIC SMALL AND LARGE FIBER SENSORY NEUROPATHY: ICD-10-CM

## 2019-04-24 DIAGNOSIS — E55.9 VITAMIN D DEFICIENCY: ICD-10-CM

## 2019-04-24 DIAGNOSIS — M47.22 CERVICAL RADICULOPATHY DUE TO OSTEOARTHRITIS OF SPINE: ICD-10-CM

## 2019-04-24 DIAGNOSIS — E11.42 DIABETIC PERIPHERAL NEUROPATHY ASSOCIATED WITH TYPE 2 DIABETES MELLITUS (HCC): ICD-10-CM

## 2019-05-09 DIAGNOSIS — G56.03 BILATERAL CARPAL TUNNEL SYNDROME: ICD-10-CM

## 2019-05-09 DIAGNOSIS — E11.42 DIABETIC PERIPHERAL NEUROPATHY ASSOCIATED WITH TYPE 2 DIABETES MELLITUS (HCC): ICD-10-CM

## 2019-05-09 DIAGNOSIS — E55.9 VITAMIN D DEFICIENCY: ICD-10-CM

## 2019-05-09 DIAGNOSIS — M47.22 CERVICAL RADICULOPATHY DUE TO OSTEOARTHRITIS OF SPINE: ICD-10-CM

## 2019-05-09 DIAGNOSIS — M54.16 LUMBAR BACK PAIN WITH RADICULOPATHY AFFECTING RIGHT LOWER EXTREMITY: ICD-10-CM

## 2019-05-09 DIAGNOSIS — G60.8 IDIOPATHIC SMALL AND LARGE FIBER SENSORY NEUROPATHY: ICD-10-CM

## 2019-05-09 DIAGNOSIS — E53.8 B12 DEFICIENCY: ICD-10-CM

## 2019-05-09 DIAGNOSIS — M54.16 LUMBAR BACK PAIN WITH RADICULOPATHY AFFECTING LEFT LOWER EXTREMITY: ICD-10-CM

## 2019-05-09 NOTE — TELEPHONE ENCOUNTER
Patient needs a rx refill on gabapentin. Made an appt with Debbie because [facility] doesn't have anything.

## 2019-05-10 RX ORDER — GABAPENTIN 100 MG/1
100 CAPSULE ORAL 3 TIMES DAILY
Qty: 90 CAP | Refills: 4 | Status: SHIPPED | OUTPATIENT
Start: 2019-05-10 | End: 2019-10-17 | Stop reason: SDUPTHER

## 2019-05-10 NOTE — TELEPHONE ENCOUNTER
Future Appointments   Date Time Provider Laura Robertsi   5/22/2019  9:40 Alba Cai., MD Addy Horton                         Last Appointment My Department:  1/3/18    Please advise of refill below. Requested Prescriptions     Pending Prescriptions Disp Refills    gabapentin (NEURONTIN) 100 mg capsule 90 Cap 4     Sig: Take 1 Cap by mouth three (3) times daily.

## 2019-05-22 ENCOUNTER — OFFICE VISIT (OUTPATIENT)
Dept: NEUROLOGY | Age: 84
End: 2019-05-22

## 2019-05-22 VITALS
HEART RATE: 86 BPM | HEIGHT: 62 IN | OXYGEN SATURATION: 98 % | BODY MASS INDEX: 26.13 KG/M2 | SYSTOLIC BLOOD PRESSURE: 124 MMHG | WEIGHT: 142 LBS | DIASTOLIC BLOOD PRESSURE: 64 MMHG

## 2019-05-22 DIAGNOSIS — E11.42 DIABETIC PERIPHERAL NEUROPATHY ASSOCIATED WITH TYPE 2 DIABETES MELLITUS (HCC): Primary | ICD-10-CM

## 2019-05-22 NOTE — LETTER
6/3/19 Patient: Tushar Ly YOB: 1932 Date of Visit: 5/22/2019 Karine Juares MD 
[facility] [address]  [address] VIA In Basket Dear Karine Juares MD, Thank you for referring Ms. Tushar Ly to [address] [place] [address] for evaluation. My notes for this consultation are attached. If you have questions, please do not hesitate to call me. I look forward to following your patient along with you. Sincerely, Yobani Valdez MD

## 2019-05-22 NOTE — PROGRESS NOTES
NEUROLOGY CLINIC NOTE    Patient ID:  Lakisha Redman  [id number]  38 y.o.  9/22/1932    Date of Consultation:  May 22, 2019    Reason for Consultation: follow up for neuropathy  Chief Complaint   Patient presents with    Follow-up    Medication Evaluation       History of Present Illness:     Patient Active Problem List    Diagnosis Date Noted    Status post total left knee replacement 08/21/2018    Idiopathic small and large fiber sensory neuropathy 05/15/2017    Diabetic peripheral neuropathy associated with type 2 diabetes mellitus ([address] 05/15/2017    B12 deficiency 05/15/2017    Vitamin D deficiency 05/15/2017    Bilateral carpal tunnel syndrome 05/15/2017    Cervical radiculopathy due to osteoarthritis of spine 05/15/2017    Lumbar back pain with radiculopathy affecting left lower extremity 05/15/2017    Lumbar back pain with radiculopathy affecting right lower extremity 05/15/2017    Primary osteoarthritis of right knee 08/17/2016    Primary localized osteoarthritis of right knee 08/17/2016     Past Medical History:   Diagnosis Date    Arthritis     Chronic pain     knee    Diabetes ([address]     GERD (gastroesophageal reflux disease)     Hypertension     Ill-defined condition     seaonal allergies    Psychiatric disorder     anxiety      Past Surgical History:   Procedure Laterality Date    HX CHOLECYSTECTOMY      HX ÓSCAR AND BSO      IN COLONOSCOPY FLX DX W/COLLJ SPEC WHEN PFRMD  11/21/2013         TOTAL KNEE ARTHROPLASTY  2016    right    UPPER GI ENDOSCOPY,BIOPSY  11/3/2015           Prior to Admission medications    Medication Sig Start Date End Date Taking? Authorizing Provider   gabapentin (NEURONTIN) 100 mg capsule Take 1 Cap by mouth three (3) times daily. 5/10/19  Yes Swetha Leary MD   atorvastatin (LIPITOR) 40 mg tablet Take 40 mg by mouth daily. Yes Marija Alonso MD   cyanocobalamin (VITAMIN B-12) 1,000 mcg tablet Take 1,000 mcg by mouth daily.    Yes Provider, Historical POTASSIUM PO Take 10 mEq by mouth daily. potassium cl 10 mEq   Yes Provider, Historical   cholecalciferol, vitamin D3, (VITAMIN D3 PO) Take  by mouth daily. Yes Provider, Historical   cetirizine (ZYRTEC) 10 mg tablet Take  by mouth as needed for Allergies. Yes Provider, Historical   metFORMIN ER (GLUCOPHAGE XR) 500 mg tablet TK 1 T PO ONCE QD WITH DINNER 4/3/17  Yes Provider, Historical   hydrochlorothiazide (HYDRODIURIL) 25 mg tablet Take 1 Tab by mouth daily. Restart when Blood Pressure is greater than 120/80 8/19/16  Yes Bennett Vital NP   omeprazole (PRILOSEC) 20 mg capsule Take 20 mg by mouth daily. Yes Provider, Historical   citalopram (CELEXA) 20 mg tablet Take 20 mg by mouth daily. Yes Provider, Historical   atorvastatin (LIPITOR) 20 mg tablet Take 20 mg by mouth daily. Yes Provider, Historical   nystatin (MYCOSTATIN) 100,000 unit/mL suspension Take 15 mL by mouth three (3) times daily. Swish 15ml by mouth for 3 mins 3x/ day until resolved for 48 hours 8/28/18   Sinan Troncoso MD   citalopram (CELEXA) 10 mg tablet Take 10 mg by mouth daily. Allie Rice MD   diclofenac (VOLTAREN) 1 % gel Apply 2 g to affected area four (4) times daily as needed (pain). apply to feet    Provider, Historical   cholecalciferol, VITAMIN D3, (VITAMIN D3) 5,000 unit tab tablet Take 5,000 Units by mouth daily. Provider, Historical   traMADol (ULTRAM) 50 mg tablet Take 50 mg by mouth every six (6) hours as needed for Pain. Take 1 to 2 tabs every 6 hours as needed for pain    Provider, Historical   oxyCODONE IR (ROXICODONE) 5 mg immediate release tablet Take 1-2 Tabs by mouth every four (4) hours as needed. Max Daily Amount: 60 mg. If insurance prior authorization or quantity restrictions apply, refer to and look up diagnosis code one prescription. Partial fill as needed is permitted. Please do not contact prescriber.  8/22/18   Bennett Vital NP   senna-docusate (PERICOLACE) 8.6-50 mg per tablet Take 1 Tab by mouth daily. 8/22/18   Naina Davey NP   cyanocobalamin, vitamin B-12, (VITAMIN B-12 PO) Take  by mouth. Provider, Historical   diclofenac (VOLTAREN) 1 % gel Apply 2 g to affected area four (4) times daily as needed. Apply to feet. 1/3/18   Marielle Licona NP     Allergies   Allergen Reactions    Codeine Other (comments)     fainting      Social History     Tobacco Use    Smoking status: Never Smoker    Smokeless tobacco: Never Used   Substance Use Topics    Alcohol use: Yes     Comment: social      Family History   Problem Relation Age of Onset    No Known Problems Mother     No Known Problems Father     Heart Disease Child     Arthritis-osteo Child     Hypertension Child         Subjective:      Kelly Rivero is a 80 y.o. AA female who is being seen in this clinic for diabetic polyneuropathy. Patient was last seen by Babita Ignacio NP 1/3/2018. Note then mentions patient was being followed for numbness in the toes and fingers. Previous EMG/NCS showed mild length dependent neuropathy in the lower extremity and mild right carpal tunnel syndrome. Patient was taking gabapentin 100 mg 3 times daily and Motrin as needed for her pain. Since then, patient reports her condition remains to be the same. No worsening of her paresthesias involving her hands and feet. She continues to take gabapentin 100 mg 3 times daily which offers relief. Denies any side effects. She also mentions good control of her diabetes. Outside reports reviewed: office notes, historical medical records. Review of Systems:    A comprehensive review of systems was performed:   Positive for hearing loss, joint pain, myalgia, bone pain, anxiety, depression, headaches    Objective:     Visit Vitals  /64   Pulse 86   Ht 5' 2\" (1.575 m)   Wt 142 lb (64.4 kg)   LMP  (LMP Unknown)   SpO2 98%   BMI 25.97 kg/m²       PHYSICAL EXAM:    NEUROLOGICAL EXAM:    Appearance:   The patient is well developed, well nourished, provides a coherent history and is in no acute distress. Mental Status: Oriented to time, place and person. Fluent, no aphasia or dysarthria. Mood and affect appropriate. Cranial Nerves:   II - XII were intact. Motor:  5/5 strength in upper and lower proximal and distal muscles. Normal bulk and tone. No pronator drift. Reflexes:   Deep tendon reflexes were symmetrical. Downgoing toes. Sensory:   Stocking>glove sensory deficit. Gait:  Steady. No Romberg. Tremor:   No tremor noted. Cerebellar:  Intact FTN/SCHUYLER/HTS. Assessment:   Diabetic polyneuropathy    Plan:   Neurological examination reveals stocking greater than glove sensory deficit typically seen in polyneuropathies. In this case likely due to diabetes. No evidence of worsening. Continue symptomatic treatment with gabapentin 100 mg 3 times daily. Prescription refill was previously provided. Patient was advised to continue doing structured physical exercises. Fall precautions. Use all necessary equipment to prevent from falling. Explained also direct correlate between diabetic control and progression of her neuropathy. Patient understood. All questions and concerns were answered. Visit lasted 25 minutes.   Greater than 50% was spent reviewing her medical records as summarized above, doing medication reconciliation, discussion about her condition, etiology, prognosis, direct correlate between diabetic control and progression of her neuropathy, structured exercises, fall precautions, treatment options, medication

## 2019-08-15 ENCOUNTER — HOSPITAL ENCOUNTER (OUTPATIENT)
Dept: CT IMAGING | Age: 84
Discharge: HOME | End: 2019-08-15
Attending: FAMILY MEDICINE
Payer: MEDICARE

## 2019-08-15 DIAGNOSIS — M51.34 DEGENERATION OF INTERVERTEBRAL DISC OF THORACIC REGION: ICD-10-CM

## 2019-08-15 DIAGNOSIS — R07.81 PLEURODYNIA: ICD-10-CM

## 2019-08-15 PROCEDURE — 71250 CT THORAX DX C-: CPT

## 2019-09-04 NOTE — PATIENT INSTRUCTIONS
A Healthy Lifestyle: Care Instructions  Your Care Instructions    A healthy lifestyle can help you feel good, stay at a healthy weight, and have plenty of energy for both work and play. A healthy lifestyle is something you can share with your whole family. A healthy lifestyle also can lower your risk for serious health problems, such as high blood pressure, heart disease, and diabetes. You can follow a few steps listed below to improve your health and the health of your family. Follow-up care is a key part of your treatment and safety. Be sure to make and go to all appointments, and call your doctor if you are having problems. It's also a good idea to know your test results and keep a list of the medicines you take. How can you care for yourself at home? · Do not eat too much sugar, fat, or fast foods. You can still have dessert and treats now and then. The goal is moderation. · Start small to improve your eating habits. Pay attention to portion sizes, drink less juice and soda pop, and eat more fruits and vegetables. ? Eat a healthy amount of food. A 3-ounce serving of meat, for example, is about the size of a deck of cards. Fill the rest of your plate with vegetables and whole grains. ? Limit the amount of soda and sports drinks you have every day. Drink more water when you are thirsty. ? Eat at least 5 servings of fruits and vegetables every day. It may seem like a lot, but it is not hard to reach this goal. A serving or helping is 1 piece of fruit, 1 cup of vegetables, or 2 cups of leafy, raw vegetables. Have an apple or some carrot sticks as an afternoon snack instead of a candy bar. Try to have fruits and/or vegetables at every meal.  · Make exercise part of your daily routine. You may want to start with simple activities, such as walking, bicycling, or slow swimming. Try to be active 30 to 60 minutes every day. You do not need to do all 30 to 60 minutes all at once.  For example, you can exercise 3 times a day for 10 or 20 minutes. Moderate exercise is safe for most people, but it is always a good idea to talk to your doctor before starting an exercise program.  · Keep moving. Tobias Hwang the lawn, work in the garden, or [employer]. Take the stairs instead of the elevator at work. · If you smoke, quit. People who smoke have an increased risk for heart attack, stroke, cancer, and other lung illnesses. Quitting is hard, but there are ways to boost your chance of quitting tobacco for good. ? Use nicotine gum, patches, or lozenges. ? Ask your doctor about stop-smoking programs and medicines. ? Keep trying. In addition to reducing your risk of diseases in the future, you will notice some benefits soon after you stop using tobacco. If you have shortness of breath or asthma symptoms, they will likely get better within a few weeks after you quit. · Limit how much alcohol you drink. Moderate amounts of alcohol (up to 2 drinks a day for men, 1 drink a day for women) are okay. But drinking too much can lead to liver problems, high blood pressure, and other health problems. Family health  If you have a family, there are many things you can do together to improve your health. · Eat meals together as a family as often as possible. · Eat healthy foods. This includes fruits, vegetables, lean meats and dairy, and whole grains. · Include your family in your fitness plan. Most people think of activities such as jogging or tennis as the way to fitness, but there are many ways you and your family can be more active. Anything that makes you breathe hard and gets your heart pumping is exercise. Here are some tips:  ? Walk to do errands or to take your child to school or the bus.  ? Go for a family bike ride after dinner instead of watching TV. Where can you learn more? Go to http://anna-carlos.info/. Enter Z319 in the search box to learn more about \"A Healthy Lifestyle: Care Instructions. \"  Current as of: September 11, 2018  Content Version: 11.9  © 0437-6656 [employer]. Care instructions adapted under license by [employer] (which disclaims liability or warranty for this information). If you have questions about a medical condition or this instruction, always ask your healthcare professional. [address] any warranty or liability for your use of this information. Diabetic Neuropathy: Care Instructions  Your Care Instructions    When you have diabetes, your blood sugar level may get too high. Over time, high blood sugar levels can damage nerves. This is called diabetic neuropathy. Nerve damage can cause pain, burning, tingling, and numbness and may leave you feeling weak. The feet are often affected. When you have nerve damage in your feet, you cannot feel your feet and toes as well as normal and may not notice cuts or sores. Even a small injury can lead to a serious infection. It is very important that you follow your doctor's advice on foot care. Sometimes diabetes damages nerves that help the body function. If this happens, your blood pressure, sweating, digestion, and urination might be affected. Your doctor may give you a target blood sugar level that is higher or lower than you are used to. Try to keep your blood sugar very close to this target level to prevent more damage. Follow-up care is a key part of your treatment and safety. Be sure to make and go to all appointments, and call your doctor if you are having problems. It's also a good idea to know your test results and keep a list of the medicines you take. How can you care for yourself at home? · Take your medicines exactly as prescribed. Call your doctor if you think you are having a problem with your medicine. It is very important that you take your insulin or diabetes pills as your doctor tells you. · Try to keep blood sugar at your target level.   ? Eat a variety of healthy foods, with carbohydrate spread out in your meals. A dietitian can help you plan meals. ? Try to get at least 30 minutes of exercise on most days. ? Check your blood sugar as many times each day as your doctor recommends. · Take and record your blood pressure at home if your doctor tells you to. Learn the importance of the two measures of blood pressure (such as 130 over 80, or 130/80). To take your blood pressure at home:  ? Ask your doctor to check your blood pressure monitor to be sure it is accurate and the cuff fits you. Also ask your doctor to watch you to make sure that you are using it right. ? Do not use medicine known to raise blood pressure (such as some nasal decongestant sprays) before taking your blood pressure. ? Avoid taking your blood pressure if you have just exercised or are nervous or upset. Rest at least 15 minutes before you take a reading. · Take pain medicines exactly as directed. ? If the doctor gave you a prescription medicine for pain, take it as prescribed. ? If you are not taking a prescription pain medicine, ask your doctor if you can take an over-the-counter medicine. · Do not smoke. Smoking can increase your chance for a heart attack or stroke. If you need help quitting, talk to your doctor about stop-smoking programs and medicines. These can increase your chances of quitting for good. · Limit alcohol to 2 drinks a day for men and 1 drink a day for women. Too much alcohol can cause health problems. · Eat small meals often, rather than 2 or 3 large meals a day. To care for your feet  · Prevent injury by wearing shoes at all times, even when you are indoors. · Do foot care as part of your daily routine. Wash your feet and then rub lotion on your feet, but not between your toes. Use a handheld mirror or magnifying mirror to inspect your feet for blisters, cuts, cracks, or sores. · Have your toenails trimmed and filed straight across. · Wear shoes and socks that fit well.  Soft shoes that have good support and that fit well (such as tennis shoes) are best for your feet. · Check your shoes for any loose objects or rough edges before you put them on. · Ask your doctor to check your feet during each visit. Your doctor may notice a foot problem you have missed. · Get early treatment for any foot problem, even a minor one. When should you call for help? Call your doctor now or seek immediate medical care if:    · You have symptoms of infection, such as:  ? Increased pain, swelling, warmth, or redness. ? Red streaks leading from the area. ? Pus draining from the area. ? A fever.     · You have new or worse numbness, pain, or tingling in any part of your body.    Watch closely for changes in your health, and be sure to contact your doctor if:    · You have a new problem with your feet, such as:  ? A new sore or ulcer. ? A break in the skin that is not healing after several days. ? Bleeding corns or calluses. ? An ingrown toenail.     · You do not get better as expected. Where can you learn more? Go to http://anna-carlos.info/. Enter J604 in the search box to learn more about \"Diabetic Neuropathy: Care Instructions. \"  Current as of: July 25, 2018  Content Version: 11.9  © 4595-6327 [employer]. Care instructions adapted under license by [employer] (which disclaims liability or warranty for this information). If you have questions about a medical condition or this instruction, always ask your healthcare professional. [place] [address] any warranty or liability for your use of this information. Preventing Falls: Care Instructions  Your Care Instructions    Getting around your home safely can be a challenge if you have injuries or health problems that make it easy for you to fall. Loose rugs and furniture in walkways are among the dangers for many older people who have problems walking or who have poor eyesight. "I had surgery" People who have conditions such as arthritis, osteoporosis, or dementia also have to be careful not to fall. You can make your home safer with a few simple measures. Follow-up care is a key part of your treatment and safety. Be sure to make and go to all appointments, and call your doctor if you are having problems. It's also a good idea to know your test results and keep a list of the medicines you take. How can you care for yourself at home? Taking care of yourself  · You may get dizzy if you do not drink enough water. To prevent dehydration, drink plenty of fluids, enough so that your urine is light yellow or clear like water. Choose water and other caffeine-free clear liquids. If you have kidney, heart, or liver disease and have to limit fluids, talk with your doctor before you increase the amount of fluids you drink. · Exercise regularly to improve your strength, muscle tone, and balance. Walk if you can. Swimming may be a good choice if you cannot walk easily. · Have your vision and hearing checked each year or any time you notice a change. If you have trouble seeing and hearing, you might not be able to avoid objects and could lose your balance. · Know the side effects of the medicines you take. Ask your doctor or pharmacist whether the medicines you take can affect your balance. Sleeping pills or sedatives can affect your balance. · Limit the amount of alcohol you drink. Alcohol can impair your balance and other senses. · Ask your doctor whether calluses or corns on your feet need to be removed. If you wear loose-fitting shoes because of calluses or corns, you can lose your balance and fall. · Talk to your doctor if you have numbness in your feet. Preventing falls at home  · Remove raised doorway thresholds, throw rugs, and clutter. Repair loose carpet or raised areas in the floor. · Move furniture and electrical cords to keep them out of walking paths.   · Use nonskid floor wax, and wipe up spills right away, especially on ceramic tile floors. · If you use a walker or cane, put rubber tips on it. If you use crutches, clean the bottoms of them regularly with an abrasive pad, such as steel wool. · Keep your house well lit, especially [place], and outside walkways. Use night-lights in areas such as hallways and bathrooms. Add extra light switches or use remote switches (such as switches that go on or off when you clap your hands) to make it easier to turn lights on if you have to get up during the night. · Install sturdy handrails on stairways. · Move items in your cabinets so that the things you use a lot are on the lower shelves (about waist level). · Keep a cordless phone and a flashlight with new batteries by your bed. If possible, put a phone in each of the main rooms of your house, or carry a cell phone in case you fall and cannot reach a phone. Or, you can wear a device around your neck or wrist. You push a button that sends a signal for help. · Wear low-heeled shoes that fit well and give your feet good support. Use footwear with nonskid soles. Check the heels and soles of your shoes for wear. Repair or replace worn heels or soles. · Do not wear socks without shoes on wood floors. · Walk on the grass when the sidewalks are slippery. If you live in an area that gets snow and ice in the winter, sprinkle salt on slippery steps and sidewalks. Preventing falls in the bath  · Install grab bars and nonskid mats inside and outside your shower or tub and near the toilet and sinks. · Use shower chairs and bath benches. · Use a hand-held shower head that will allow you to sit while showering. · Get into a tub or shower by putting the weaker leg in first. Get out of a tub or shower with your strong side first.  · Repair loose toilet seats and consider installing a raised toilet seat to make getting on and off the toilet easier.   · Keep your bathroom door unlocked while you are in the shower. Where can you learn more? Go to http://anna-carlos.info/. Enter 0476 [id number] in the search box to learn more about \"Preventing Falls: Care Instructions. \"  Current as of: March 15, 2018  Content Version: 11.9  © 4025-8773 [employer]. Care instructions adapted under license by [employer] (which disclaims liability or warranty for this information). If you have questions about a medical condition or this instruction, always ask your healthcare professional. [place] [address] any warranty or liability for your use of this information.

## 2019-09-13 RX ORDER — GABAPENTIN 100 MG/1
CAPSULE ORAL
Qty: 90 CAP | Refills: 0 | OUTPATIENT
Start: 2019-09-13

## 2019-10-17 ENCOUNTER — TELEPHONE (OUTPATIENT)
Dept: NEUROLOGY | Age: 84
End: 2019-10-17

## 2019-10-17 DIAGNOSIS — M54.16 LUMBAR BACK PAIN WITH RADICULOPATHY AFFECTING LEFT LOWER EXTREMITY: ICD-10-CM

## 2019-10-17 DIAGNOSIS — M47.22 CERVICAL RADICULOPATHY DUE TO OSTEOARTHRITIS OF SPINE: ICD-10-CM

## 2019-10-17 DIAGNOSIS — E53.8 B12 DEFICIENCY: ICD-10-CM

## 2019-10-17 DIAGNOSIS — E55.9 VITAMIN D DEFICIENCY: ICD-10-CM

## 2019-10-17 DIAGNOSIS — G60.8 IDIOPATHIC SMALL AND LARGE FIBER SENSORY NEUROPATHY: ICD-10-CM

## 2019-10-17 DIAGNOSIS — G56.03 BILATERAL CARPAL TUNNEL SYNDROME: ICD-10-CM

## 2019-10-17 DIAGNOSIS — E11.42 DIABETIC PERIPHERAL NEUROPATHY ASSOCIATED WITH TYPE 2 DIABETES MELLITUS (HCC): ICD-10-CM

## 2019-10-17 DIAGNOSIS — M54.16 LUMBAR BACK PAIN WITH RADICULOPATHY AFFECTING RIGHT LOWER EXTREMITY: ICD-10-CM

## 2019-10-17 RX ORDER — GABAPENTIN 100 MG/1
100 CAPSULE ORAL 3 TIMES DAILY
Qty: 90 CAP | Refills: 0 | Status: SHIPPED | OUTPATIENT
Start: 2019-10-17 | End: 2019-11-18 | Stop reason: SDUPTHER

## 2019-10-17 NOTE — TELEPHONE ENCOUNTER
No future appointments. Last Appointment My Department:  5/22/2019    Please advise of refill below. Requested Prescriptions     Pending Prescriptions Disp Refills    gabapentin (NEURONTIN) 100 mg capsule 90 Cap 0     Sig: Take 1 Cap by mouth three (3) times daily.  Patient needs to call to make next available appointment for further refills

## 2019-10-17 NOTE — TELEPHONE ENCOUNTER
----- Message from Jazmin Clemens sent at 10/16/2019  4:19 PM EDT -----  Regarding: Dr. Guevara Hall  Pt requesting a refill for Rx \"Zabatentine 100 mg\" sent to [facility] pharmacy on file. Pt is completely out of this medication and requesting a refill last week. Best contact [phone].

## 2019-10-18 ENCOUNTER — DOCUMENTATION ONLY (OUTPATIENT)
Dept: NEUROLOGY | Age: 84
End: 2019-10-18

## 2019-11-18 ENCOUNTER — TELEPHONE (OUTPATIENT)
Dept: NEUROLOGY | Age: 84
End: 2019-11-18

## 2019-11-18 DIAGNOSIS — M47.22 CERVICAL RADICULOPATHY DUE TO OSTEOARTHRITIS OF SPINE: ICD-10-CM

## 2019-11-18 DIAGNOSIS — E11.42 DIABETIC PERIPHERAL NEUROPATHY ASSOCIATED WITH TYPE 2 DIABETES MELLITUS (HCC): ICD-10-CM

## 2019-11-18 DIAGNOSIS — E55.9 VITAMIN D DEFICIENCY: ICD-10-CM

## 2019-11-18 DIAGNOSIS — E53.8 B12 DEFICIENCY: ICD-10-CM

## 2019-11-18 DIAGNOSIS — G56.03 BILATERAL CARPAL TUNNEL SYNDROME: ICD-10-CM

## 2019-11-18 DIAGNOSIS — G60.8 IDIOPATHIC SMALL AND LARGE FIBER SENSORY NEUROPATHY: ICD-10-CM

## 2019-11-18 DIAGNOSIS — M54.16 LUMBAR BACK PAIN WITH RADICULOPATHY AFFECTING LEFT LOWER EXTREMITY: ICD-10-CM

## 2019-11-18 DIAGNOSIS — M54.16 LUMBAR BACK PAIN WITH RADICULOPATHY AFFECTING RIGHT LOWER EXTREMITY: ICD-10-CM

## 2019-11-18 RX ORDER — GABAPENTIN 100 MG/1
100 CAPSULE ORAL 3 TIMES DAILY
Qty: 90 CAP | Refills: 0 | Status: SHIPPED | OUTPATIENT
Start: 2019-11-18 | End: 2019-12-27 | Stop reason: SDUPTHER

## 2019-12-19 DIAGNOSIS — E55.9 VITAMIN D DEFICIENCY: ICD-10-CM

## 2019-12-19 DIAGNOSIS — E11.42 DIABETIC PERIPHERAL NEUROPATHY ASSOCIATED WITH TYPE 2 DIABETES MELLITUS (HCC): ICD-10-CM

## 2019-12-19 DIAGNOSIS — M54.16 LUMBAR BACK PAIN WITH RADICULOPATHY AFFECTING LEFT LOWER EXTREMITY: ICD-10-CM

## 2019-12-19 DIAGNOSIS — G60.8 IDIOPATHIC SMALL AND LARGE FIBER SENSORY NEUROPATHY: ICD-10-CM

## 2019-12-19 DIAGNOSIS — G56.03 BILATERAL CARPAL TUNNEL SYNDROME: ICD-10-CM

## 2019-12-19 DIAGNOSIS — M47.22 CERVICAL RADICULOPATHY DUE TO OSTEOARTHRITIS OF SPINE: ICD-10-CM

## 2019-12-19 DIAGNOSIS — E53.8 B12 DEFICIENCY: ICD-10-CM

## 2019-12-19 DIAGNOSIS — M54.16 LUMBAR BACK PAIN WITH RADICULOPATHY AFFECTING RIGHT LOWER EXTREMITY: ICD-10-CM

## 2019-12-19 RX ORDER — GABAPENTIN 100 MG/1
100 CAPSULE ORAL 3 TIMES DAILY
Qty: 90 CAP | Refills: 0 | OUTPATIENT
Start: 2019-12-19

## 2019-12-19 NOTE — TELEPHONE ENCOUNTER
Received a refill request from Gina Calderon for      Requested Prescriptions     Pending Prescriptions Disp Refills    gabapentin (NEURONTIN) 100 mg capsule 90 Cap 0     Sig: Take 1 Cap by mouth three (3) times daily.  Patient needs to call to make next available appointment for further refills     Future Appointments   Date Time Provider Laura Orr   6/2/2020 11:00 AM Veronica Ambriz MD [address] [place] [address]                         Last Appointment My Department:  5/22/2019      Please review

## 2019-12-26 DIAGNOSIS — E53.8 B12 DEFICIENCY: ICD-10-CM

## 2019-12-26 DIAGNOSIS — E11.42 DIABETIC PERIPHERAL NEUROPATHY ASSOCIATED WITH TYPE 2 DIABETES MELLITUS (HCC): ICD-10-CM

## 2019-12-26 DIAGNOSIS — M54.16 LUMBAR BACK PAIN WITH RADICULOPATHY AFFECTING RIGHT LOWER EXTREMITY: ICD-10-CM

## 2019-12-26 DIAGNOSIS — M47.22 CERVICAL RADICULOPATHY DUE TO OSTEOARTHRITIS OF SPINE: ICD-10-CM

## 2019-12-26 DIAGNOSIS — G56.03 BILATERAL CARPAL TUNNEL SYNDROME: ICD-10-CM

## 2019-12-26 DIAGNOSIS — M54.16 LUMBAR BACK PAIN WITH RADICULOPATHY AFFECTING LEFT LOWER EXTREMITY: ICD-10-CM

## 2019-12-26 DIAGNOSIS — G60.8 IDIOPATHIC SMALL AND LARGE FIBER SENSORY NEUROPATHY: ICD-10-CM

## 2019-12-26 DIAGNOSIS — E55.9 VITAMIN D DEFICIENCY: ICD-10-CM

## 2019-12-26 NOTE — TELEPHONE ENCOUNTER
Patients daughter called stating that pt is completely out of her gabapentin and has not have any since Tuesday.  Pt has an apptmt for 6/2 with

## 2019-12-27 RX ORDER — GABAPENTIN 100 MG/1
100 CAPSULE ORAL 3 TIMES DAILY
Qty: 90 CAP | Refills: 3 | Status: SHIPPED | OUTPATIENT
Start: 2019-12-27 | End: 2020-01-27 | Stop reason: ALTCHOICE

## 2020-01-27 ENCOUNTER — OFFICE VISIT (OUTPATIENT)
Dept: NEUROLOGY | Age: 85
End: 2020-01-27

## 2020-01-27 VITALS
DIASTOLIC BLOOD PRESSURE: 62 MMHG | RESPIRATION RATE: 16 BRPM | BODY MASS INDEX: 28.52 KG/M2 | SYSTOLIC BLOOD PRESSURE: 124 MMHG | WEIGHT: 155 LBS | OXYGEN SATURATION: 98 % | HEIGHT: 62 IN | HEART RATE: 78 BPM

## 2020-01-27 DIAGNOSIS — M54.16 LUMBAR BACK PAIN WITH RADICULOPATHY AFFECTING LEFT LOWER EXTREMITY: ICD-10-CM

## 2020-01-27 DIAGNOSIS — G56.03 BILATERAL CARPAL TUNNEL SYNDROME: ICD-10-CM

## 2020-01-27 DIAGNOSIS — G60.8 IDIOPATHIC SMALL AND LARGE FIBER SENSORY NEUROPATHY: ICD-10-CM

## 2020-01-27 DIAGNOSIS — Z96.652 STATUS POST TOTAL LEFT KNEE REPLACEMENT: ICD-10-CM

## 2020-01-27 DIAGNOSIS — M54.16 LUMBAR BACK PAIN WITH RADICULOPATHY AFFECTING RIGHT LOWER EXTREMITY: ICD-10-CM

## 2020-01-27 DIAGNOSIS — E11.42 DIABETIC PERIPHERAL NEUROPATHY ASSOCIATED WITH TYPE 2 DIABETES MELLITUS (HCC): Primary | ICD-10-CM

## 2020-01-27 DIAGNOSIS — M47.22 CERVICAL RADICULOPATHY DUE TO OSTEOARTHRITIS OF SPINE: ICD-10-CM

## 2020-01-27 RX ORDER — ALENDRONATE SODIUM 70 MG/1
70 TABLET ORAL
COMMUNITY
Start: 2019-07-08

## 2020-01-27 RX ORDER — GABAPENTIN 300 MG/1
300 CAPSULE ORAL 3 TIMES DAILY
Qty: 100 CAP | Refills: 5 | Status: SHIPPED | OUTPATIENT
Start: 2020-01-27 | End: 2020-07-16 | Stop reason: SDUPTHER

## 2020-01-27 RX ORDER — MULTIVIT WITH MINERALS/HERBS
1 TABLET ORAL DAILY
COMMUNITY

## 2020-01-27 NOTE — PATIENT INSTRUCTIONS
A Healthy Lifestyle: Care Instructions Your Care Instructions A healthy lifestyle can help you feel good, stay at a healthy weight, and have plenty of energy for both work and play. A healthy lifestyle is something you can share with your whole family. A healthy lifestyle also can lower your risk for serious health problems, such as high blood pressure, heart disease, and diabetes. You can follow a few steps listed below to improve your health and the health of your family. Follow-up care is a key part of your treatment and safety. Be sure to make and go to all appointments, and call your doctor if you are having problems. It's also a good idea to know your test results and keep a list of the medicines you take. How can you care for yourself at home? · Do not eat too much sugar, fat, or fast foods. You can still have dessert and treats now and then. The goal is moderation. · Start small to improve your eating habits. Pay attention to portion sizes, drink less juice and soda pop, and eat more fruits and vegetables. ? Eat a healthy amount of food. A 3-ounce serving of meat, for example, is about the size of a deck of cards. Fill the rest of your plate with vegetables and whole grains. ? Limit the amount of soda and sports drinks you have every day. Drink more water when you are thirsty. ? Eat at least 5 servings of fruits and vegetables every day. It may seem like a lot, but it is not hard to reach this goal. A serving or helping is 1 piece of fruit, 1 cup of vegetables, or 2 cups of leafy, raw vegetables. Have an apple or some carrot sticks as an afternoon snack instead of a candy bar. Try to have fruits and/or vegetables at every meal. 
· Make exercise part of your daily routine. You may want to start with simple activities, such as walking, bicycling, or slow swimming. Try to be active 30 to 60 minutes every day.  You do not need to do all 30 to 60 minutes all at once. For example, you can exercise 3 times a day for 10 or 20 minutes. Moderate exercise is safe for most people, but it is always a good idea to talk to your doctor before starting an exercise program. 
· Keep moving. Vivien Box the lawn, work in the garden, or [employer]. Take the stairs instead of the elevator at work. · If you smoke, quit. People who smoke have an increased risk for heart attack, stroke, cancer, and other lung illnesses. Quitting is hard, but there are ways to boost your chance of quitting tobacco for good. ? Use nicotine gum, patches, or lozenges. ? Ask your doctor about stop-smoking programs and medicines. ? Keep trying. In addition to reducing your risk of diseases in the future, you will notice some benefits soon after you stop using tobacco. If you have shortness of breath or asthma symptoms, they will likely get better within a few weeks after you quit. · Limit how much alcohol you drink. Moderate amounts of alcohol (up to 2 drinks a day for men, 1 drink a day for women) are okay. But drinking too much can lead to liver problems, high blood pressure, and other health problems. Family health If you have a family, there are many things you can do together to improve your health. · Eat meals together as a family as often as possible. · Eat healthy foods. This includes fruits, vegetables, lean meats and dairy, and whole grains. · Include your family in your fitness plan. Most people think of activities such as jogging or tennis as the way to fitness, but there are many ways you and your family can be more active. Anything that makes you breathe hard and gets your heart pumping is exercise. Here are some tips: 
? Walk to do errands or to take your child to school or the bus. 
? Go for a family bike ride after dinner instead of watching TV. Where can you learn more? Go to http://anna-carlos.info/. Enter O463 in the search box to learn more about \"A Healthy Lifestyle: Care Instructions. \" Current as of: May 28, 2019 Content Version: 12.2 © 7787-3945 [employer]. Care instructions adapted under license by [employer] (which disclaims liability or warranty for this information). If you have questions about a medical condition or this instruction, always ask your healthcare professional. [address] any warranty or liability for your use of this information. Office Policies 
 
o Phone calls/patient messages: Please allow up to 24 hours for someone in the office to contact you about your call or message. Be mindful your provider may be out of the office or your message may require further review. We encourage you to use readfy for your messages as this is a faster, more efficient way to communicate with our office 
 
o Medication Refills: 
Prescription medications require up to 48 business hours to process. We encourage you to use readfy for your refills. For controlled medications: Please allow up to 72 business hours to process. Certain medications may require you to  a written prescription at our office. NO narcotic/controlled medications will be prescribed after 4pm Monday through Friday or on weekends 
 
o Form/Paperwork Completion: We ask that you allow 7-14 business days. You may also download your forms to readfy to have your doctor print off.

## 2020-01-27 NOTE — LETTER
1/27/20 Patient: Patricia De Oliveira YOB: 1932 Date of Visit: 1/27/2020 Edu Durand MD 
[facility] [address] [address] VIA In Basket Dear Edu Durand MD, Thank you for referring Ms. Patricia De Oliveira to [address] [place] [address] for evaluation. My notes for this consultation are attached. Consult REFERRED BY: 
Gregory Sapp MD 
 
CHIEF COMPLAINT: Numbness in hands and feet Subjective:  
 
Patricia De Oliveira is a 80 y.o. right-handed -American female seen today at the request of Dr. Nitin Jeffers for evaluation of a new progressive problem of increasing pain in her feet when she first gets up in the morning, despite being on gabapentin 100 mg 3 times a day, but does not feel that the numbness has progressed that much just the pain. Patient has a known diabetic neuropathy from latent diabetes, but she is off her diabetic medicines and her hemoglobin A1c or blood sugars released seem to be better controlled. There has been high before. She is not taking vitamins at the present time and I encourage her to take a regular 1 a day vitamin of B complex type every day to help the nerves. Patient does have some numbness in her hands and more in the feet. Patient has been last seen by me almost 3 years ago, but did see my associate 8 months ago. No changes made in her treatment. She has had no other new neurological symptoms. She has no family history of diabetes and no prior history of diabetes. She says the numbness seems to be worse in the left hand involving her fourth and fifth finger, and in both feet mainly on the bottom of the feet and more in the little toe in the right foot and the big toe and the left foot. She frequently crosses her legs, and we advised her to try to avoid this.   It is associated with a somewhat burning pain on the bottom of her feet at times, but she sleeps pretty well.  She does not really have that much back pain but does have some neck pain that seems to radiate more to the left side and seems to have some fullness in the left supraclavicular area. She has had no recent trauma, fever, meningismus or evidence of any causes for the numbness. She has no toxin exposure, chemical exposure, or family history of similar problems. Her bowel and bladder function remained stable. She has had no cranial nerve symptoms and no major headaches. She continues to remain mentally and physically active but does not take any vitamins. She eats well and has good nutrition and tries to remain mentally and physically active. She has no major gait problems or balance problems except for the arthritis in her knees bilaterally, with the right having previous knee surgery one year ago. Past Medical History:  
Diagnosis Date  Arthritis  Chronic pain   
 knee  Diabetes ([address]  GERD (gastroesophageal reflux disease)  Hypertension  Ill-defined condition   
 seaonal allergies  Psychiatric disorder   
 anxiety Past Surgical History:  
Procedure Laterality Date  HX CHOLECYSTECTOMY  HX ÓSCAR AND BSO  AK COLONOSCOPY FLX DX W/COLLJ SPEC WHEN PFRMD  11/21/2013  TOTAL KNEE ARTHROPLASTY  2016  
 right  UPPER GI ENDOSCOPY,BIOPSY  11/3/2015 Family History Problem Relation Age of Onset  No Known Problems Mother  No Known Problems Father  Heart Disease Child  Arthritis-osteo Child  Hypertension Child Social History Tobacco Use  Smoking status: Never Smoker  Smokeless tobacco: Never Used Substance Use Topics  Alcohol use: Yes Comment: social  
   
 
Current Outpatient Medications:  
  alendronate (FOSAMAX) 70 mg tablet, Take 70 mg by mouth every seven (7) days. , Disp: , Rfl:  
  gabapentin (NEURONTIN) 300 mg capsule, Take 1 Cap by mouth three (3) times daily. Max Daily Amount: 900 mg., Disp: 100 Cap, Rfl: 5 
  b complex vitamins tablet, Take 1 Tab by mouth daily. , Disp: , Rfl:  
  cholecalciferol, VITAMIN D3, (VITAMIN D3) 5,000 unit tab tablet, Take 5,000 Units by mouth daily. , Disp: , Rfl:  
  POTASSIUM PO, Take 10 mEq by mouth daily. potassium cl 10 mEq, Disp: , Rfl:  
  cholecalciferol, vitamin D3, (VITAMIN D3 PO), Take  by mouth daily. , Disp: , Rfl:  
  cetirizine (ZYRTEC) 10 mg tablet, Take  by mouth as needed for Allergies. , Disp: , Rfl:  
  hydrochlorothiazide (HYDRODIURIL) 25 mg tablet, Take 1 Tab by mouth daily. Restart when Blood Pressure is greater than 120/80, Disp: , Rfl:  
  omeprazole (PRILOSEC) 20 mg capsule, Take 20 mg by mouth daily. , Disp: , Rfl:  
  citalopram (CELEXA) 20 mg tablet, Take 20 mg by mouth daily. , Disp: , Rfl:  
  atorvastatin (LIPITOR) 20 mg tablet, Take 20 mg by mouth daily. , Disp: , Rfl:  
 
 
 
Allergies Allergen Reactions  Codeine Other (comments)  
  fainting Review of Systems: A comprehensive review of systems was negative except for: Constitutional: positive for fatigue and malaise Ears, nose, mouth, throat, and face: positive for hearing loss Musculoskeletal: positive for myalgias, arthralgias, stiff joints and neck pain Neurological: positive for paresthesia and gait problems Behvioral/Psych: positive for depression Vitals:  
 01/27/20 1443 BP: 124/62 Pulse: 78 Resp: 16 SpO2: 98% Weight: 155 lb (70.3 kg) Height: 5' 2\" (1.575 m) Objective: I 
 
 
NEUROLOGICAL EXAM: 
 
Appearance: The patient is well developed, well nourished, provides a coherent history and is in no acute distress. Mental Status: Oriented to time, place and person, and the president, cognitive function is normal and speech is fluent and no aphasia or dysarthria. Mood and affect appropriate but seems mildly depressed . Cranial Nerves:   Intact visual fields. Fundi are benign, but poorly seen. BEKAH, EOM's full, no nystagmus, no ptosis. Facial sensation is normal. Corneal reflexes are not tested. Facial movement is symmetric. Hearing is abnormal bilaterally. Palate is midline with normal sternocleidomastoid and trapezius muscles are normal. Tongue is midline. Neck without meningismus or bruits Patient has mild pain on range of motion of the neck, slightly more rotation to the left Patient seems to have a slight fullness in the supraclavicular region on the left side, questionably some soft tissue swelling or lipoma Patient's temporal arteries are not tender or enlarged Patient's TMJ areas are not tender or enlarged Motor:  5/5 strength in upper and lower proximal and distal muscles. Normal bulk and tone. No fasciculations. Rapid alternating movement is slightly slow bilaterally. Reflexes:   Deep tendon reflexes 1+/4 and symmetrical, with absent ankle jerks bilaterally. No babinski or clonus present No clear Tinel's over the median nerves bilaterally or peroneal nerves at the fibular heads Sensory:   Abnormal to touch, pinprick and vibration and temperature in both feet to ankle level. DSS is intact Gait:  Normal gait except patient walks with a mild limp due to arthritis in her knees and previous right knee surgery . Tremor:   No tremor noted. Cerebellar:  Mildly abnormal Romberg and tandem cerebellar signs present. Neurovascular:  Normal heart sounds and regular rhythm, peripheral pulses decreased, and no carotid bruits. Assessment: ICD-10-CM ICD-9-CM 1. Diabetic peripheral neuropathy associated with type 2 diabetes mellitus (HCC) E11.42 250.60 gabapentin (NEURONTIN) 300 mg capsule  
  357.2 2. Idiopathic small and large fiber sensory neuropathy G60.8 356.4 gabapentin (NEURONTIN) 300 mg capsule 3. Bilateral carpal tunnel syndrome G56.03 354.0 gabapentin (NEURONTIN) 300 mg capsule 4.  Cervical radiculopathy due to osteoarthritis of spine M47.22 721.0 gabapentin (NEURONTIN) 300 mg capsule 5. Lumbar back pain with radiculopathy affecting left lower extremity M54.16 724.4 gabapentin (NEURONTIN) 300 mg capsule 6. Lumbar back pain with radiculopathy affecting right lower extremity M54.16 724.4 gabapentin (NEURONTIN) 300 mg capsule 7. Status post total left knee replacement Z96.652 V43.65 gabapentin (NEURONTIN) 300 mg capsule Plan:  
 
Patient with presumed mild neuropathy secondary to her latent diabetes, slightly more painful, will try to increase the gabapentin to 300 mg 1-3 times a day as tolerated, patient advised the side effect as far as drowsiness sedation dizziness or any side effect to call us immediately and stop the medicine We also encourage her to take a multivitamin every day and try to exercise regularly and stay mentally and physically active Patient had an EMG study 3 years ago that did document a very mild distal axonal neuropathy that would be consistent probably with her history of diabetes, need to rule out other causes. Patient had complete metabolic studies done today to rule out other treatable causes of neuropathy Patient's EMG study did not document carpal tunnel syndrome except on the right side which was minimal 
No clear evidence of motor cervical radiculopathy, and patient encouraged not to cross her legs. Patient encouraged to take a multivitamin every day and vitamin D, remain mentally and physically active Patient will get cervical spine x-rays and lumbar spine x-rays to rule out other treatable causes of her symptoms. We will see her back again in 6 months time or earlier as needed and patient was advised of the side effects of Neurontin as far as dizziness, sedation, or any side effects to call us immediately. Her condition also discussed with her daughter in detail today. We did advise her at the single most important thing for controlling her neuropathy was to control her blood sugars. Signed By: Patrick Bernabe MD   
 January 27, 2020 CC: Jennifer Plasencia MD 
FAX: [phone] This note will not be viewable in [address] If you have questions, please do not hesitate to call me. I look forward to following your patient along with you. Sincerely, Patrick Bernabe MD

## 2020-01-27 NOTE — PROGRESS NOTES
Consult  REFERRED BY:  Salima Eaton MD    CHIEF COMPLAINT: Numbness in hands and feet      Subjective:     Lakisha Redman is a 80 y.o. right-handed -American female seen today at the request of Dr. Maris Wilcox for evaluation of a new progressive problem of increasing pain in her feet when she first gets up in the morning, despite being on gabapentin 100 mg 3 times a day, but does not feel that the numbness has progressed that much just the pain. Patient has a known diabetic neuropathy from latent diabetes, but she is off her diabetic medicines and her hemoglobin A1c or blood sugars released seem to be better controlled. There has been high before. She is not taking vitamins at the present time and I encourage her to take a regular 1 a day vitamin of B complex type every day to help the nerves. Patient does have some numbness in her hands and more in the feet. Patient has been last seen by me almost 3 years ago, but did see my associate 8 months ago. No changes made in her treatment. She has had no other new neurological symptoms. She has no family history of diabetes and no prior history of diabetes. She says the numbness seems to be worse in the left hand involving her fourth and fifth finger, and in both feet mainly on the bottom of the feet and more in the little toe in the right foot and the big toe and the left foot. She frequently crosses her legs, and we advised her to try to avoid this. It is associated with a somewhat burning pain on the bottom of her feet at times, but she sleeps pretty well. She does not really have that much back pain but does have some neck pain that seems to radiate more to the left side and seems to have some fullness in the left supraclavicular area. She has had no recent trauma, fever, meningismus or evidence of any causes for the numbness. She has no toxin exposure, chemical exposure, or family history of similar problems.   Her bowel and bladder function remained stable. She has had no cranial nerve symptoms and no major headaches. She continues to remain mentally and physically active but does not take any vitamins. She eats well and has good nutrition and tries to remain mentally and physically active. She has no major gait problems or balance problems except for the arthritis in her knees bilaterally, with the right having previous knee surgery one year ago. Past Medical History:   Diagnosis Date    Arthritis     Chronic pain     knee    Diabetes ([address]     GERD (gastroesophageal reflux disease)     Hypertension     Ill-defined condition     seaonal allergies    Psychiatric disorder     anxiety      Past Surgical History:   Procedure Laterality Date    HX CHOLECYSTECTOMY      HX ÓSCAR AND BSO      MS COLONOSCOPY FLX DX W/COLLJ SPEC WHEN PFRMD  11/21/2013         TOTAL KNEE ARTHROPLASTY  2016    right    UPPER GI ENDOSCOPY,BIOPSY  11/3/2015          Family History   Problem Relation Age of Onset    No Known Problems Mother     No Known Problems Father     Heart Disease Child     Arthritis-osteo Child     Hypertension Child       Social History     Tobacco Use    Smoking status: Never Smoker    Smokeless tobacco: Never Used   Substance Use Topics    Alcohol use: Yes     Comment: social         Current Outpatient Medications:     alendronate (FOSAMAX) 70 mg tablet, Take 70 mg by mouth every seven (7) days. , Disp: , Rfl:     gabapentin (NEURONTIN) 300 mg capsule, Take 1 Cap by mouth three (3) times daily. Max Daily Amount: 900 mg., Disp: 100 Cap, Rfl: 5    b complex vitamins tablet, Take 1 Tab by mouth daily. , Disp: , Rfl:     cholecalciferol, VITAMIN D3, (VITAMIN D3) 5,000 unit tab tablet, Take 5,000 Units by mouth daily. , Disp: , Rfl:     POTASSIUM PO, Take 10 mEq by mouth daily. potassium cl 10 mEq, Disp: , Rfl:     cholecalciferol, vitamin D3, (VITAMIN D3 PO), Take  by mouth daily. , Disp: , Rfl:     cetirizine (ZYRTEC) 10 mg tablet, Take  by mouth as needed for Allergies. , Disp: , Rfl:     hydrochlorothiazide (HYDRODIURIL) 25 mg tablet, Take 1 Tab by mouth daily. Restart when Blood Pressure is greater than 120/80, Disp: , Rfl:     omeprazole (PRILOSEC) 20 mg capsule, Take 20 mg by mouth daily. , Disp: , Rfl:     citalopram (CELEXA) 20 mg tablet, Take 20 mg by mouth daily. , Disp: , Rfl:     atorvastatin (LIPITOR) 20 mg tablet, Take 20 mg by mouth daily. , Disp: , Rfl:         Allergies   Allergen Reactions    Codeine Other (comments)     fainting        Review of Systems:  A comprehensive review of systems was negative except for: Constitutional: positive for fatigue and malaise  Ears, nose, mouth, throat, and face: positive for hearing loss  Musculoskeletal: positive for myalgias, arthralgias, stiff joints and neck pain  Neurological: positive for paresthesia and gait problems  Behvioral/Psych: positive for depression   Vitals:    01/27/20 1443   BP: 124/62   Pulse: 78   Resp: 16   SpO2: 98%   Weight: 155 lb (70.3 kg)   Height: 5' 2\" (1.575 m)     Objective:     I      NEUROLOGICAL EXAM:    Appearance: The patient is well developed, well nourished, provides a coherent history and is in no acute distress. Mental Status: Oriented to time, place and person, and the president, cognitive function is normal and speech is fluent and no aphasia or dysarthria. Mood and affect appropriate but seems mildly depressed . Cranial Nerves:   Intact visual fields. Fundi are benign, but poorly seen. BEKAH, EOM's full, no nystagmus, no ptosis. Facial sensation is normal. Corneal reflexes are not tested. Facial movement is symmetric. Hearing is abnormal bilaterally. Palate is midline with normal sternocleidomastoid and trapezius muscles are normal. Tongue is midline.   Neck without meningismus or bruits  Patient has mild pain on range of motion of the neck, slightly more rotation to the left  Patient seems to have a slight fullness in the supraclavicular region on the left side, questionably some soft tissue swelling or lipoma   Patient's temporal arteries are not tender or enlarged  Patient's TMJ areas are not tender or enlarged    Motor:  5/5 strength in upper and lower proximal and distal muscles. Normal bulk and tone. No fasciculations. Rapid alternating movement is slightly slow bilaterally. Reflexes:   Deep tendon reflexes 1+/4 and symmetrical, with absent ankle jerks bilaterally. No babinski or clonus present  No clear Tinel's over the median nerves bilaterally or peroneal nerves at the fibular heads    Sensory:   Abnormal to touch, pinprick and vibration and temperature in both feet to ankle level. DSS is intact   Gait:  Normal gait except patient walks with a mild limp due to arthritis in her knees and previous right knee surgery . Tremor:   No tremor noted. Cerebellar:  Mildly abnormal Romberg and tandem cerebellar signs present. Neurovascular:  Normal heart sounds and regular rhythm, peripheral pulses decreased, and no carotid bruits. Assessment:       ICD-10-CM ICD-9-CM    1. Diabetic peripheral neuropathy associated with type 2 diabetes mellitus (HCC) E11.42 250.60 gabapentin (NEURONTIN) 300 mg capsule     357.2    2. Idiopathic small and large fiber sensory neuropathy G60.8 356.4 gabapentin (NEURONTIN) 300 mg capsule   3. Bilateral carpal tunnel syndrome G56.03 354.0 gabapentin (NEURONTIN) 300 mg capsule   4. Cervical radiculopathy due to osteoarthritis of spine M47.22 721.0 gabapentin (NEURONTIN) 300 mg capsule   5. Lumbar back pain with radiculopathy affecting left lower extremity M54.16 724.4 gabapentin (NEURONTIN) 300 mg capsule   6. Lumbar back pain with radiculopathy affecting right lower extremity M54.16 724.4 gabapentin (NEURONTIN) 300 mg capsule   7.  Status post total left knee replacement Z96.652 V43.65 gabapentin (NEURONTIN) 300 mg capsule         Plan:     Patient with presumed mild neuropathy secondary to her latent diabetes, slightly more painful, will try to increase the gabapentin to 300 mg 1-3 times a day as tolerated, patient advised the side effect as far as drowsiness sedation dizziness or any side effect to call us immediately and stop the medicine  We also encourage her to take a multivitamin every day and try to exercise regularly and stay mentally and physically active  Patient had an EMG study 3 years ago that did document a very mild distal axonal neuropathy that would be consistent probably with her history of diabetes, need to rule out other causes. Patient had complete metabolic studies done today to rule out other treatable causes of neuropathy  Patient's EMG study did not document carpal tunnel syndrome except on the right side which was minimal  No clear evidence of motor cervical radiculopathy, and patient encouraged not to cross her legs. Patient encouraged to take a multivitamin every day and vitamin D, remain mentally and physically active  Patient will get cervical spine x-rays and lumbar spine x-rays to rule out other treatable causes of her symptoms. We will see her back again in 6 months time or earlier as needed and patient was advised of the side effects of Neurontin as far as dizziness, sedation, or any side effects to call us immediately. Her condition also discussed with her daughter in detail today. We did advise her at the single most important thing for controlling her neuropathy was to control her blood sugars. Signed By: Bud Noel MD     January 27, 2020       CC: Lidya Connolly MD  FAX: [phone]    This note will not be viewable in [address]

## 2020-02-11 ENCOUNTER — HOSPITAL ENCOUNTER (OUTPATIENT)
Dept: MAMMOGRAPHY | Age: 85
Discharge: HOME | End: 2020-02-11
Attending: FAMILY MEDICINE
Payer: MEDICARE

## 2020-02-11 DIAGNOSIS — Z12.31 VISIT FOR SCREENING MAMMOGRAM: ICD-10-CM

## 2020-02-11 PROCEDURE — 77067 SCR MAMMO BI INCL CAD: CPT

## 2020-07-16 DIAGNOSIS — G56.03 BILATERAL CARPAL TUNNEL SYNDROME: ICD-10-CM

## 2020-07-16 DIAGNOSIS — M54.16 LUMBAR BACK PAIN WITH RADICULOPATHY AFFECTING RIGHT LOWER EXTREMITY: ICD-10-CM

## 2020-07-16 DIAGNOSIS — M47.22 CERVICAL RADICULOPATHY DUE TO OSTEOARTHRITIS OF SPINE: ICD-10-CM

## 2020-07-16 DIAGNOSIS — G60.8 IDIOPATHIC SMALL AND LARGE FIBER SENSORY NEUROPATHY: ICD-10-CM

## 2020-07-16 DIAGNOSIS — E11.42 DIABETIC PERIPHERAL NEUROPATHY ASSOCIATED WITH TYPE 2 DIABETES MELLITUS (HCC): ICD-10-CM

## 2020-07-16 DIAGNOSIS — Z96.652 STATUS POST TOTAL LEFT KNEE REPLACEMENT: ICD-10-CM

## 2020-07-16 DIAGNOSIS — M54.16 LUMBAR BACK PAIN WITH RADICULOPATHY AFFECTING LEFT LOWER EXTREMITY: ICD-10-CM

## 2020-07-16 RX ORDER — GABAPENTIN 300 MG/1
300 CAPSULE ORAL 3 TIMES DAILY
Qty: 100 CAP | Refills: 5 | Status: SHIPPED | OUTPATIENT
Start: 2020-07-16 | End: 2020-07-24 | Stop reason: DRUGHIGH

## 2020-07-16 NOTE — TELEPHONE ENCOUNTER
No future appointments. Last Appointment My Department:  7/15/2020    Please review and send in refill below if warranted. Requested Prescriptions     Pending Prescriptions Disp Refills    gabapentin (NEURONTIN) 300 mg capsule 100 Cap 5     Sig: Take 1 Cap by mouth three (3) times daily. Max Daily Amount: 900 mg.

## 2020-07-24 ENCOUNTER — OFFICE VISIT (OUTPATIENT)
Dept: NEUROLOGY | Age: 85
End: 2020-07-24

## 2020-07-24 VITALS — WEIGHT: 155 LBS | BODY MASS INDEX: 28.52 KG/M2 | HEIGHT: 62 IN

## 2020-07-24 DIAGNOSIS — M47.22 OSTEOARTHRITIS OF SPINE WITH RADICULOPATHY, CERVICAL REGION: ICD-10-CM

## 2020-07-24 DIAGNOSIS — M79.673 PAIN OF FOOT, UNSPECIFIED LATERALITY: ICD-10-CM

## 2020-07-24 DIAGNOSIS — M47.26 OSTEOARTHRITIS OF SPINE WITH RADICULOPATHY, LUMBAR REGION: ICD-10-CM

## 2020-07-24 DIAGNOSIS — E11.42 DIABETIC PERIPHERAL NEUROPATHY ASSOCIATED WITH TYPE 2 DIABETES MELLITUS (HCC): Primary | ICD-10-CM

## 2020-07-24 DIAGNOSIS — G56.03 BILATERAL CARPAL TUNNEL SYNDROME: ICD-10-CM

## 2020-07-24 PROBLEM — M54.50 LOW BACK PAIN: Status: ACTIVE | Noted: 2017-05-15

## 2020-07-24 RX ORDER — TIZANIDINE 2 MG/1
2 TABLET ORAL
COMMUNITY
Start: 2020-07-16

## 2020-07-24 RX ORDER — METFORMIN HYDROCHLORIDE 500 MG/1
500 TABLET, EXTENDED RELEASE ORAL
COMMUNITY
Start: 2020-07-14

## 2020-07-24 RX ORDER — FLASH GLUCOSE SCANNING READER
EACH MISCELLANEOUS
COMMUNITY
Start: 2020-05-19

## 2020-07-24 RX ORDER — POTASSIUM CHLORIDE 750 MG/1
10 TABLET, FILM COATED, EXTENDED RELEASE ORAL DAILY
COMMUNITY
Start: 2020-06-18

## 2020-07-24 RX ORDER — FAMOTIDINE 20 MG/1
TABLET, FILM COATED ORAL
COMMUNITY
Start: 2020-07-02 | End: 2020-07-24

## 2020-07-24 RX ORDER — GABAPENTIN 300 MG/1
300 CAPSULE ORAL 4 TIMES DAILY
Qty: 360 CAP | Refills: 3 | Status: SHIPPED | OUTPATIENT
Start: 2020-07-24

## 2020-07-24 NOTE — PROGRESS NOTES
Rooney Canavan is a 80 y.o. female who presents today for the following:  Chief Complaint   Patient presents with    Follow-up     neuropathy         HPI  Historical Data    Patient has been previously seen in the practice and is generally followed by Dr. Oziel Hinkle     Neurologic diagnosis:  Diabetic peripheral neuropathy  Cervical spine degenerative disease  Lumbar spine degenerative disease      Interim Data:     Spoke w the patient and her daughter Nery Baird    Patient is maintained on gabapentin 300 mg 1 tablet 3 times a day [8- 12- 6] Pt goes to bed 11PM    Feet still tingling and difficult to walk in AM but then ok the rest of the day    BS:     Patient states there were 2 spots on her foot when she presses on them they hurt    She tells me her hands are feeling pretty good she is not having any numbness or pain              Allergies   Allergen Reactions    Codeine Other (comments)     fainting       Current Outpatient Medications   Medication Sig    FreeStyle Michael 14 Day [place] misc USE AS DIRECTED QD    metFORMIN ER (GLUCOPHAGE XR) 500 mg tablet     potassium chloride SR (KLOR-CON 10) 10 mEq tablet TK 1 T PO QD    tiZANidine (ZANAFLEX) 2 mg tablet TK 1 T PO QHS PRN    gabapentin (NEURONTIN) 300 mg capsule Take 1 Cap by mouth four (4) times daily. Max Daily Amount: 1,200 mg. Indications: neuropathic pain    alendronate (FOSAMAX) 70 mg tablet Take 70 mg by mouth every seven (7) days.  b complex vitamins tablet Take 1 Tab by mouth daily.  cholecalciferol, VITAMIN D3, (VITAMIN D3) 5,000 unit tab tablet Take 5,000 Units by mouth daily.  cetirizine (ZYRTEC) 10 mg tablet Take  by mouth as needed for Allergies.  hydrochlorothiazide (HYDRODIURIL) 25 mg tablet Take 1 Tab by mouth daily. Restart when Blood Pressure is greater than 120/80    omeprazole (PRILOSEC) 20 mg capsule Take 20 mg by mouth daily.  citalopram (CELEXA) 20 mg tablet Take 20 mg by mouth daily.     atorvastatin (LIPITOR) 20 mg tablet Take 20 mg by mouth daily. No current facility-administered medications for this visit. Past Surgical History:   Procedure Laterality Date    HX CHOLECYSTECTOMY      HX ÓSCAR AND BSO      AK COLONOSCOPY FLX DX W/COLLJ SPEC WHEN PFRMD  11/21/2013         TOTAL KNEE ARTHROPLASTY  2016    right    UPPER GI ENDOSCOPY,BIOPSY  11/3/2015            Family History   Problem Relation Age of Onset    No Known Problems Mother     No Known Problems Father     Heart Disease Child     Arthritis-osteo Child     Hypertension Child        Social History     Socioeconomic History    Marital status: UNKNOWN     Spouse name: Not on file    Number of children: Not on file    Years of education: Not on file    Highest education level: Not on file   Tobacco Use    Smoking status: Never Smoker    Smokeless tobacco: Never Used   Substance and Sexual Activity    Alcohol use: Yes     Comment: social    Drug use: No         ROS    A ten system review of constitutional, cardiovascular, respiratory, musculoskeletal, endocrine, skin, SHEENT, genitourinary, psychiatric and neurologic systems was obtained and is unremarkable with the except as stated under HPI      EXAMINATION: Within the context and limitations of a telephone encounter      General appearance: Not testable    Psych/mental health:  Affect: Appropriate    PHQ  No flowsheet data found. HEENT: Not testable    Cardiovascular: Not testable    Respiratory: No dyspnea, wheezes or stridors audible through conversation    Musculoskeletal: Not testable    Integumentary: Not testable      Neurological Examination:   Mental Status:        MMSE  No flowsheet data found. Formal testing was not completed    there was nothing concerning on general  discussion.    Alert and appropriate to general conversation  Normal processing on general observation  Followed conversation and responded seemingly appropriate throughout the visit  No word finding difficulties noted on casual observation      Cranial Nerves:    Hearing intact to conversation  Voice with normal projection  Otherwise not testable      Motor: Not testable    Sensation: Not testable    Coordination/Cerebellar:   Not testable    Gait: Not testable    Fall risk assessment  Fall Risk Assessment, last 12 mths 7/24/2020   Able to walk? Yes   Fall in past 12 months? No   Fall with injury? No       Reflexes: Not testable    ASSESSMENT AND PLAN  Diabetic peripheral neuropathy: Most bothersome time is first thing in the morning when the gabapentin is clearly out of her system so were can increase her dose to 300 mg 1 tablet 4 times a day 812 6 and bedtime. A new prescription has been sent to her pharmacy for 90-day supply with refills    Suspected carpal tunnel syndrome: Presently not bothersome gabapentin is probably effective    Degenerative cervical spine and lumbar spine disease: Presently does not seem to be bothered by this    Complaints of foot pain: This is point specific probably related to arthritis have asked her to follow-up with primary care regarding this and if need be referral to podiatry            ICD-10-CM ICD-9-CM    1. Diabetic peripheral neuropathy associated with type 2 diabetes mellitus (HCC)  E11.42 250.60 gabapentin (NEURONTIN) 300 mg capsule     357.2    2. Osteoarthritis of spine with radiculopathy, cervical region  M47.22 721.0    3. Osteoarthritis of spine with radiculopathy, lumbar region  M47.26 721.3    4. Pain of foot, unspecified laterality  M79.673 729.5    5. Bilateral carpal tunnel syndrome  G56.03 354.0            Additional pertinent medical data reviewed at today's office visit:       No visits with results within 3 Month(s) from this visit.    Latest known visit with results is:   Admission on 08/21/2018, Discharged on 08/22/2018   Component Date Value    Glucose (POC) 08/21/2018 107*    Performed by 08/21/2018 Ghazal Mon     Glucose (POC) 08/21/2018 157*    Performed by 08/21/2018 Beatris Albright     Glucose (POC) 08/21/2018 146*    Performed by 08/21/2018 Taylor Math \"Jolanta\"     Glucose (POC) 08/21/2018 170*    Performed by 08/21/2018 Gulshna Burner     Sodium 08/22/2018 137     Potassium 08/22/2018 3.7     Chloride 08/22/2018 104     CO2 08/22/2018 27     Anion gap 08/22/2018 6     Glucose 08/22/2018 141*    BUN 08/22/2018 13     Creatinine 08/22/2018 0.72     BUN/Creatinine ratio 08/22/2018 18     GFR est AA 08/22/2018 >60     GFR est non-AA 08/22/2018 >60     Calcium 08/22/2018 8.9     HGB 08/22/2018 12.0     Glucose (POC) 08/22/2018 170*    Performed by 08/22/2018 Salo Mae (PCT)     Glucose (POC) 08/22/2018 182*    Performed by 08/22/2018 Taylor Math \"Jolanta\"        XR Results (maximum last 3): Results from [place] encounter on 05/15/17   XR SPINE LUMB MIN 4 V    Impression IMPRESSION:  Osteopenia, L2 mild vertebral compression fracture, and  degenerative findings again shown. XR SPINE CERV W OBL/FLEX/EXT MIN 6 V COMP    Impression IMPRESSION: Osteopenia and degenerative changes again shown. Results from [place] encounter on 08/02/16   XR CHEST PA LAT       CT Results (maximum last 3): Results from [place] encounter on 08/15/19   CT CHEST WO CONT    Impression IMPRESSION:   1. No acute abnormality. 2. Atherosclerotic aorta with coronary artery calcification. 3. Mild thoracic spondylosis. 4. Calcified subcarinal nodes indicative of old granulomatous disease. 6. Status post cholecystectomy. Results from [place] encounter on 06/13/14   CT HEAD WO CONT   Results from Hospital Encounter encounter on 08/15/12   CT ABD PELV W CONT       MRI Results (maximum last 3): Results from [place] encounter on 06/29/16   MRI KNEE RT W  WO CONT       This is a telephone appointment.   Review of data with patient and her daughter along with updated information: Appointment was greater than 20 minutes      Janay Davis MS, ANP-BC, [facility]

## 2020-07-24 NOTE — PATIENT INSTRUCTIONS
We can increase the gabapentin using the 300 mg dose to 1 tablet 4 times a day. 8 AM 12 noon6 PMbedtime  By adding the bedtime dose hopefully this will help with the first thing in the morning pain on your feet    Regarding the point specific pain on your foot when you press on it it is most likely arthritis but please follow-up with primary care and possibly a podiatry appointment but will defer to primary care on this    Continue to keep your blood sugars under good control    We will have you follow-up by telephone visit in 4 months but were available sooner if needed    I highly encourage you to get connected to my chart. Right now with all the virtual visits and telephone visits it is very hard to get through on her phone lines and this will be the easiest way to get connected.   I appreciate that you do not have a smart phone or an email but your daughter can get this set up for you on her computer

## 2020-09-07 ENCOUNTER — APPOINTMENT (OUTPATIENT)
Dept: GENERAL RADIOLOGY | Age: 85
End: 2020-09-07
Attending: EMERGENCY MEDICINE
Payer: MEDICARE

## 2020-09-07 ENCOUNTER — HOSPITAL ENCOUNTER (EMERGENCY)
Age: 85
Discharge: HOME | End: 2020-09-07
Attending: EMERGENCY MEDICINE
Payer: MEDICARE

## 2020-09-07 ENCOUNTER — APPOINTMENT (OUTPATIENT)
Dept: CT IMAGING | Age: 85
End: 2020-09-07
Attending: EMERGENCY MEDICINE
Payer: MEDICARE

## 2020-09-07 VITALS
RESPIRATION RATE: 16 BRPM | SYSTOLIC BLOOD PRESSURE: 149 MMHG | HEART RATE: 87 BPM | TEMPERATURE: 98.5 F | DIASTOLIC BLOOD PRESSURE: 71 MMHG | HEIGHT: 62 IN | WEIGHT: 157.41 LBS | OXYGEN SATURATION: 99 % | BODY MASS INDEX: 28.97 KG/M2
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
## 2020-09-07 DIAGNOSIS — R55 VASOVAGAL SYNCOPE: Primary | ICD-10-CM

## 2020-09-07 DIAGNOSIS — S41.111A LACERATION OF RIGHT UPPER EXTREMITY, INITIAL ENCOUNTER: ICD-10-CM

## 2020-09-07 DIAGNOSIS — I45.10 RIGHT BUNDLE BRANCH BLOCK: ICD-10-CM

## 2020-09-07 DIAGNOSIS — R74.01 TRANSAMINITIS: ICD-10-CM

## 2020-09-07 LAB
ALBUMIN SERPL-MCNC: 3.6 G/DL (ref 3.5–5)
ALBUMIN/GLOB SERPL: 0.7 {RATIO} (ref 1.1–2.2)
ALP SERPL-CCNC: 73 U/L (ref 45–117)
ALT SERPL-CCNC: 84 U/L (ref 12–78)
ANION GAP SERPL CALC-SCNC: 4 MMOL/L (ref 5–15)
AST SERPL-CCNC: 62 U/L (ref 15–37)
BASOPHILS # BLD: 0 K/UL (ref 0–0.1)
BASOPHILS NFR BLD: 0 % (ref 0–1)
BILIRUB SERPL-MCNC: 0.4 MG/DL (ref 0.2–1)
BUN SERPL-MCNC: 23 MG/DL (ref 6–20)
BUN/CREAT SERPL: 25 (ref 12–20)
CALCIUM SERPL-MCNC: 9.2 MG/DL (ref 8.5–10.1)
CHLORIDE SERPL-SCNC: 106 MMOL/L (ref 97–108)
CO2 SERPL-SCNC: 29 MMOL/L (ref 21–32)
CREAT SERPL-MCNC: 0.93 MG/DL (ref 0.55–1.02)
DIFFERENTIAL METHOD BLD: ABNORMAL
EOSINOPHIL # BLD: 0.1 K/UL (ref 0–0.4)
EOSINOPHIL NFR BLD: 1 % (ref 0–7)
ERYTHROCYTE [DISTWIDTH] IN BLOOD BY AUTOMATED COUNT: 17.1 % (ref 11.5–14.5)
GLOBULIN SER CALC-MCNC: 4.9 G/DL (ref 2–4)
GLUCOSE SERPL-MCNC: 124 MG/DL (ref 65–100)
HCT VFR BLD AUTO: 40 % (ref 35–47)
HGB BLD-MCNC: 12.3 G/DL (ref 11.5–16)
IMM GRANULOCYTES # BLD AUTO: 0 K/UL (ref 0–0.04)
IMM GRANULOCYTES NFR BLD AUTO: 1 % (ref 0–0.5)
LYMPHOCYTES # BLD: 0.9 K/UL (ref 0.8–3.5)
LYMPHOCYTES NFR BLD: 15 % (ref 12–49)
MCH RBC QN AUTO: 23.6 PG (ref 26–34)
MCHC RBC AUTO-ENTMCNC: 30.8 G/DL (ref 30–36.5)
MCV RBC AUTO: 76.6 FL (ref 80–99)
MONOCYTES # BLD: 0.7 K/UL (ref 0–1)
MONOCYTES NFR BLD: 11 % (ref 5–13)
NEUTS SEG # BLD: 4.5 K/UL (ref 1.8–8)
NEUTS SEG NFR BLD: 72 % (ref 32–75)
NRBC # BLD: 0 K/UL (ref 0–0.01)
NRBC BLD-RTO: 0 PER 100 WBC
PLATELET # BLD AUTO: 236 K/UL (ref 150–400)
PMV BLD AUTO: 10.2 FL (ref 8.9–12.9)
POTASSIUM SERPL-SCNC: 3.5 MMOL/L (ref 3.5–5.1)
PROT SERPL-MCNC: 8.5 G/DL (ref 6.4–8.2)
RBC # BLD AUTO: 5.22 M/UL (ref 3.8–5.2)
SODIUM SERPL-SCNC: 139 MMOL/L (ref 136–145)
WBC # BLD AUTO: 6.2 K/UL (ref 3.6–11)

## 2020-09-07 PROCEDURE — 85025 COMPLETE CBC W/AUTO DIFF WBC: CPT

## 2020-09-07 PROCEDURE — 80053 COMPREHEN METABOLIC PANEL: CPT

## 2020-09-07 PROCEDURE — 70450 CT HEAD/BRAIN W/O DYE: CPT

## 2020-09-07 PROCEDURE — 71046 X-RAY EXAM CHEST 2 VIEWS: CPT

## 2020-09-07 PROCEDURE — 36415 COLL VENOUS BLD VENIPUNCTURE: CPT

## 2020-09-07 PROCEDURE — 75810000293 HC SIMP/SUPERF WND  RPR

## 2020-09-07 PROCEDURE — 72125 CT NECK SPINE W/O DYE: CPT

## 2020-09-07 PROCEDURE — 93005 ELECTROCARDIOGRAM TRACING: CPT

## 2020-09-07 PROCEDURE — 99285 EMERGENCY DEPT VISIT HI MDM: CPT

## 2020-09-07 PROCEDURE — 72190 X-RAY EXAM OF PELVIS: CPT

## 2020-09-07 PROCEDURE — 74011000250 HC RX REV CODE- 250: Performed by: PHYSICIAN ASSISTANT

## 2020-09-07 PROCEDURE — 74011250637 HC RX REV CODE- 250/637: Performed by: EMERGENCY MEDICINE

## 2020-09-07 RX ORDER — LIDOCAINE HYDROCHLORIDE AND EPINEPHRINE 20; 10 MG/ML; UG/ML
5 INJECTION, SOLUTION INFILTRATION; PERINEURAL
Status: COMPLETED | OUTPATIENT
Start: 2020-09-07 | End: 2020-09-07

## 2020-09-07 RX ORDER — ACETAMINOPHEN 325 MG/1
975 TABLET ORAL ONCE
Status: COMPLETED | OUTPATIENT
Start: 2020-09-07 | End: 2020-09-07

## 2020-09-07 RX ADMIN — LIDOCAINE HYDROCHLORIDE AND EPINEPHRINE 100 MG: 20; 10 INJECTION, SOLUTION INFILTRATION; PERINEURAL at 21:27

## 2020-09-07 RX ADMIN — ACETAMINOPHEN 975 MG: 325 TABLET ORAL at 19:55

## 2020-09-07 NOTE — ED NOTES
Pt presents after GLF at home. Pt reports she was using the restroom and when  She stood up she fell.

## 2020-09-07 NOTE — ED PROVIDER NOTES
EMERGENCY DEPARTMENT HISTORY AND PHYSICAL EXAM      Date: 9/7/2020  Patient Name: Anastacio Harper    History of Presenting Illness     Chief Complaint   Patient presents with   [facility] Fall     Patient reports a GLF after having a bowel movement. Patient's daughter informed EMS that she remembers MDs in the past stating the patient has had vasovagal syncope       History Provided By: Patient    HPI: Anastacio Harper, 80 y.o. female presents to the ED with cc of     Patient reports she was using the bathroom around 2 PM. Reports she felt lightheaded and diaphoretic while having a bowel movement. Has happened prior. History of vasovagal syncope. Patient reports she stood up to wipe and that is the last thing she recalls. She tried to make it to her bed, but fell to the floor. No blood thinners. Feels ok now without chest pain or shortness of breath. Last tetanus 3 years ago. Patient is complaining of mild headache and neck stiffness that is bilateral.  No deficits. Patient endorses mild pain in her hips and a laceration to her right arm. Tetanus is up to date. There are no other complaints, changes, or physical findings at this time. PCP: Thelma Lee MD    No current facility-administered medications on file prior to encounter. Current Outpatient Medications on File Prior to Encounter   Medication Sig Dispense Refill    FreeStyle Michael 14 Day [place] misc USE AS DIRECTED QD      metFORMIN ER (GLUCOPHAGE XR) 500 mg tablet       potassium chloride SR (KLOR-CON 10) 10 mEq tablet TK 1 T PO QD      tiZANidine (ZANAFLEX) 2 mg tablet TK 1 T PO QHS PRN      gabapentin (NEURONTIN) 300 mg capsule Take 1 Cap by mouth four (4) times daily. Max Daily Amount: 1,200 mg. Indications: neuropathic pain 360 Cap 3    alendronate (FOSAMAX) 70 mg tablet Take 70 mg by mouth every seven (7) days.  b complex vitamins tablet Take 1 Tab by mouth daily.       cholecalciferol, VITAMIN D3, (VITAMIN D3) 5,000 unit tab tablet Take 5,000 Units by mouth daily.  cetirizine (ZYRTEC) 10 mg tablet Take  by mouth as needed for Allergies.  hydrochlorothiazide (HYDRODIURIL) 25 mg tablet Take 1 Tab by mouth daily. Restart when Blood Pressure is greater than 120/80      omeprazole (PRILOSEC) 20 mg capsule Take 20 mg by mouth daily.  citalopram (CELEXA) 20 mg tablet Take 20 mg by mouth daily.  atorvastatin (LIPITOR) 20 mg tablet Take 20 mg by mouth daily. Past History     Past Medical History:  Past Medical History:   Diagnosis Date    Arthritis     Chronic pain     knee    Diabetes ([address]     GERD (gastroesophageal reflux disease)     Hypertension     Ill-defined condition     seaonal allergies    Psychiatric disorder     anxiety       Past Surgical History:  Past Surgical History:   Procedure Laterality Date    HX CHOLECYSTECTOMY      HX ÓSCAR AND BSO      [place] COLONOSCOPY FLX DX W/COLLJ SPEC WHEN PFRMD  11/21/2013         TOTAL KNEE ARTHROPLASTY  2016    right    UPPER GI ENDOSCOPY,BIOPSY  11/3/2015            Family History:  Family History   Problem Relation Age of Onset    No Known Problems Mother     No Known Problems Father     Heart Disease Child     Arthritis-osteo Child     Hypertension Child        Social History:  Social History     Tobacco Use    Smoking status: Never Smoker    Smokeless tobacco: Never Used   Substance Use Topics    Alcohol use: Yes     Comment: social    Drug use: No       Allergies: Allergies   Allergen Reactions    Codeine Other (comments)     fainting         Review of Systems   Review of Systems   Constitutional: Negative for activity change, chills and fever. HENT: Negative for facial swelling and voice change. Eyes: Negative for redness. Respiratory: Negative for cough, shortness of breath and wheezing. Cardiovascular: Negative for chest pain and leg swelling. Gastrointestinal: Negative for abdominal pain, diarrhea, nausea and vomiting. Genitourinary: Negative for decreased urine volume. Musculoskeletal: Positive for arthralgias and neck stiffness. Negative for gait problem. Skin: Positive for wound. Negative for pallor and rash. Neurological: Positive for headaches. Negative for tremors and facial asymmetry. Psychiatric/Behavioral: Negative for agitation. All other systems reviewed and are negative. Physical Exam   Physical Exam  Vitals signs and nursing note reviewed. Constitutional:       Comments: Female, resting in bed, no acute distress   HENT:      Head: Normocephalic and atraumatic. Neck:      Musculoskeletal: Normal range of motion. Muscular tenderness (Bilateral paraspinal tenderness) present. Cardiovascular:      Rate and Rhythm: Normal rate and regular rhythm. Heart sounds: No murmur. No friction rub. No gallop. Pulmonary:      Effort: Pulmonary effort is normal.      Breath sounds: Normal breath sounds. Abdominal:      Palpations: Abdomen is soft. Tenderness: There is no abdominal tenderness. Musculoskeletal: Normal range of motion. General: Tenderness (Tenderness over bilateral hips, full range of motion.) present. No deformity. Skin:     General: Skin is warm. Capillary Refill: Capillary refill takes less than 2 seconds. Comments: 5 cm linear laceration over dorsum of right forearm   Neurological:      General: No focal deficit present. Mental Status: She is alert.    Psychiatric:         Mood and Affect: Mood normal.         Diagnostic Study Results     Labs -     Recent Results (from the past 12 hour(s))   EKG, 12 LEAD, INITIAL    Collection Time: 09/07/20  5:04 PM   Result Value Ref Range    Ventricular Rate 80 BPM    Atrial Rate 80 BPM    P-R Interval 156 ms    QRS Duration 126 ms    Q-T Interval 382 ms    QTC Calculation (Bezet) 440 ms    Calculated P Axis 38 degrees    Calculated R Axis -24 degrees    Calculated T Axis 17 degrees    Diagnosis       Normal sinus rhythm  Right bundle branch block  When compared with ECG of 07-AUG-2018 12:54,  Right bundle branch block is now present     CBC WITH AUTOMATED DIFF    Collection Time: 09/07/20  5:18 PM   Result Value Ref Range    WBC 6.2 3.6 - 11.0 K/uL    RBC 5.22 (H) 3.80 - 5.20 M/uL    HGB 12.3 11.5 - 16.0 g/dL    HCT 40.0 35.0 - 47.0 %    MCV 76.6 (L) 80.0 - 99.0 FL    MCH 23.6 (L) 26.0 - 34.0 PG    MCHC 30.8 30.0 - 36.5 g/dL    RDW 17.1 (H) 11.5 - 14.5 %    PLATELET 495 297 - 064 K/uL    MPV 10.2 8.9 - 12.9 FL    NRBC 0.0 0  WBC    ABSOLUTE NRBC 0.00 0.00 - 0.01 K/uL    NEUTROPHILS 72 32 - 75 %    LYMPHOCYTES 15 12 - 49 %    MONOCYTES 11 5 - 13 %    EOSINOPHILS 1 0 - 7 %    BASOPHILS 0 0 - 1 %    IMMATURE GRANULOCYTES 1 (H) 0.0 - 0.5 %    ABS. NEUTROPHILS 4.5 1.8 - 8.0 K/UL    ABS. LYMPHOCYTES 0.9 0.8 - 3.5 K/UL    ABS. MONOCYTES 0.7 0.0 - 1.0 K/UL    ABS. EOSINOPHILS 0.1 0.0 - 0.4 K/UL    ABS. BASOPHILS 0.0 0.0 - 0.1 K/UL    ABS. IMM. GRANS. 0.0 0.00 - 0.04 K/UL    DF AUTOMATED     METABOLIC PANEL, COMPREHENSIVE    Collection Time: 09/07/20  5:18 PM   Result Value Ref Range    Sodium 139 136 - 145 mmol/L    Potassium 3.5 3.5 - 5.1 mmol/L    Chloride 106 97 - 108 mmol/L    CO2 29 21 - 32 mmol/L    Anion gap 4 (L) 5 - 15 mmol/L    Glucose 124 (H) 65 - 100 mg/dL    BUN 23 (H) 6 - 20 MG/DL    Creatinine 0.93 0.55 - 1.02 MG/DL    BUN/Creatinine ratio 25 (H) 12 - 20      GFR est AA >60 >60 ml/min/1.73m2    GFR est non-AA 57 (L) >60 ml/min/1.73m2    Calcium 9.2 8.5 - 10.1 MG/DL    Bilirubin, total 0.4 0.2 - 1.0 MG/DL    ALT (SGPT) 84 (H) 12 - 78 U/L    AST (SGOT) 62 (H) 15 - 37 U/L    Alk. phosphatase 73 45 - 117 U/L    Protein, total 8.5 (H) 6.4 - 8.2 g/dL    Albumin 3.6 3.5 - 5.0 g/dL    Globulin 4.9 (H) 2.0 - 4.0 g/dL    A-G Ratio 0.7 (L) 1.1 - 2.2         Radiologic Studies -   XR CHEST PA LAT   Final Result   IMPRESSION:      No acute process. No change.          XR PELV 3 V   Final Result   IMPRESSION:    Limited study, no fracture. CT HEAD WO CONT   Final Result   IMPRESSION:       Increased chronic microvascular ischemic disease. No acute intracranial   hemorrhage or mass effect. CT SPINE CERV WO CONT   Final Result   IMPRESSION:      1. No fracture. 2. Multilevel degenerative disc disease. Severe central spinal canal stenosis at   C4-C5. CT Results  (Last 48 hours)               09/07/20 1930  CT HEAD WO CONT Final result    Impression:  IMPRESSION:        Increased chronic microvascular ischemic disease. No acute intracranial   hemorrhage or mass effect. Narrative:  EXAM: CT HEAD WO CONT       INDICATION: Syncope while having a bowel movement today. COMPARISON: CT head on 6/13/2014. TECHNIQUE: Noncontrast head CT. Coronal and sagittal reformats. CT dose   reduction was achieved through the use of a standardized protocol tailored for   this examination and automatic exposure control for dose modulation. FINDINGS: The ventricles and sulci are age-appropriate without hydrocephalus. There is no mass effect or midline shift. There is no intracranial hemorrhage or   extra-axial fluid collection. Chronic microvascular ischemic disease is slightly   increased since 2014. No loss of gray-white differentiation. The calvarium is intact. The visualized paranasal sinuses and mastoid air cells   are clear. 09/07/20 1930  CT SPINE CERV WO CONT Final result    Impression:  IMPRESSION:       1. No fracture. 2. Multilevel degenerative disc disease. Severe central spinal canal stenosis at   C4-C5. Narrative:  EXAM:  CT CERVICAL SPINE WITHOUT CONTRAST       INDICATION: Syncope while having a bowel movement. COMPARISON: None. CONTRAST:  None. TECHNIQUE: Multislice helical CT of the cervical spine was performed without   intravenous contrast administration. Sagittal and coronal reformats were   generated.   CT dose reduction was achieved through use of a standardized   protocol tailored for this examination and automatic exposure control for dose   modulation. FINDINGS:       Cervical kyphosis may be positional. 2 mm anterior subluxation of C7 on T1. Multilevel facet arthrosis. There is no fracture or compression deformity. The   odontoid process is intact. The craniocervical junction is within normal limits. Severe disc degeneration at C4-C5. Moderate disc degeneration at C5-6 and C6-7. The incidentally imaged soft tissues are within normal limits. C2-C3: Mild right foraminal stenosis. .       C3-C4: Mild central spinal canal stenosis. Mild right foraminal stenosis. C4-C5: Posterior disc osteophyte complex. Severe central spinal canal stenosis. Moderate left and mild right foraminal stenosis. C5-C6: Mild central spinal canal stenosis. Mild right foraminal stenosis. C6-C7: Posterior disc osteophyte complex. Mild central spinal canal stenosis. Mild bilateral foraminal stenosis. C7-T1: There is no spinal canal or neural foraminal stenosis. CXR Results  (Last 48 hours)               09/07/20 2031  XR CHEST PA LAT Final result    Impression:  IMPRESSION:       No acute process. No change. Narrative:  EXAM: XR CHEST PA LAT       INDICATION: Syncope, fall off of a commode       COMPARISON: Chest views on 8/2/2016. CT chest on 8/15/2019. TECHNIQUE: PA and lateral chest views       FINDINGS: The cardiomediastinal and hilar contours are within normal limits. The   pulmonary vasculature is within normal limits. The lungs and pleural spaces are clear. The visualized bones and upper abdomen   are age-appropriate. Medical Decision Making   I am the first provider for this patient. I reviewed the vital signs, available nursing notes, past medical history, past surgical history, family history and social history.     Vital Signs-Reviewed the patient's vital signs. Patient Vitals for the past 12 hrs:   Temp Pulse Resp BP SpO2   09/07/20 1915    149/71 99 %   09/07/20 1900    141/68 99 %   09/07/20 1845    128/69 98 %   09/07/20 1703 98.5 °F (36.9 °C) 87 16 136/67 97 %         Records Reviewed: Nursing Notes, Old Medical Records and Previous electrocardiograms    Provider Notes (Medical Decision Making):     80 YOF with a PMH of vasovagal syncope presents after syncopal fall. VS normal. Patient back to baseline. Given her syncope during defecation and history of similar per patients report sounds suspicious for vasovagal syncope. Reassuring given prodrome, no chest pain or palpitations. EKG shows normal sinus rhythm, there is a RBBB, this is new, but no predisposition for syncope such as heart block, discussed with patient, recommend cardiology follow-up. Labs reassuring, will ensure PCP follow-up for mild transaminitis. Imaging negative as below. Appreciate assistance with laceration repair. ED Course:   Initial assessment performed. The patients presenting problems have been discussed, and they are in agreement with the care plan formulated and outlined with them. I have encouraged them to ask questions as they arise throughout their visit. ED Course as of Sep 07 2225   Mon Sep 07, 2020   1906 EKG interpreted by me. Shows SR with a HR of 80. No ST elevations or depressions concerning for ischemia. RBBB presents        [MB]   2056 Labs reviewed and unremarkable. [MB]   2057 Imaging negative for acute traumatic injury, severe stenosis noted but no fracture. Patient will need laceration repair and anticipate discharge. [MB]   5141 Patient reassessed, endorses some weakness with extension of her thumb. She denies this being present before local anesthesia. Discussed with PA, no evidence of tendon injury. Discussed with patient hand follow-up which we will provide.     [MB]      ED Course User Index  [MB] Gianna Wesley MD PROCEDURES  Procedure Note - Laceration Repair:  9:44 PM  Procedure by Titi Rider PA-C . Complexity: complex  4cm linear laceration to forearm  was irrigated copiously with NS under jet lavage, prepped with Chlorprep and draped in a sterile fashion. The area was anesthetized with 10 mLs of  Lidocaine 1% with epinephrine via local infiltration of 10 mL lidocaine 1% with epinephrine. The wound was explored with the following results: No foreign bodies found. The wound was repaired with One layer suture closure: Skin Layer:  6 sutures placed, stitch type:simple interrupted, suture: 5-0 nylon. Lesle Gulling steri strips also applied to reinforce the skin. The wound was closed with good hemostasis and approximation. Sterile dressing applied. Estimated blood loss: minimal  The procedure took 1-15 minutes, and pt tolerated well. Margy Tejada MD      Disposition:    Discharged    Reassessments as above. Labs and ED course reviewed. Patient was discharged home and was provided strict return precautions. Patient to follow-up with PCP or specialist per discharge instructions or return to ED for worsening symptoms. DISCHARGE PLAN:  1. Current Discharge Medication List        2. Follow-up Information     Follow up With Specialties Details Why Contact Info    Arnold Jay MD Family Medicine In 2 days  [address] [place] [address]  [phone]      Dixie Barber MD Cardiology, [address] [facility] Vascular Surgery, Internal Medicine In 1 week cardiology doctor [address] [place]  [phone]      García Prince MD Hand Surgery In 3 days hand doctor if needed for thumb [address] [facility]  [address]  [address]  [phone]          3. Return to ED if worse     Diagnosis     Clinical Impression:   1. Vasovagal syncope    2. Fall, initial encounter    3. Right bundle branch block    4.  Laceration of right upper extremity, initial encounter    5. Transaminitis        Attestations:    Shanda Kan MD    Please note that this dictation was completed with [employer], the computer voice recognition software. Quite often unanticipated grammatical, syntax, homophones, and other interpretive errors are inadvertently transcribed by the computer software. Please disregard these errors. Please excuse any errors that have escaped final proofreading. Thank you.

## 2020-09-08 NOTE — DISCHARGE INSTRUCTIONS
You were seen in the ER after a fall. Your images did not show any injuries. Please follow-up with your primary care doctor. Please discuss you mildly elevated liver tests. You fall was likely due to vasovagal syncope while having a bowel movement. Return for worsening symptoms at any time. Please follow-up with the heart doctor regarding your right bundle branch block.

## 2020-09-09 LAB
ATRIAL RATE: 80 BPM
CALCULATED P AXIS, ECG09: 38 DEGREES
CALCULATED R AXIS, ECG10: -24 DEGREES
CALCULATED T AXIS, ECG11: 17 DEGREES
DIAGNOSIS, 93000: NORMAL
P-R INTERVAL, ECG05: 156 MS
Q-T INTERVAL, ECG07: 382 MS
QRS DURATION, ECG06: 126 MS
QTC CALCULATION (BEZET), ECG08: 440 MS
VENTRICULAR RATE, ECG03: 80 BPM

## 2020-09-18 ENCOUNTER — APPOINTMENT (OUTPATIENT)
Dept: GENERAL RADIOLOGY | Age: 85
End: 2020-09-18
Payer: MEDICARE

## 2020-09-18 ENCOUNTER — HOSPITAL ENCOUNTER (EMERGENCY)
Age: 85
Discharge: HOME | End: 2020-09-18
Attending: EMERGENCY MEDICINE
Payer: MEDICARE

## 2020-09-18 VITALS
TEMPERATURE: 97.9 F | DIASTOLIC BLOOD PRESSURE: 72 MMHG | HEART RATE: 85 BPM | RESPIRATION RATE: 16 BRPM | SYSTOLIC BLOOD PRESSURE: 154 MMHG | WEIGHT: 157.41 LBS | HEIGHT: 62 IN | OXYGEN SATURATION: 98 % | BODY MASS INDEX: 28.97 KG/M2

## 2020-09-18 DIAGNOSIS — R93.89 ABNORMAL X-RAY: ICD-10-CM

## 2020-09-18 DIAGNOSIS — M54.50 ACUTE BILATERAL LOW BACK PAIN WITHOUT SCIATICA: ICD-10-CM

## 2020-09-18 DIAGNOSIS — Z51.89 VISIT FOR WOUND CHECK: ICD-10-CM

## 2020-09-18 DIAGNOSIS — Z48.02 VISIT FOR SUTURE REMOVAL: Primary | ICD-10-CM

## 2020-09-18 PROCEDURE — 74011250637 HC RX REV CODE- 250/637: Performed by: PHYSICIAN ASSISTANT

## 2020-09-18 PROCEDURE — 99282 EMERGENCY DEPT VISIT SF MDM: CPT

## 2020-09-18 PROCEDURE — 72100 X-RAY EXAM L-S SPINE 2/3 VWS: CPT

## 2020-09-18 RX ORDER — CYCLOBENZAPRINE HCL 5 MG
5 TABLET ORAL
Qty: 10 TAB | Refills: 0 | Status: SHIPPED | OUTPATIENT
Start: 2020-09-18

## 2020-09-18 RX ORDER — CYCLOBENZAPRINE HCL 10 MG
10 TABLET ORAL
Status: COMPLETED | OUTPATIENT
Start: 2020-09-18 | End: 2020-09-18

## 2020-09-18 RX ADMIN — CYCLOBENZAPRINE 10 MG: 10 TABLET, FILM COATED ORAL at 13:05

## 2020-09-18 NOTE — DISCHARGE INSTRUCTIONS
Keep wound clean and dry. Rest, gentle stretching, massage. Avoid prolonged sitting. Follow up with primary care for recheck. Contact information provided for Orthopedist if back pain persists. Return to the Emergency Dept for any concerns.

## 2020-09-18 NOTE — ED PROVIDER NOTES
EMERGENCY DEPARTMENT HISTORY AND PHYSICAL EXAM      Date: 9/18/2020  Patient Name: Anastacio Harper    History of Presenting Illness     Chief Complaint   Patient presents with    Suture Removal       History Provided By: Patient, patient's son    HPI: Anastacio Harper, 80 y.o. female presents ambulatory to the Emergency Dept with request for suture removal.  Pt had sutures placed to R forearm after a fall approx 10 days ago. The patient's son is concerned as the patient's back pain has worsened since originally seen. She denied any new injury. No numbness/tingling. No dysuria/hematuria. No weakness. She notes increased low back pain with position changes/movement. No constipation/straining. No incontinence of bowel/bladder. Pt is without fever/chills, cough, congestion, chest pain, shortness of breath, N/V/D. Chief Complaint: wound check/suture removal from R UE; also c/o low back pain  Duration: 1 Weeks  Timing:  Acute  Location: lower lumbar region  Quality: Aching  Severity: Moderate  Modifying Factors: increased low back pain noted with position changes, movement  Associated Symptoms: denies any other associated signs or symptoms        There are no other complaints, changes, or physical findings at this time. PCP: Thelma Lee MD    Current Outpatient Medications   Medication Sig Dispense Refill    cyclobenzaprine (FLEXERIL) 5 mg tablet Take 1 Tab by mouth nightly as needed for Muscle Spasm(s) for up to 10 doses. 10 Tab 0    FreeStyle Michael 14 [address] [place] misc USE AS DIRECTED QD      metFORMIN ER (GLUCOPHAGE XR) 500 mg tablet       potassium chloride SR (KLOR-CON 10) 10 mEq tablet TK 1 T PO QD      tiZANidine (ZANAFLEX) 2 mg tablet TK 1 T PO QHS PRN      gabapentin (NEURONTIN) 300 mg capsule Take 1 Cap by mouth four (4) times daily. Max Daily Amount: 1,200 mg. Indications: neuropathic pain 360 Cap 3    alendronate (FOSAMAX) 70 mg tablet Take 70 mg by mouth every seven (7) days.  b complex vitamins tablet Take 1 Tab by mouth daily.  cholecalciferol, VITAMIN D3, (VITAMIN D3) 5,000 unit tab tablet Take 5,000 Units by mouth daily.  cetirizine (ZYRTEC) 10 mg tablet Take  by mouth as needed for Allergies.  hydrochlorothiazide (HYDRODIURIL) 25 mg tablet Take 1 Tab by mouth daily. Restart when Blood Pressure is greater than 120/80      omeprazole (PRILOSEC) 20 mg capsule Take 20 mg by mouth daily.  citalopram (CELEXA) 20 mg tablet Take 20 mg by mouth daily.  atorvastatin (LIPITOR) 20 mg tablet Take 20 mg by mouth daily. Past History     Past Medical History:  Past Medical History:   Diagnosis Date    Arthritis     Chronic pain     knee    Diabetes ([address]     GERD (gastroesophageal reflux disease)     Hypertension     Ill-defined condition     seaonal allergies    Psychiatric disorder     anxiety       Past Surgical History:  Past Surgical History:   Procedure Laterality Date    HX CHOLECYSTECTOMY      HX ÓSCAR AND BSO      WA COLONOSCOPY FLX DX W/COLLJ SPEC WHEN PFRMD  11/21/2013         TOTAL KNEE ARTHROPLASTY  2016    right    UPPER GI ENDOSCOPY,BIOPSY  11/3/2015            Family History:  Family History   Problem Relation Age of Onset    No Known Problems Mother     No Known Problems Father     Heart Disease Child     Arthritis-osteo Child     Hypertension Child        Social History:  Social History     Tobacco Use    Smoking status: Never Smoker    Smokeless tobacco: Never Used   Substance Use Topics    Alcohol use: Yes     Comment: social    Drug use: No       Allergies: Allergies   Allergen Reactions    Codeine Other (comments)     fainting         Review of Systems   Review of Systems   Constitutional: Negative for chills and fever. HENT: Negative for congestion, rhinorrhea and sore throat. Respiratory: Negative for cough and shortness of breath. Cardiovascular: Negative for chest pain and palpitations.    Gastrointestinal: Negative for abdominal pain, diarrhea, nausea and vomiting. Endocrine: Negative for polydipsia, polyphagia and polyuria. Genitourinary: Negative for dysuria and hematuria. Musculoskeletal: Positive for back pain. Negative for neck pain and neck stiffness. Skin: Negative for color change and rash. Allergic/Immunologic: Negative for food allergies and immunocompromised state. Neurological: Negative for weakness and numbness. Hematological: Negative for adenopathy. Does not bruise/bleed easily. Psychiatric/Behavioral: Negative for agitation and confusion. All other systems reviewed and are negative. Physical Exam   Physical Exam  Vitals signs and nursing note reviewed. Constitutional:       General: She is not in acute distress. Appearance: Normal appearance. She is well-developed and normal weight. She is not ill-appearing, toxic-appearing or diaphoretic. HENT:      Head: Normocephalic and atraumatic. Nose: Nose normal.      Mouth/Throat:      Mouth: Mucous membranes are moist.      Pharynx: No oropharyngeal exudate. Eyes:      General: No scleral icterus. Right eye: No discharge. Left eye: No discharge. Conjunctiva/sclera: Conjunctivae normal.   Neck:      Musculoskeletal: Normal range of motion and neck supple. Thyroid: No thyromegaly. Vascular: No JVD. Trachea: No tracheal deviation. Cardiovascular:      Rate and Rhythm: Normal rate and regular rhythm. Pulses: Normal pulses. Heart sounds: Normal heart sounds. Pulmonary:      Effort: Pulmonary effort is normal. No respiratory distress. Breath sounds: Normal breath sounds. No wheezing. Abdominal:      General: Abdomen is flat. There is no distension. Tenderness: There is no abdominal tenderness. There is no right CVA tenderness, left CVA tenderness, guarding or rebound. Musculoskeletal:         General: Tenderness present. Right lower leg: No edema.       Left lower leg: No edema. Comments: Decreased A/P ROM to paralumbar region due to tenderness with palpation and movement. No deformity noted. No midline spinal tenderness. Neg SLR. Pt observed to ambulate without deficit. 2+ distal pulses, NVI. Sensation grossly intact to light touch. Skin:     General: Skin is warm and dry. Findings: No bruising. Comments: Well healing wound noted to R forearm, sutures intact   Neurological:      General: No focal deficit present. Mental Status: She is alert and oriented to person, place, and time. Motor: No abnormal muscle tone. Coordination: Coordination normal.   Psychiatric:         Mood and Affect: Mood normal.         Behavior: Behavior normal.         Judgment: Judgment normal.         Diagnostic Study Results     Labs -   No results found for this or any previous visit (from the past 12 hour(s)). Radiologic Studies -   XR SPINE LUMB 2 OR 3 V   Final Result   IMPRESSION:    1. Age-indeterminate L3 superior endplate fracture, new compared to prior plain   radiograph dated 2017. 2. Chronic L2 compression fracture deformity. 3. Chronic 2 mm anterolisthesis of L3 with respect to L4. Medical Decision Making   I am the first provider for this patient. I reviewed the vital signs, available nursing notes, past medical history, past surgical history, family history and social history. Vital Signs-Reviewed the patient's vital signs. Patient Vitals for the past 12 hrs:   Temp Pulse Resp BP SpO2   09/18/20 1224 97.9 °F (36.6 °C) 85 16 (!) 154/72 98 %         Records Reviewed: Nursing Notes, Old Medical Records, Previous Radiology Studies and Previous Laboratory Studies    Provider Notes (Medical Decision Making):   Strain, spasm, DDD, OA, compression fx    ED Course:   Initial assessment performed.  The patients presenting problems have been discussed, and they are in agreement with the care plan formulated and outlined with them.  I have encouraged them to ask questions as they arise throughout their visit. DISCHARGE NOTE:  The care plan has been outline with the patient and/or family, who verbally conveyed understanding and agreement. Available results have been reviewed. Patient and/or family understand the follow up plan as outlined and discharge instructions. Should their condition deterioration at any time after discharge the patient agrees to return, follow up sooner than outlined or seek medical assistance at the closest Emergency Room as soon as possible. Questions have been answered. Discharge instructions and educational information regarding the patient's diagnosis as well a list of reasons why the patient would want to seek immediate medical attention, should their condition change, were reviewed directly with the patient/family        PLAN:  1. Discharge Medication List as of 9/18/2020  2:01 PM      START taking these medications    Details   cyclobenzaprine (FLEXERIL) 5 mg tablet Take 1 Tab by mouth nightly as needed for Muscle Spasm(s) for up to 10 doses. , Normal, Disp-10 Tab,R-0         CONTINUE these medications which have NOT CHANGED    Details   FreeStyle Michael 14 Day [place] misc USE AS DIRECTED QD, Historical Med, [place]      metFORMIN ER (GLUCOPHAGE XR) 500 mg tablet Historical Med      potassium chloride SR (KLOR-CON 10) 10 mEq tablet TK 1 T PO QD, Historical Med      tiZANidine (ZANAFLEX) 2 mg tablet TK 1 T PO QHS PRN, Historical Med      gabapentin (NEURONTIN) 300 mg capsule Take 1 Cap by mouth four (4) times daily. Max Daily Amount: 1,200 mg. Indications: neuropathic pain, Normal, Disp-360 Cap,R-3      alendronate (FOSAMAX) 70 mg tablet Take 70 mg by mouth every seven (7) days. , Historical Med      b complex vitamins tablet Take 1 Tab by mouth daily. , Historical Med      cholecalciferol, VITAMIN D3, (VITAMIN D3) 5,000 unit tab tablet Take 5,000 Units by mouth daily. , Historical Med      cetirizine (ZYRTEC) 10 mg tablet Take  by mouth as needed for Allergies. , Historical Med      hydrochlorothiazide (HYDRODIURIL) 25 mg tablet Take 1 Tab by mouth daily. Restart when Blood Pressure is greater than 120/80, Historical Med      omeprazole (PRILOSEC) 20 mg capsule Take 20 mg by mouth daily. , Historical Med      citalopram (CELEXA) 20 mg tablet Take 20 mg by mouth daily. , Historical Med      atorvastatin (LIPITOR) 20 mg tablet Take 20 mg by mouth daily. , Historical Med           2. Follow-up Information     Follow up With Specialties Details Why Contact Info    Jessica Judgeblu, [address]  [phone]      Londa Landau, MD Orthopedic Surgery   [address]  [address]  [address] [facility]  [phone]      [facility] EMERGENCY DEPT Emergency Medicine  If symptoms worsen [address]  [phone]        Return to ED if worse     Diagnosis     Clinical Impression:   1. Visit for suture removal    2. Visit for wound check    3. Acute bilateral low back pain without sciatica    4.  Abnormal x-ray

## 2020-09-18 NOTE — ED NOTES
Tan Sanabria reviewed discharge instructions with the patient. The patient verbalized understanding.

## 2020-09-25 ENCOUNTER — HOSPITAL ENCOUNTER (EMERGENCY)
Age: 85
Discharge: HOME | End: 2020-09-25
Attending: EMERGENCY MEDICINE | Admitting: EMERGENCY MEDICINE
Payer: MEDICARE

## 2020-09-25 ENCOUNTER — APPOINTMENT (OUTPATIENT)
Dept: CT IMAGING | Age: 85
End: 2020-09-25
Attending: EMERGENCY MEDICINE
Payer: MEDICARE

## 2020-09-25 VITALS
DIASTOLIC BLOOD PRESSURE: 78 MMHG | OXYGEN SATURATION: 98 % | SYSTOLIC BLOOD PRESSURE: 142 MMHG | HEIGHT: 62 IN | TEMPERATURE: 98.7 F | HEART RATE: 80 BPM | BODY MASS INDEX: 28.97 KG/M2 | WEIGHT: 157.41 LBS | RESPIRATION RATE: 18 BRPM

## 2020-09-25 DIAGNOSIS — M25.559 HIP PAIN: Primary | ICD-10-CM

## 2020-09-25 LAB

## 2020-09-25 PROCEDURE — 99284 EMERGENCY DEPT VISIT MOD MDM: CPT

## 2020-09-25 PROCEDURE — 81001 URINALYSIS AUTO W/SCOPE: CPT

## 2020-09-25 PROCEDURE — 72192 CT PELVIS W/O DYE: CPT

## 2020-09-25 PROCEDURE — 74011250637 HC RX REV CODE- 250/637: Performed by: EMERGENCY MEDICINE

## 2020-09-25 RX ORDER — DEXTROMETHORPHAN HYDROBROMIDE, GUAIFENESIN 5; 100 MG/5ML; MG/5ML
650 LIQUID ORAL EVERY 8 HOURS
Qty: 20 TAB | Refills: 0 | Status: SHIPPED | OUTPATIENT
Start: 2020-09-25

## 2020-09-25 RX ORDER — NAPROXEN 500 MG/1
500 TABLET ORAL
Qty: 20 TAB | Refills: 0 | Status: SHIPPED | OUTPATIENT
Start: 2020-09-25

## 2020-09-25 RX ORDER — OXYCODONE HYDROCHLORIDE 5 MG/1
5 TABLET ORAL
Qty: 10 TAB | Refills: 0 | Status: SHIPPED | OUTPATIENT
Start: 2020-09-25 | End: 2020-09-28

## 2020-09-25 RX ORDER — OXYCODONE AND ACETAMINOPHEN 5; 325 MG/1; MG/1
2 TABLET ORAL
Status: COMPLETED | OUTPATIENT
Start: 2020-09-25 | End: 2020-09-25

## 2020-09-25 RX ADMIN — OXYCODONE HYDROCHLORIDE AND ACETAMINOPHEN 2 TABLET: 5; 325 TABLET ORAL at 02:51

## 2020-09-25 NOTE — ED PROVIDER NOTES
EMERGENCY DEPARTMENT HISTORY AND PHYSICAL EXAM     ----------------------------------------------------------------------------  Please note that this dictation was completed with [employer], the computer voice recognition software. Quite often unanticipated grammatical, syntax, homophones, and other interpretive errors are inadvertently transcribed by the computer software. Please disregard these errors. Please excuse any errors that have escaped final proofreading  ----------------------------------------------------------------------------      Date: 9/25/2020  Patient Name: Benny Culver    History of Presenting Illness     Chief Complaint   Patient presents with    Hip Pain       History Provided By:  Patient    HPI: Benny Culver is a 80 y.o. female, with significant pmhx of hypertension, arthritis, reflux, diabetes, chronic knee pain, who presents via EMS to the ED with c/o right hip pain. Patient reports having suffered a fall on the seventh of this month with subsequent evaluation showing no fracture. Patient notes that after she left the hospital that day she felt fine and had no pain. Over the last week she started having aching/stabbing pain in her right hip radiating down her leg. Denies recurrence of fall or other trauma. Notes taking tramadol at home without much relief. Pain is exacerbated by getting up from a sitting position or walking. Notes that the pain was keeping her up this evening prompting her to come the emergency department for further evaluation. Patient also specifically denies any associated fevers, chills, CP, SOB, nausea, vomiting, diarrhea, abd pain, changes in BM, urinary sxs, or headache. Social Hx: denies tobacco  denies EtOH , denies Illicit Drugs    There are no other complaints, changes, or physical findings at this time.      PCP: Neville Huang MD    Allergies   Allergen Reactions    Codeine Other (comments)     fainting       Current Outpatient Medications   Medication Sig Dispense Refill    oxyCODONE IR (Roxicodone) 5 mg immediate release tablet Take 1 Tab by mouth every six (6) hours as needed for Pain for up to 3 days. Max Daily Amount: 20 mg. 10 Tab 0    naproxen (NAPROSYN) 500 mg tablet Take 1 Tab by mouth every twelve (12) hours as needed for Pain. 20 Tab 0    acetaminophen (Tylenol Arthritis Pain) 650 mg TbER Take 1 Tab by mouth every eight (8) hours. 20 Tab 0    cyclobenzaprine (FLEXERIL) 5 mg tablet Take 1 Tab by mouth nightly as needed for Muscle Spasm(s) for up to 10 doses. 10 Tab 0    FreeStyle Michael 14 Day [place] misc USE AS DIRECTED QD      metFORMIN ER (GLUCOPHAGE XR) 500 mg tablet       potassium chloride SR (KLOR-CON 10) 10 mEq tablet TK 1 T PO QD      tiZANidine (ZANAFLEX) 2 mg tablet TK 1 T PO QHS PRN      gabapentin (NEURONTIN) 300 mg capsule Take 1 Cap by mouth four (4) times daily. Max Daily Amount: 1,200 mg. Indications: neuropathic pain 360 Cap 3    alendronate (FOSAMAX) 70 mg tablet Take 70 mg by mouth every seven (7) days.  b complex vitamins tablet Take 1 Tab by mouth daily.  cholecalciferol, VITAMIN D3, (VITAMIN D3) 5,000 unit tab tablet Take 5,000 Units by mouth daily.  cetirizine (ZYRTEC) 10 mg tablet Take  by mouth as needed for Allergies.  hydrochlorothiazide (HYDRODIURIL) 25 mg tablet Take 1 Tab by mouth daily. Restart when Blood Pressure is greater than 120/80      omeprazole (PRILOSEC) 20 mg capsule Take 20 mg by mouth daily.  citalopram (CELEXA) 20 mg tablet Take 20 mg by mouth daily.  atorvastatin (LIPITOR) 20 mg tablet Take 20 mg by mouth daily.          Past History     Past Medical History:  Past Medical History:   Diagnosis Date    Arthritis     Chronic pain     knee    Diabetes ([address]     GERD (gastroesophageal reflux disease)     Hypertension     Ill-defined condition     seaonal allergies    Psychiatric disorder     anxiety       Past Surgical History:  Past Surgical History:   Procedure Laterality Date    HX CHOLECYSTECTOMY      HX ÓSCAR AND BSO      PA COLONOSCOPY FLX DX W/COLLJ SPEC WHEN PFRMD  11/21/2013         TOTAL KNEE ARTHROPLASTY  2016    right    UPPER GI ENDOSCOPY,BIOPSY  11/3/2015            Family History:  Family History   Problem Relation Age of Onset    No Known Problems Mother     No Known Problems Father     Heart Disease Child     Arthritis-osteo Child     Hypertension Child        Social History:  Social History     Tobacco Use    Smoking status: Never Smoker    Smokeless tobacco: Never Used   Substance Use Topics    Alcohol use: Yes     Comment: social    Drug use: No       Allergies: Allergies   Allergen Reactions    Codeine Other (comments)     fainting         Review of Systems   Review of Systems   Constitutional: Negative. Negative for fever. Eyes: Negative. Respiratory: Negative. Negative for shortness of breath. Cardiovascular: Negative for chest pain. Gastrointestinal: Negative for abdominal pain, nausea and vomiting. Endocrine: Negative. Genitourinary: Negative. Negative for difficulty urinating, dysuria and hematuria. Musculoskeletal: Positive for arthralgias (R hip pain). Skin: Negative. Neurological: Negative. Psychiatric/Behavioral: Negative for suicidal ideas. All other systems reviewed and are negative. Physical Exam   Physical Exam  Vitals signs and nursing note reviewed. Constitutional:       General: She is not in acute distress. Appearance: She is well-developed. She is not diaphoretic. HENT:      Head: Normocephalic and atraumatic. Nose: Nose normal.   Eyes:      General: No scleral icterus. Conjunctiva/sclera: Conjunctivae normal.   Neck:      Musculoskeletal: Normal range of motion. Trachea: No tracheal deviation. Cardiovascular:      Rate and Rhythm: Normal rate and regular rhythm. Heart sounds: Normal heart sounds. No murmur. No friction rub.    Pulmonary: Effort: Pulmonary effort is normal. No respiratory distress. Breath sounds: Normal breath sounds. No stridor. No wheezing or rales. Abdominal:      General: Bowel sounds are normal. There is no distension. Palpations: Abdomen is soft. Tenderness: There is no abdominal tenderness. There is no rebound. Musculoskeletal: Normal range of motion. General: Tenderness present. Right hip: She exhibits tenderness. Legs:    Skin:     General: Skin is warm and dry. Findings: No rash. Neurological:      Mental Status: She is alert and oriented to person, place, and time. Cranial Nerves: No cranial nerve deficit. Psychiatric:         Speech: Speech normal.         Behavior: Behavior normal.         Thought Content: Thought content normal.         Judgment: Judgment normal.           Diagnostic Study Results     Labs -     Recent Results (from the past 12 hour(s))   URINALYSIS W/ REFLEX CULTURE    Collection Time: 09/25/20  3:37 AM    Specimen: Urine   Result Value Ref Range    Color YELLOW/STRAW      Appearance CLEAR CLEAR      Specific gravity 1.012 1.003 - 1.030      pH (UA) 6.5 5.0 - 8.0      Protein Negative NEG mg/dL    Glucose Negative NEG mg/dL    Ketone Negative NEG mg/dL    Bilirubin Negative NEG      Blood Negative NEG      Urobilinogen 0.2 0.2 - 1.0 EU/dL    Nitrites Negative NEG      Leukocyte Esterase Negative NEG      WBC 0-4 0 - 4 /hpf    RBC 0-5 0 - 5 /hpf    Epithelial cells FEW FEW /lpf    Bacteria Negative NEG /hpf    UA:UC IF INDICATED CULTURE NOT INDICATED BY UA RESULT CNI      Hyaline cast 0-2 0 - 5 /lpf       Radiologic Studies -   CT PELV WO CONT   Final Result   IMPRESSION:    No acute fracture or other acute abnormality in the pelvis. CT Results  (Last 48 hours)               09/25/20 0242  CT PELV WO CONT Final result    Impression:  IMPRESSION:    No acute fracture or other acute abnormality in the pelvis.                Narrative: EXAM:  CT PELV WO CONT       INDICATION: Persistent left hip pain since fall on 9/7/2020       COMPARISON: Radiograph 9/7/2020. TECHNIQUE: Helical CT of the pelvis  without contrast. Coronal and sagittal   reformats are performed. CT dose reduction was achieved through use of a   standardized protocol tailored for this examination and automatic exposure   control for dose modulation. FINDINGS:    There is no acute fracture or dislocation. There is no hip joint effusion or   soft tissue hematoma. There is mild bilateral hip joint space narrowing. Pubic   symphysis degenerative changes are noted. There is L5-S1 degenerative change. The urinary bladder is unremarkable. There is no pelvic mass or lymphadenopathy. There are no dilated bowel loops. There is distal colonic diverticulosis without   focal adjacent inflammation. There is no free fluid. CXR Results  (Last 48 hours)    None            Medical Decision Making   I am the first provider for this patient. I reviewed the vital signs, available nursing notes, past medical history, past surgical history, family history and social history. Vital Signs-Reviewed the patient's vital signs. Patient Vitals for the past 12 hrs:   Temp Pulse Resp BP SpO2   09/25/20 0458 98.7 °F (37.1 °C) 80 18 (!) 142/78 98 %   09/25/20 0317 98.3 °F (36.8 °C) 85 18 (!) 151/76 97 %   09/25/20 0315     98 %   09/25/20 0300    (!) 156/65 99 %   09/25/20 0121 98.4 °F (36.9 °C) 82 16 (!) 151/70 98 %       Pulse Oximetry Analysis - 98% on RA    Cardiac Monitor:   Rate: 82 bpm  Rhythm: nsr      Provider Notes (Medical Decision Making):     DDX:  Hairline fracture, acetabular fracture, arthritis, sciatica, UTI    Plan:  CT pelvis, analgesic, UA    Impression:  Hip pain    ED Course:   Initial assessment performed. The patients presenting problems have been discussed, and they are in agreement with the care plan formulated and outlined with them.   I have encouraged them to ask questions as they arise throughout their visit. I reviewed our electronic medical record system for any past medical records that were available that may contribute to the patients current condition, the nursing notes and and vital signs from today's visit    Nursing notes will be reviewed as they become available in realtime while the pt has been in the ED. Ganga Sanders MD    I personally reviewed/interpreted pt's imaging. Agree with official read by radiology as noted above. Ganga Sanders MD      0667  Progress note:  Pt noted to be feeling better after pain medicines provided, ready for discharge. Discussed lab and imaging findings with pt, specifically noting evidence of fracture. Pt will follow up with orthopedics and primary care as instructed. Patient's family member notes that they have already established care with [address] [place] [address] after her previous fall and were referred for physical therapy follow-up. Again urged him to continue following with their instructions. All questions have been answered, pt voiced understanding and agreement with plan. Specific return precautions provided in addition to instructions for pt to return to the ED immediately should sx worsen at any time. Ganga Sanders MD           Critical Care Time:     none      Diagnosis     Clinical Impression:   1. Hip pain        PLAN:  1. Discharge Medication List as of 9/25/2020  4:26 AM      START taking these medications    Details   oxyCODONE IR (Roxicodone) 5 mg immediate release tablet Take 1 Tab by mouth every six (6) hours as needed for Pain for up to 3 days. Max Daily Amount: 20 mg., Normal, Disp-10 Tab,R-0      naproxen (NAPROSYN) 500 mg tablet Take 1 Tab by mouth every twelve (12) hours as needed for Pain., Normal, Disp-20 Tab,R-0      acetaminophen (Tylenol Arthritis Pain) 650 mg TbER Take 1 Tab by mouth every eight (8) hours. , Normal, Disp-20 Tab,R-0         CONTINUE these medications which have NOT CHANGED    Details   cyclobenzaprine (FLEXERIL) 5 mg tablet Take 1 Tab by mouth nightly as needed for Muscle Spasm(s) for up to 10 doses. , Normal, Disp-10 Tab,R-0      FreeStyle Michael 14 Day [place] misc USE AS DIRECTED QD, Historical Med, [facility]      metFORMIN ER (GLUCOPHAGE XR) 500 mg tablet Historical Med      potassium chloride SR (KLOR-CON 10) 10 mEq tablet TK 1 T PO QD, Historical Med      tiZANidine (ZANAFLEX) 2 mg tablet TK 1 T PO QHS PRN, Historical Med      gabapentin (NEURONTIN) 300 mg capsule Take 1 Cap by mouth four (4) times daily. Max Daily Amount: 1,200 mg. Indications: neuropathic pain, Normal, Disp-360 Cap,R-3      alendronate (FOSAMAX) 70 mg tablet Take 70 mg by mouth every seven (7) days. , Historical Med      b complex vitamins tablet Take 1 Tab by mouth daily. , Historical Med      cholecalciferol, VITAMIN D3, (VITAMIN D3) 5,000 unit tab tablet Take 5,000 Units by mouth daily. , Historical Med      cetirizine (ZYRTEC) 10 mg tablet Take  by mouth as needed for Allergies. , Historical Med      hydrochlorothiazide (HYDRODIURIL) 25 mg tablet Take 1 Tab by mouth daily. Restart when Blood Pressure is greater than 120/80, Historical Med      omeprazole (PRILOSEC) 20 mg capsule Take 20 mg by mouth daily. , Historical Med      citalopram (CELEXA) 20 mg tablet Take 20 mg by mouth daily. , Historical Med      atorvastatin (LIPITOR) 20 mg tablet Take 20 mg by mouth daily. , Historical Med           2.    Follow-up Information     Follow up With Specialties Details Why Contact Info    Neville Huang MD Family Medicine Schedule an appointment as soon as possible for a visit in 2 days  [address] [facility]  [phone]      [facility]  Schedule an appointment as soon as possible for a visit in 2 days  [facility]  [address] [facility],[address]  [address] [facility]  [phone]        Return to ED if worse     Disposition:  0430  The patient's results have been reviewed with family and/or caregiver. They verbally convey their understanding and agreement of the patient's signs, symptoms, diagnosis, treatment and prognosis and additionally agree to follow up as recommended in the discharge instructions or to return to the Emergency Room should the patient's condition change prior to their follow-up appointment. The family and/or caregiver verbally agrees with the care-plan and all of their questions have been answered. The discharge instructions have also been provided to the them with educational information regarding the patient's diagnosis as well a list of reasons why the patient would want to return to the ER prior to their follow-up appointment should their condition change. Solo San MD            This note will not be viewable in [address]

## 2020-09-25 NOTE — DISCHARGE INSTRUCTIONS
Patient Education        Hip Pain: Care Instructions  Your Care Instructions     Hip pain may be caused by many things, including overuse, a fall, or a twisting movement. Another cause of hip pain is arthritis. Your pain may increase when you stand up, walk, or squat. The pain may come and go or may be constant. Home treatment can help relieve hip pain, swelling, and stiffness. If your pain is ongoing, you may need more tests and treatment. Follow-up care is a key part of your treatment and safety. Be sure to make and go to all appointments, and call your doctor if you are having problems. It's also a good idea to know your test results and keep a list of the medicines you take. How can you care for yourself at home? · Take pain medicines exactly as directed. ? If the doctor gave you a prescription medicine for pain, take it as prescribed. ? If you are not taking a prescription pain medicine, ask your doctor if you can take an over-the-counter medicine. · Rest and protect your hip. Take a break from any activity, including standing or walking, that may cause pain. · Put ice or a cold pack against your hip for 10 to 20 minutes at a time. Try to do this every 1 to 2 hours for the next 3 days (when you are awake) or until the swelling goes down. Put a thin cloth between the ice and your skin. · Sleep on your healthy side with a pillow between your knees, or sleep on your back with pillows under your knees. · If there is no swelling, you can put moist heat, a heating pad, or a warm cloth on your hip. Do gentle stretching exercises to help keep your hip flexible. · Learn how to prevent falls. Have your vision and hearing checked regularly. Wear slippers or shoes with a nonskid sole. · Stay at a healthy weight. · Wear comfortable shoes. When should you call for help? Call 911 anytime you think you may need emergency care.  For example, call if:    · You have sudden chest pain and shortness of breath, or you cough up blood.     · You are not able to stand or walk or bear weight.     · Your buttocks, legs, or feet feel numb or tingly.     · Your leg or foot is cool or pale or changes color.     · You have severe pain. Call your doctor now or seek immediate medical care if:    · You have signs of infection, such as:  ? Increased pain, swelling, warmth, or redness in the hip area. ? Red streaks leading from the hip area. ? Pus draining from the hip area. ? A fever.     · You have signs of a blood clot, such as:  ? Pain in your calf, back of the knee, thigh, or groin. ? Redness and swelling in your leg or groin.     · You are not able to bend, straighten, or move your leg normally.     · You have trouble urinating or having bowel movements. Watch closely for changes in your health, and be sure to contact your doctor if:    · You do not get better as expected. Where can you learn more? Go to http://www.gray.com/  Enter Z720 in the search box to learn more about \"Hip Pain: Care Instructions. \"  Current as of: June 26, 2019               Content Version: 12.6  © 5727-1436 [employer]. Care instructions adapted under license by [employer] (which disclaims liability or warranty for this information). If you have questions about a medical condition or this instruction, always ask your healthcare professional. [place] [address] any warranty or liability for your use of this information. Patient Education        Joint Pain: Care Instructions  Your Care Instructions     Many people have small aches and pains from overuse or injury to muscles and joints. Joint injuries often happen during sports or recreation, work tasks, or projects around the home. An overuse injury can happen when you put too much stress on a joint or when you do an activity that stresses the joint over and over, such as using the computer or rowing a boat.   You can take action at home to help your muscles and joints get better. You should feel better in 1 to 2 weeks, but it can take 3 months or more to heal completely. Follow-up care is a key part of your treatment and safety. Be sure to make and go to all appointments, and call your doctor if you are having problems. It's also a good idea to know your test results and keep a list of the medicines you take. How can you care for yourself at home? · Do not put weight on the injured joint for at least a day or two. · For the first day or two after an injury, do not take hot showers or baths, and do not use hot packs. The heat could make swelling worse. · Put ice or a cold pack on the sore joint for 10 to 20 minutes at a time. Try to do this every 1 to 2 hours for the next 3 days (when you are awake) or until the swelling goes down. Put a thin cloth between the ice and your skin. · Wrap the injury in an elastic bandage. Do not wrap it too tightly because this can cause more swelling. · Prop up the sore joint on a pillow when you ice it or anytime you sit or lie down during the next 3 days. Try to keep it above the level of your heart. This will help reduce swelling. · Take an over-the-counter pain medicine, such as acetaminophen (Tylenol), ibuprofen (Advil, Motrin), or naproxen (Aleve). Read and follow all instructions on the label. · After 1 or 2 days of rest, begin moving the joint gently. While the joint is still healing, you can begin to exercise using activities that do not strain or hurt the painful joint. When should you call for help? Call your doctor now or seek immediate medical care if:    · You have signs of infection, such as:  ? Increased pain, swelling, warmth, and redness. ? Red streaks leading from the joint. ? A fever. Watch closely for changes in your health, and be sure to contact your doctor if:    · Your movement or symptoms are not getting better after 1 to 2 weeks of home treatment.    Where can you learn more? Go to http://www.gray.com/  Enter P205 in the search box to learn more about \"Joint Pain: Care Instructions. \"  Current as of: March 2, 2020               Content Version: 12.6  © 3155-5187 [employer]. Care instructions adapted under license by [employer] (which disclaims liability or warranty for this information). If you have questions about a medical condition or this instruction, always ask your healthcare professional. [address] any warranty or liability for your use of this information.

## 2020-09-25 NOTE — ED NOTES
Pt presents via ems to ed c/o left hip pain status post fall. Pt states passed out in her bathroom and fell on September 7th. She reports she was evaluated at this time and was not given a definitive diagnosis other than being told she did not have a fracture. Pt states she for the past few days she has had increased left sided hip shooting hip pain with radiation to her left leg and lower back. Pt reports her sx are worse when laying down and when attempting to get up. Of note, pt ambulated from ems stretcher to bed    Side rails up and call light within reach, pt instructed to call should she need anything. Pt denies cp, sob, abdominal pain, n,v,d, ha, blurry vision, numbness/tingling. 3:41 AM: Pt assisted to restroom      4:35 AM: I have reviewed discharge instructions with the patient. The patient verbalized understanding.

## 2020-10-09 ENCOUNTER — TELEPHONE (OUTPATIENT)
Dept: HEMATOLOGY | Age: 85
End: 2020-10-09

## 2020-10-14 ENCOUNTER — OFFICE VISIT (OUTPATIENT)
Dept: HEMATOLOGY | Age: 85
End: 2020-10-14
Payer: MEDICARE

## 2020-10-14 VITALS
TEMPERATURE: 96.9 F | DIASTOLIC BLOOD PRESSURE: 71 MMHG | OXYGEN SATURATION: 97 % | SYSTOLIC BLOOD PRESSURE: 137 MMHG | RESPIRATION RATE: 16 BRPM | BODY MASS INDEX: 31.1 KG/M2 | HEART RATE: 98 BPM | WEIGHT: 169 LBS | HEIGHT: 62 IN

## 2020-10-14 DIAGNOSIS — R74.8 ABNORMAL LIVER ENZYMES: Primary | ICD-10-CM

## 2020-10-14 DIAGNOSIS — Z11.59 ENCOUNTER FOR SCREENING FOR OTHER VIRAL DISEASES: ICD-10-CM

## 2020-10-14 PROCEDURE — G0463 HOSPITAL OUTPT CLINIC VISIT: HCPCS | Performed by: HOSPITALIST

## 2020-10-14 PROCEDURE — 99204 OFFICE O/P NEW MOD 45 MIN: CPT | Performed by: HOSPITALIST

## 2020-10-14 NOTE — PATIENT INSTRUCTIONS
We will obtain blood work today We perform  liver ultrasound Return to see me in the clinic in 4 weeks

## 2020-10-14 NOTE — LETTER
10/25/20 Patient: Mehnaz Garner YOB: 1932 Date of Visit: 10/14/2020 Ena Gore MD 
[facility] [employer] [facility] [address] [address] VIA In Basket Dear Ena Gore MD, [facility]. My notes for this consultation are attached. If you have questions, please do not hesitate to call me. I look forward to following your patient along with you. Sincerely, Adrianna Miller MD

## 2020-10-14 NOTE — PROGRESS NOTES
Room 1     Identified pt with two pt identifiers(name and ). Reviewed record in preparation for visit and have obtained necessary documentation. All patient medications has been reviewed. Chief Complaint   Patient presents with   [address]     seen at the request of Peter Malave MD    Hepatitis B       3 most recent PHQ Screens 10/14/2020   Little interest or pleasure in doing things Not at all   Feeling down, depressed, irritable, or hopeless Not at all   Total Score PHQ 2 0     Abuse Screening Questionnaire 10/14/2020   Do you ever feel afraid of your partner? N   Are you in a relationship with someone who physically or mentally threatens you? N   Is it safe for you to go home? Y       Health Maintenance Due   Topic    Foot Exam Q1     MICROALBUMIN Q1     Eye Exam Retinal or Dilated     Lipid Screen     DTaP/Tdap/Td series (1 - Tdap)    Shingrix Vaccine Age 50> (1 of 2)    GLAUCOMA SCREENING Q2Y     Pneumococcal 65+ years (1 of 1 - PPSV23)    Medicare Yearly Exam     Flu Vaccine (1)       Vitals:    10/14/20 1311   BP: 137/71   Pulse: 98   Resp: 16   Temp: 96.9 °F (36.1 °C)   TempSrc: Temporal   SpO2: 97%   Weight: 169 lb (76.7 kg)   Height: 5' 2\" (1.575 m)   PainSc:   0 - No pain       Wt Readings from Last 3 Encounters:   10/14/20 169 lb (76.7 kg)   20 157 lb 6.5 oz (71.4 kg)   20 157 lb 6.5 oz (71.4 kg)     Temp Readings from Last 3 Encounters:   10/14/20 96.9 °F (36.1 °C) (Temporal)   20 98.7 °F (37.1 °C)   20 97.9 °F (36.6 °C)     BP Readings from Last 3 Encounters:   10/14/20 137/71   20 (!) 142/78   20 (!) 154/72     Pulse Readings from Last 3 Encounters:   10/14/20 98   20 80   20 85       Coordination of Care Questionnaire:   1) Have you been to an emergency room, urgent care, or hospitalized since your last visit?    YES     Diagnosis     Diagnosis  Comment  Added By  Time Added  Team Role  Provider Specialty    Hip pain [M25.559] Roise Lanes, MD  9/25/2020  4:23 AM         2. Have seen or consulted any other health care provider since your last visit? NO    3) Do you have an Advanced Directive/ Living Will in place? NO  If yes, do we have a copy on file NO  If no, would you like information NO    Patient is accompanied by daughter I have received verbal consent from Yobany Pina to discuss any/all medical information while they are present in the room.

## 2020-10-14 NOTE — PROGRESS NOTES
Miguel Velez MD, Bentley Kruger MD, MPH      Aftab Rowley, PA-C    Kit Haji, ACNP-BC     Meredith Garrett, [facility]   WES KahnP-C    Diana Rush, [facility]       Jaems Reyes [address]    at [address], [address] [place] [address], Jessica  22.    [phone]    FAX: [address]    at [address], [address]    [phone]    FAX: [phone]     Patient Care Team:  Jose Manuel Fine MD as PCP - General (Family Medicine)    Problem List  Date Reviewed: 1/27/2020          Codes Class Noted    Status post total left knee replacement ICD-10-CM: V83.193  ICD-9-CM: V43.65  8/21/2018        Idiopathic small and large fiber sensory neuropathy ICD-10-CM: G60.8  ICD-9-CM: 356.4  5/15/2017        Diabetic peripheral neuropathy associated with type 2 diabetes mellitus ([facility] [address] ICD-10-CM: E11.42  ICD-9-CM: 250.60, 357.2  5/15/2017        B12 deficiency ICD-10-CM: E53.8  ICD-9-CM: 266.2  5/15/2017        Vitamin D deficiency ICD-10-CM: E55.9  ICD-9-CM: 268.9  5/15/2017        Bilateral carpal tunnel syndrome ICD-10-CM: G56.03  ICD-9-CM: 354.0  5/15/2017        Cervical radiculopathy due to osteoarthritis of spine ICD-10-CM: M47.22  ICD-9-CM: 721.0  5/15/2017        Lumbar back pain with radiculopathy affecting left lower extremity ICD-10-CM: M54.16  ICD-9-CM: 724.4  5/15/2017        Lumbar back pain with radiculopathy affecting right lower extremity ICD-10-CM: M54.16  ICD-9-CM: 724.4  5/15/2017        Low back pain ICD-10-CM: M54.5  ICD-9-CM: 724.2  5/15/2017        Primary osteoarthritis of right knee ICD-10-CM: M17.11  ICD-9-CM: 715.16  8/17/2016        Primary localized osteoarthritis of right knee ICD-10-CM: M17.11  ICD-9-CM: 715.16  8/17/2016              The clinicians listed above have asked me to see Zachary Singh in consultation regarding elevated liver enzymes and its management. All medical records sent by the referring physicians were reviewed including imaging studies. The patient is a 80 y.o. Black female who was found to have elevated  liver enzymes in 09/2020 during routine phsyicacl examination with primary care physician. Liver enzymes have remained persistently elevated. Liver function test performed in 09/2020 showed AST 62, ALT 84, ALKP 73, Total bili 0.4. Serologic makers for causes of chronic liver disease is positive for hepatitis A Ab, hepatitis B core Ab, and ASMA. The patient had not started any new medications within 3 months preceding the elevation in liver chemistries. She is on tylenol and vitamin centrum OTC. Patient does not have a history of liver disease. Imaging of the liver has not been done. An assessment of liver fibrosis with biopsy or elastography has not been performed. The patient has no symptoms which can be attributed to the liver disorder.,     The patient denies fatigue, pain in the right side over the liver, diffuse abdominal pain, arthralgias, myalgias, yellowing of the eyes or skin, itching,       The patient completes all daily activities without any functional limitations. ASSESSMENT AND PLAN:  Elevated liver enzymes  Persistent elevation in liver transaminases and ALKP of f unclear etiology at this time. Liver function is normal.  The platelet count is normal  Serologic testing for causes of chronic liver disease were positive for hepatitis A total Ab, Hepatitis B core Ab, and ASMA. Hepatitis Bs Ag negative. A1AT normal.  The most likely causes for the liver chemistry abnormalities is most likely fatty liver disease vs immune liver disease  Will perform laboratory testing to monitor liver function and degree of liver injury.     This included BMP, hepatic panel, CBC with platelet count. Will perform imaging of the liver with ultrasound. We will assess liver fibrosis with fibroscan at some point. ASMA positive: We will perform additional serologies including ARLETTE and serum immunoglobulin G to rule out AIH. This will be done on next clinic visit      Screening for Hepatocellular Carcinoma  [address] screening is not necessary if the patient has no evidence of cirrhosis. Treatment of other medical problems in patients with chronic liver disease  There are no contraindications for the patient to take most medications that are necessary for treatment of other medical issues. Vaccinations   Vaccination for viral hepatitis A and B is not needed. The patient has serologic evidence of prior exposure or vaccination with immunity. Since the patient does not have a chronic liver disease which can lead to liver injury screening for HAV and HBV is not needed. Routine vaccinations against other bacterial and viral agents can be performed as indicated. Annual flu vaccination should be administered if indicated. ALLERGIES  Allergies   Allergen Reactions    Codeine Other (comments)     fainting    Bee Pollen Other (comments)       MEDICATIONS  Current Outpatient Medications   Medication Sig    naproxen (NAPROSYN) 500 mg tablet Take 1 Tab by mouth every twelve (12) hours as needed for Pain.  acetaminophen (Tylenol Arthritis Pain) 650 mg TbER Take 1 Tab by mouth every eight (8) hours.  cyclobenzaprine (FLEXERIL) 5 mg tablet Take 1 Tab by mouth nightly as needed for Muscle Spasm(s) for up to 10 doses.  FreeStyle Michael 14 Day [place] [facility] USE AS DIRECTED QD    potassium chloride SR (KLOR-CON 10) 10 mEq tablet TK 1 T PO QD    tiZANidine (ZANAFLEX) 2 mg tablet TK 1 T PO QHS PRN    gabapentin (NEURONTIN) 300 mg capsule Take 1 Cap by mouth four (4) times daily. Max Daily Amount: 1,200 mg.  Indications: neuropathic pain    alendronate (FOSAMAX) 70 mg tablet Take 70 mg by mouth every seven (7) days.  b complex vitamins tablet Take 1 Tab by mouth daily.  cholecalciferol, VITAMIN D3, (VITAMIN D3) 5,000 unit tab tablet Take 5,000 Units by mouth daily.  cetirizine (ZYRTEC) 10 mg tablet Take  by mouth as needed for Allergies.  hydrochlorothiazide (HYDRODIURIL) 25 mg tablet Take 1 Tab by mouth daily. Restart when Blood Pressure is greater than 120/80    omeprazole (PRILOSEC) 20 mg capsule Take 20 mg by mouth daily.  citalopram (CELEXA) 20 mg tablet Take 20 mg by mouth daily.  atorvastatin (LIPITOR) 20 mg tablet Take 20 mg by mouth daily.  metFORMIN ER (GLUCOPHAGE XR) 500 mg tablet      No current facility-administered medications for this visit. SYSTEM REVIEW NOT RELATED TO LIVER DISEASE OR REVIEWED ABOVE:  Constitution systems: Negative for fever, chills, weight gain, weight loss. Eyes: Negative for visual changes. ENT: Negative for sore throat, painful swallowing. Respiratory: Negative for cough, hemoptysis, SOB. Cardiology: Negative for chest pain, palpitations. GI:  Negative for constipation or diarrhea. : Negative for urinary frequency, dysuria, hematuria, nocturia. Skin: Negative for rash. Hematology: Negative for easy bruising, blood clots. Musculo-skelatal: Negative for back pain, muscle pain, weakness. Neurologic: Negative for headaches, dizziness, vertigo, memory problems not related to HE. Psychology: Negative for anxiety, depression. FAMILY HISTORY:  Both parents are . There is no family history of liver disease        SOCIAL HISTORY:  The patient   Patient has 7 children. 2 are . She has 11 grand children and 8 great grand children   The patient has never used tobacco products. The patient consumes alcohol on social occasions never in excess. The patient is retired.            PHYSICAL EXAMINATION:  Visit Vitals  /71 (BP 1 Location: Left arm, BP Patient Position: Sitting)   Pulse 98   Temp 96.9 °F (36.1 °C) (Temporal)   Resp 16   Ht 5' 2\" (1.575 m)   Wt 76.7 kg (169 lb)   LMP  (LMP Unknown)   SpO2 97%   BMI 30.91 kg/m²     General: No acute distress. Eyes: Sclera anicteric. ENT: No oral lesions. Thyroid normal.  Nodes: No adenopathy. Skin: No spider angiomata. No jaundice. No palmar erythema. Respiratory: Lungs clear to auscultation. Cardiovascular: Regular heart rate. No murmurs. No JVD. Abdomen: Soft non-tender. Liver size normal to percussion/palpation. Spleen not palpable. No obvious ascites. Extremities: No edema. No muscle wasting. No gross arthritic changes. Neurologic: Alert and oriented. Cranial nerves grossly intact. No asterixis. LABORATORY STUDIES:  Recent liver function panel, CBC with platelet count and BMP are not available. These studies will be performed. Liver [place] of [address] Units 10/14/2020 9/7/2020   WBC 3.4 - 10.8 x10E3/uL 6.5 6.2   ANC 1.4 - 7.0 x10E3/uL 4.4 4.5   HGB 11.1 - 15.9 g/dL 11.9 12.3    - 450 x10E3/uL 267 236   INR 0.9 - 1.1       AST 0 - 40 IU/L 37 62 (H)   ALT 0 - 32 IU/L 32 84 (H)   Alk Phos 39 - 117 IU/L 131 (H) 73   Bili, Total 0.0 - 1.2 mg/dL 0.3 0.4   Albumin 3.6 - 4.6 g/dL 4.4 3.6   BUN 8 - 27 mg/dL 15 23 (H)   Creat 0.57 - 1.00 mg/dL 0.63 0.93   Na 134 - 144 mmol/L 141 139   K 3.5 - 5.2 mmol/L 4.0 3.5   Cl 96 - 106 mmol/L 104 106   CO2 20 - 29 mmol/L 19 (L) 29   Glucose 65 - 99 mg/dL 108 (H) 124 (H)     SEROLOGIES:  Not available or performed. Testing was performed today.    Serologies Latest Ref Rng & Units 10/14/2020   Hep A Ab, Total Negative Positive (A)   Hep B Surface Ag Negative Negative   Hep B Core Ab, Total Negative Positive (A)   Hep B Surface AB QL  Reactive   Hep C Ab 0.0 - 0.9 s/co ratio 0.2   Iron % Saturation 15 - 55 % 24   ARLETTE, IFA  Positive (A)   ARLETTE Pattern  1:1280 (H)   ASMCA 0 - 19 Units 35 (H)   Alpha-1 antitrypsin level 101 - 187 mg/dL 168 LIVER HISTOLOGY:  Not available or performed    ENDOSCOPIC PROCEDURES:  Not available or performed    RADIOLOGY:  Not available or performed    OTHER TESTING:  Not available or performed    FOLLOW-UP:  All of the issues listed above in the Assessment and Plan were discussed with the patient. All questions were answered. The patient expressed a clear understanding of the above. [address] [place] [address] in 4 weeks to review all data and determine the treatment plan.     Judah Carvajal MD, MPH  Advanced Hepatology  [facility] of [address] [facility] [address] [address] Iftikhar Richards, [address] [place] [address] 22.  [phone]  [address] [place] [address]

## 2020-10-15 LAB
A1AT SERPL-MCNC: 168 MG/DL (ref 101–187)
ACTIN IGG SERPL-ACNC: 35 UNITS (ref 0–19)
ALBUMIN SERPL-MCNC: 4.4 G/DL (ref 3.6–4.6)
ALBUMIN/GLOB SERPL: 1.1 {RATIO} (ref 1.2–2.2)
ALP SERPL-CCNC: 131 IU/L (ref 39–117)
ALT SERPL-CCNC: 32 IU/L (ref 0–32)
AST SERPL-CCNC: 37 IU/L (ref 0–40)
BASOPHILS # BLD AUTO: 0 X10E3/UL (ref 0–0.2)
BASOPHILS NFR BLD AUTO: 1 %
BILIRUB SERPL-MCNC: 0.3 MG/DL (ref 0–1.2)
BUN SERPL-MCNC: 15 MG/DL (ref 8–27)
BUN/CREAT SERPL: 24 (ref 12–28)
CALCIUM SERPL-MCNC: 9.9 MG/DL (ref 8.7–10.3)
CHLORIDE SERPL-SCNC: 104 MMOL/L (ref 96–106)
CO2 SERPL-SCNC: 19 MMOL/L (ref 20–29)
COMMENT, 144067: NORMAL
CREAT SERPL-MCNC: 0.63 MG/DL (ref 0.57–1)
EOSINOPHIL # BLD AUTO: 0.1 X10E3/UL (ref 0–0.4)
EOSINOPHIL NFR BLD AUTO: 1 %
ERYTHROCYTE [DISTWIDTH] IN BLOOD BY AUTOMATED COUNT: 16.6 % (ref 11.7–15.4)
GLOBULIN SER CALC-MCNC: 4 G/DL (ref 1.5–4.5)
GLUCOSE SERPL-MCNC: 108 MG/DL (ref 65–99)
HAV AB SER QL IA: POSITIVE
HBV CORE AB SERPL QL IA: POSITIVE
HBV SURFACE AB SER QL: REACTIVE
HBV SURFACE AG SERPL QL IA: NEGATIVE
HCT VFR BLD AUTO: 37.9 % (ref 34–46.6)
HCV AB S/CO SERPL IA: 0.2 S/CO RATIO (ref 0–0.9)
HGB BLD-MCNC: 11.9 G/DL (ref 11.1–15.9)
IMM GRANULOCYTES # BLD AUTO: 0 X10E3/UL (ref 0–0.1)
IMM GRANULOCYTES NFR BLD AUTO: 0 %
IRON SATN MFR SERPL: 24 % (ref 15–55)
IRON SERPL-MCNC: 72 UG/DL (ref 27–139)
LYMPHOCYTES # BLD AUTO: 1.4 X10E3/UL (ref 0.7–3.1)
LYMPHOCYTES NFR BLD AUTO: 21 %
MCH RBC QN AUTO: 23 PG (ref 26.6–33)
MCHC RBC AUTO-ENTMCNC: 31.4 G/DL (ref 31.5–35.7)
MCV RBC AUTO: 73 FL (ref 79–97)
MONOCYTES # BLD AUTO: 0.6 X10E3/UL (ref 0.1–0.9)
MONOCYTES NFR BLD AUTO: 9 %
NEUTROPHILS # BLD AUTO: 4.4 X10E3/UL (ref 1.4–7)
NEUTROPHILS NFR BLD AUTO: 68 %
PLATELET # BLD AUTO: 267 X10E3/UL (ref 150–450)
POTASSIUM SERPL-SCNC: 4 MMOL/L (ref 3.5–5.2)
PROT SERPL-MCNC: 8.4 G/DL (ref 6–8.5)
RBC # BLD AUTO: 5.17 X10E6/UL (ref 3.77–5.28)
SODIUM SERPL-SCNC: 141 MMOL/L (ref 134–144)
TIBC SERPL-MCNC: 296 UG/DL (ref 250–450)
UIBC SERPL-MCNC: 224 UG/DL (ref 118–369)
WBC # BLD AUTO: 6.5 X10E3/UL (ref 3.4–10.8)

## 2020-10-17 LAB
ANA HOMOGEN TITR SER: ABNORMAL {TITER}
ANA TITR SER IF: POSITIVE {TITER}
Lab: ABNORMAL

## 2020-11-12 ENCOUNTER — HOSPITAL ENCOUNTER (OUTPATIENT)
Dept: ULTRASOUND IMAGING | Age: 85
Discharge: HOME | End: 2020-11-12
Attending: HOSPITALIST
Payer: MEDICARE

## 2020-11-12 ENCOUNTER — OFFICE VISIT (OUTPATIENT)
Dept: HEMATOLOGY | Age: 85
End: 2020-11-12
Payer: MEDICARE

## 2020-11-12 VITALS
TEMPERATURE: 97.1 F | WEIGHT: 160 LBS | HEART RATE: 65 BPM | SYSTOLIC BLOOD PRESSURE: 123 MMHG | HEIGHT: 62 IN | OXYGEN SATURATION: 98 % | RESPIRATION RATE: 16 BRPM | BODY MASS INDEX: 29.44 KG/M2 | DIASTOLIC BLOOD PRESSURE: 65 MMHG

## 2020-11-12 DIAGNOSIS — R74.8 ABNORMAL LIVER ENZYMES: ICD-10-CM

## 2020-11-12 DIAGNOSIS — R74.8 ABNORMAL LIVER ENZYMES: Primary | ICD-10-CM

## 2020-11-12 PROCEDURE — 99215 OFFICE O/P EST HI 40 MIN: CPT | Performed by: HOSPITALIST

## 2020-11-12 PROCEDURE — G8427 DOCREV CUR MEDS BY ELIG CLIN: HCPCS | Performed by: HOSPITALIST

## 2020-11-12 PROCEDURE — G8510 SCR DEP NEG, NO PLAN REQD: HCPCS | Performed by: HOSPITALIST

## 2020-11-12 PROCEDURE — G8417 CALC BMI ABV UP PARAM F/U: HCPCS | Performed by: HOSPITALIST

## 2020-11-12 PROCEDURE — G0463 HOSPITAL OUTPT CLINIC VISIT: HCPCS | Performed by: HOSPITALIST

## 2020-11-12 PROCEDURE — 76700 US EXAM ABDOM COMPLETE: CPT

## 2020-11-12 PROCEDURE — 1100F PTFALLS ASSESS-DOCD GE2>/YR: CPT | Performed by: HOSPITALIST

## 2020-11-12 PROCEDURE — G8536 NO DOC ELDER MAL SCRN: HCPCS | Performed by: HOSPITALIST

## 2020-11-12 PROCEDURE — 3288F FALL RISK ASSESSMENT DOCD: CPT | Performed by: HOSPITALIST

## 2020-11-12 PROCEDURE — 1090F PRES/ABSN URINE INCON ASSESS: CPT | Performed by: HOSPITALIST

## 2020-11-12 RX ORDER — FAMOTIDINE 20 MG/1
TABLET, FILM COATED ORAL
COMMUNITY
Start: 2020-10-30 | End: 2021-02-25

## 2020-11-12 RX ORDER — FLASH GLUCOSE SENSOR
KIT MISCELLANEOUS
COMMUNITY
Start: 2020-10-18

## 2020-11-12 NOTE — PROGRESS NOTES
Monae Huff MD, Day Estevez MD, MPH      Pippa Mathew, CARMELLA Devi, [facility]-BC     Meredith Garrett, [facility]   Teddy Abdullahi FNP-JANIE Cruz, [facility]       James [facility] [address]    at [address], [address] [place] [address], [facility] 22.    [phone]    FAX: [address] [place] [address], [address]    [phone]    FAX: [phone]     Patient Care Team:  Mahdav Massey MD as PCP - General (Family Medicine)    Problem List  Date Reviewed: 10/25/2020          Codes Class Noted    Status post total left knee replacement ICD-10-CM: I29.466  ICD-9-CM: V43.65  8/21/2018        Idiopathic small and large fiber sensory neuropathy ICD-10-CM: G60.8  ICD-9-CM: 356.4  5/15/2017        Diabetic peripheral neuropathy associated with type 2 diabetes mellitus ([facility] ICD-10-CM: E11.42  ICD-9-CM: 250.60, 357.2  5/15/2017        B12 deficiency ICD-10-CM: E53.8  ICD-9-CM: 266.2  5/15/2017        Vitamin D deficiency ICD-10-CM: E55.9  ICD-9-CM: 268.9  5/15/2017        Bilateral carpal tunnel syndrome ICD-10-CM: G56.03  ICD-9-CM: 354.0  5/15/2017        Cervical radiculopathy due to osteoarthritis of spine ICD-10-CM: M47.22  ICD-9-CM: 721.0  5/15/2017        Lumbar back pain with radiculopathy affecting left lower extremity ICD-10-CM: M54.16  ICD-9-CM: 724.4  5/15/2017        Lumbar back pain with radiculopathy affecting right lower extremity ICD-10-CM: M54.16  ICD-9-CM: 724.4  5/15/2017        Low back pain ICD-10-CM: M54.5  ICD-9-CM: 724.2  5/15/2017        Primary osteoarthritis of right knee ICD-10-CM: M17.11  ICD-9-CM: 715.16  8/17/2016        Primary localized osteoarthritis of right knee ICD-10-CM: M17.11  ICD-9-CM: 715.16  8/17/2016             Peg Aline returns to the liver institute of [facility] for follow up and management of elevated liver enzymes secondary to presumed autoimmune hepatitis  All medical records sent by the referring physicians were reviewed including imaging studies. The patient is a 80 y.o. Black female who was found to have elevated  liver enzymes in 09/2020 during routine phsyicacl examination with primary care physician. Liver enzymes have remained persistently elevated. Liver function test performed in 09/2020 showed AST 62, ALT 84, ALKP 73, Total bili 0.4. Serologic makers for causes of chronic liver disease is positive for hepatitis A Ab, hepatitis B core Ab, and ASMA. The patient had not started any new medications within 3 months preceding the elevation in liver chemistries. She is on tylenol and vitamin centrum OTC. Patient does not have a history of liver disease. Imaging of the liver performed 10/2020 reviewed mild hepatic steatosis. Assessment of liver fibrosis by FibroScan or liver biopsy has not been done     The patient has no symptoms which can be attributed to the liver disorder.,     The patient denies fatigue, pain in the right side over the liver, diffuse abdominal pain, arthralgias, myalgias, yellowing of the eyes or skin, itching,     The patient completes all daily activities without any functional limitations. ASSESSMENT AND PLAN:  Elevated liver enzymes  Persistent elevation in liver transaminases and ALP of unclear etiology at this time. Serologic testing for causes of chronic liver disease was positive for hepatitis A total Ab, Hepatitis B core Ab, ARLETTE and ASMA. Hepatitis Bs Ag negative. A1AT normal.  The most likely causes for the liver chemistry abnormalities is most likely immune liver disease    The only way we can confirm autoimmune hepatitis is to do a liver biopsy. Patient does not want to proceed with a liver biopsy.   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 Suspect elevated liver enzymes to be due to autoimmune hepatitis. The only way we can confirm autoimmune hepatitis is to perform a liver biopsy. Patient does not wish to proceed with a liver biopsy. We will not be able to treat empirically with steroids or immunosuppression due to advanced age  We will monitor liver function test    Screening for Hepatocellular Carcinoma  HCC screening is not necessary if the patient has no evidence of cirrhosis. Treatment of other medical problems in patients with chronic liver disease  There are no contraindications for the patient to take most medications that are necessary for treatment of other medical issues. Vaccinations   Vaccination for viral hepatitis A and B is not needed. The patient has serologic evidence of prior exposure or vaccination with immunity. Routine vaccinations against other bacterial and viral agents can be performed as indicated. Annual flu vaccination should be administered if indicated. ALLERGIES  Allergies   Allergen Reactions    Codeine Other (comments)     fainting    Bee Pollen Other (comments)       MEDICATIONS  Current Outpatient Medications   Medication Sig    naproxen (NAPROSYN) 500 mg tablet Take 1 Tab by mouth every twelve (12) hours as needed for Pain.  acetaminophen (Tylenol Arthritis Pain) 650 mg TbER Take 1 Tab by mouth every eight (8) hours.  cyclobenzaprine (FLEXERIL) 5 mg tablet Take 1 Tab by mouth nightly as needed for Muscle Spasm(s) for up to 10 doses.     FreeStyle Michael 14 Day [place] mis USE AS DIRECTED QD    metFORMIN ER (GLUCOPHAGE XR) 500 mg tablet     potassium chloride SR (KLOR-CON 10) 10 mEq tablet TK 1 T PO QD    tiZANidine (ZANAFLEX) 2 mg tablet TK 1 T PO QHS PRN    gabapentin (NEURONTIN) 300 mg capsule Take 1 Cap by mouth four (4) times daily. Max Daily Amount: 1,200 mg. Indications: neuropathic pain    alendronate (FOSAMAX) 70 mg tablet Take 70 mg by mouth every seven (7) days.  b complex vitamins tablet Take 1 Tab by mouth daily.  cholecalciferol, VITAMIN D3, (VITAMIN D3) 5,000 unit tab tablet Take 5,000 Units by mouth daily.  cetirizine (ZYRTEC) 10 mg tablet Take  by mouth as needed for Allergies.  hydrochlorothiazide (HYDRODIURIL) 25 mg tablet Take 1 Tab by mouth daily. Restart when Blood Pressure is greater than 120/80    omeprazole (PRILOSEC) 20 mg capsule Take 20 mg by mouth daily.  citalopram (CELEXA) 20 mg tablet Take 20 mg by mouth daily.  atorvastatin (LIPITOR) 20 mg tablet Take 20 mg by mouth daily.  famotidine (PEPCID) 20 mg tablet TK 2 TS PO BID    FreeStyle Michael 14 Day Sensor kit USE QD UTD     No current facility-administered medications for this visit. SYSTEM REVIEW NOT RELATED TO LIVER DISEASE OR REVIEWED ABOVE:  Constitution systems: Negative for fever, chills, weight gain, weight loss. Eyes: Negative for visual changes. ENT: Negative for sore throat, painful swallowing. Respiratory: Negative for cough, hemoptysis, SOB. Cardiology: Negative for chest pain, palpitations. GI:  Negative for constipation or diarrhea. : Negative for urinary frequency, dysuria, hematuria, nocturia. Skin: Negative for rash. Hematology: Negative for easy bruising, blood clots. Musculo-skelatal: Negative for back pain, muscle pain, weakness. Neurologic: Negative for headaches, dizziness, vertigo, memory problems not related to HE. Psychology: Negative for anxiety, depression. FAMILY HISTORY:  Both parents are . There is no family history of liver disease    SOCIAL HISTORY:  The patient   Patient has 7 children. 2 are . She has 11 grand children and 8 great grand children   The patient has never used tobacco products.     The patient consumes alcohol on social occasions never in excess. The patient is retired. PHYSICAL EXAMINATION:  Visit Vitals  /65 (BP 1 Location: Right arm, BP Patient Position: Sitting)   Pulse 65   Temp 97.1 °F (36.2 °C) (Temporal)   Resp 16   Ht 5' 2\" (1.575 m)   Wt 160 lb (72.6 kg)   LMP  (LMP Unknown)   SpO2 98%   BMI 29.26 kg/m²     General: No acute distress. Eyes: Sclera anicteric. ENT: No oral lesions. Thyroid normal.  Nodes: No adenopathy. Skin: No spider angiomata. No jaundice. No palmar erythema. Respiratory: Lungs clear to auscultation. Cardiovascular: Regular heart rate. No murmurs. No JVD. Abdomen: Soft non-tender. Liver size normal to percussion/palpation. Spleen not palpable. No obvious ascites. Extremities: No edema. No muscle wasting. No gross arthritic changes. Neurologic: Alert and oriented. Cranial nerves grossly intact. No asterixis. LABORATORY STUDIES:  Recent liver function panel, CBC with platelet count and BMP are not available. These studies will be performed. Liver [place] of [address] Units 10/14/2020 9/7/2020   WBC 3.4 - 10.8 x10E3/uL 6.5 6.2   ANC 1.4 - 7.0 x10E3/uL 4.4 4.5   HGB 11.1 - 15.9 g/dL 11.9 12.3    - 450 x10E3/uL 267 236   INR 0.9 - 1.1       AST 0 - 40 IU/L 37 62 (H)   ALT 0 - 32 IU/L 32 84 (H)   Alk Phos 39 - 117 IU/L 131 (H) 73   Bili, Total 0.0 - 1.2 mg/dL 0.3 0.4   Albumin 3.6 - 4.6 g/dL 4.4 3.6   BUN 8 - 27 mg/dL 15 23 (H)   Creat 0.57 - 1.00 mg/dL 0.63 0.93   Na 134 - 144 mmol/L 141 139   K 3.5 - 5.2 mmol/L 4.0 3.5   Cl 96 - 106 mmol/L 104 106   CO2 20 - 29 mmol/L 19 (L) 29   Glucose 65 - 99 mg/dL 108 (H) 124 (H)     SEROLOGIES:  Not available or performed. Testing was performed today.    Serologies Latest Ref Rng & Units 10/14/2020   Hep A Ab, Total Negative Positive (A)   Hep B Surface Ag Negative Negative   Hep B Core Ab, Total Negative Positive (A)   Hep B Surface AB QL  Reactive   Hep C Ab 0.0 - 0.9 s/co ratio 0.2 Iron % Saturation 15 - 55 % 24   ARLETTE, IFA  Positive (A)   ARLETTE Pattern  1:1280 (H)   ASMCA 0 - 19 Units 35 (H)   Alpha-1 antitrypsin level 101 - 187 mg/dL 168     LIVER HISTOLOGY:  Not available or performed    ENDOSCOPIC PROCEDURES:  Not available or performed    RADIOLOGY:  10/2020; liver ultrasound; mild hepatic steatosis. OTHER TESTING:  Not available or performed    FOLLOW-UP:  All of the issues listed above in the Assessment and Plan were discussed with the patient. All questions were answered. The patient expressed a clear understanding of the above. [address] [place] [address] in 3 months for FibroScan and to review all data and determine the treatment plan.     Benny Mayers MD, MPH  Advanced Hepatology  [facility] of [address] [address] Victor Maurice, [address] [place] [address] Jessica Richards  22.  [phone]  [address] [facility]

## 2020-11-12 NOTE — PROGRESS NOTES
Identified pt with two pt identifiers(name and ). Reviewed record in preparation for visit and have obtained necessary documentation. Chief Complaint   Patient presents with    Elevated Liver Enzymes     F/U      Vitals:    20 1309   BP: 123/65   Pulse: 65   Resp: 16   Temp: 97.1 °F (36.2 °C)   TempSrc: Temporal   SpO2: 98%   Weight: 160 lb (72.6 kg)   Height: 5' 2\" (1.575 m)   PainSc:   0 - No pain   PainLoc: Abdomen       Health Maintenance Review: Patient reminded of \"due or due soon\" health maintenance. I have asked the patient to contact his/her primary care provider (PCP) for follow-up on his/her health maintenance. Coordination of Care Questionnaire:  :   1) Have you been to an emergency room, urgent care, or hospitalized since your last visit? If yes, where when, and reason for visit? no       2. Have seen or consulted any other health care provider since your last visit? If yes, where when, and reason for visit? NO       Patient is accompanied by self I have received verbal consent from Leila Herrmann to discuss any/all medical information while they are present in the room.

## 2020-11-12 NOTE — LETTER
12/18/2020 Patient: Bianka Patel YOB: 1932 Date of Visit: 11/12/2020 Medhat Finnegan MD 
[address] [phone] [id number] [address] [employer] Dear Medhat Finnegan MD, Thank you for referring Ms. Bianka Patel to [address] for evaluation. My notes for this consultation are attached. If you have questions, please do not hesitate to call me. I look forward to following your patient along with you. Sincerely, Cecy Villeda MD

## 2020-12-24 ENCOUNTER — TRANSCRIBE ORDER (OUTPATIENT)
Dept: SCHEDULING | Age: 85
End: 2020-12-24

## 2020-12-24 DIAGNOSIS — S32.030A COMPRESSION FRACTURE OF L3 LUMBAR VERTEBRA, CLOSED, INITIAL ENCOUNTER (HCC): Primary | ICD-10-CM

## 2021-01-02 ENCOUNTER — HOSPITAL ENCOUNTER (OUTPATIENT)
Dept: MRI IMAGING | Age: 86
Discharge: HOME | End: 2021-01-02
Attending: FAMILY MEDICINE
Payer: MEDICARE

## 2021-01-02 DIAGNOSIS — S32.030A COMPRESSION FRACTURE OF L3 LUMBAR VERTEBRA, CLOSED, INITIAL ENCOUNTER (HCC): ICD-10-CM

## 2021-01-02 PROCEDURE — 72148 MRI LUMBAR SPINE W/O DYE: CPT

## 2021-02-03 ENCOUNTER — TRANSCRIBE ORDER (OUTPATIENT)
Dept: SCHEDULING | Age: 86
End: 2021-02-03

## 2021-02-03 DIAGNOSIS — Z78.0 POST-MENOPAUSAL: Primary | ICD-10-CM

## 2021-02-22 ENCOUNTER — HOSPITAL ENCOUNTER (EMERGENCY)
Age: 86
Discharge: HOME | End: 2021-02-22
Attending: EMERGENCY MEDICINE
Payer: MEDICARE

## 2021-02-22 ENCOUNTER — APPOINTMENT (OUTPATIENT)
Dept: CT IMAGING | Age: 86
End: 2021-02-22
Attending: EMERGENCY MEDICINE
Payer: MEDICARE

## 2021-02-22 VITALS
HEART RATE: 100 BPM | HEIGHT: 63 IN | RESPIRATION RATE: 16 BRPM | OXYGEN SATURATION: 100 % | SYSTOLIC BLOOD PRESSURE: 139 MMHG | TEMPERATURE: 98 F | BODY MASS INDEX: 28.59 KG/M2 | DIASTOLIC BLOOD PRESSURE: 80 MMHG | WEIGHT: 161.38 LBS

## 2021-02-22 DIAGNOSIS — R10.9 ACUTE ABDOMINAL PAIN: Primary | ICD-10-CM

## 2021-02-22 DIAGNOSIS — S32.000A COMPRESSION FRACTURE OF LUMBAR VERTEBRA, INITIAL ENCOUNTER, UNSPECIFIED LUMBAR VERTEBRAL LEVEL: ICD-10-CM

## 2021-02-22 DIAGNOSIS — R11.0 NAUSEA WITHOUT VOMITING: ICD-10-CM

## 2021-02-22 DIAGNOSIS — K57.90 DIVERTICULOSIS: ICD-10-CM

## 2021-02-22 LAB
ALBUMIN SERPL-MCNC: 3.5 G/DL (ref 3.5–5)
ALBUMIN/GLOB SERPL: 0.7 {RATIO} (ref 1.1–2.2)
ALP SERPL-CCNC: 90 U/L (ref 45–117)
ALT SERPL-CCNC: 32 U/L (ref 12–78)
ANION GAP SERPL CALC-SCNC: 6 MMOL/L (ref 5–15)
APPEARANCE UR: CLEAR
AST SERPL-CCNC: 28 U/L (ref 15–37)
BACTERIA URNS QL MICRO: NEGATIVE /HPF
BILIRUB SERPL-MCNC: 0.3 MG/DL (ref 0.2–1)
BILIRUB UR QL: NEGATIVE
BUN SERPL-MCNC: 13 MG/DL (ref 6–20)
BUN/CREAT SERPL: 17 (ref 12–20)
CALCIUM SERPL-MCNC: 9.3 MG/DL (ref 8.5–10.1)
CHLORIDE SERPL-SCNC: 104 MMOL/L (ref 97–108)
CO2 SERPL-SCNC: 27 MMOL/L (ref 21–32)
COLOR UR: NORMAL
CREAT SERPL-MCNC: 0.77 MG/DL (ref 0.55–1.02)
EPITH CASTS URNS QL MICRO: NORMAL /LPF
ERYTHROCYTE [DISTWIDTH] IN BLOOD BY AUTOMATED COUNT: 14.6 % (ref 11.5–14.5)
GLOBULIN SER CALC-MCNC: 4.9 G/DL (ref 2–4)
GLUCOSE SERPL-MCNC: 104 MG/DL (ref 65–100)
GLUCOSE UR STRIP.AUTO-MCNC: NEGATIVE MG/DL
HCT VFR BLD AUTO: 40.9 % (ref 35–47)
HGB BLD-MCNC: 12.4 G/DL (ref 11.5–16)
HGB UR QL STRIP: NEGATIVE
KETONES UR QL STRIP.AUTO: NEGATIVE MG/DL
LEUKOCYTE ESTERASE UR QL STRIP.AUTO: NEGATIVE
LIPASE SERPL-CCNC: 114 U/L (ref 73–393)
MCH RBC QN AUTO: 23.2 PG (ref 26–34)
MCHC RBC AUTO-ENTMCNC: 30.3 G/DL (ref 30–36.5)
MCV RBC AUTO: 76.6 FL (ref 80–99)
NITRITE UR QL STRIP.AUTO: NEGATIVE
NRBC # BLD: 0 K/UL (ref 0–0.01)
NRBC BLD-RTO: 0 PER 100 WBC
PH UR STRIP: 7.5 [PH] (ref 5–8)
PLATELET # BLD AUTO: 314 K/UL (ref 150–400)
PMV BLD AUTO: 9.8 FL (ref 8.9–12.9)
POTASSIUM SERPL-SCNC: 4 MMOL/L (ref 3.5–5.1)
PROT SERPL-MCNC: 8.4 G/DL (ref 6.4–8.2)
PROT UR STRIP-MCNC: NEGATIVE MG/DL
RBC # BLD AUTO: 5.34 M/UL (ref 3.8–5.2)
RBC #/AREA URNS HPF: NORMAL /HPF (ref 0–5)
SODIUM SERPL-SCNC: 137 MMOL/L (ref 136–145)
SP GR UR REFRACTOMETRY: 1.02 (ref 1–1.03)
UA: UC IF INDICATED,UAUC: NORMAL
UROBILINOGEN UR QL STRIP.AUTO: 1 EU/DL (ref 0.2–1)
WBC # BLD AUTO: 5.3 K/UL (ref 3.6–11)
WBC URNS QL MICRO: NORMAL /HPF (ref 0–4)

## 2021-02-22 PROCEDURE — 36415 COLL VENOUS BLD VENIPUNCTURE: CPT

## 2021-02-22 PROCEDURE — 83690 ASSAY OF LIPASE: CPT

## 2021-02-22 PROCEDURE — 80053 COMPREHEN METABOLIC PANEL: CPT

## 2021-02-22 PROCEDURE — 74177 CT ABD & PELVIS W/CONTRAST: CPT

## 2021-02-22 PROCEDURE — 85027 COMPLETE CBC AUTOMATED: CPT

## 2021-02-22 PROCEDURE — 74011250636 HC RX REV CODE- 250/636: Performed by: EMERGENCY MEDICINE

## 2021-02-22 PROCEDURE — 74011000636 HC RX REV CODE- 636: Performed by: EMERGENCY MEDICINE

## 2021-02-22 PROCEDURE — 96374 THER/PROPH/DIAG INJ IV PUSH: CPT

## 2021-02-22 PROCEDURE — 81001 URINALYSIS AUTO W/SCOPE: CPT

## 2021-02-22 PROCEDURE — 99284 EMERGENCY DEPT VISIT MOD MDM: CPT

## 2021-02-22 RX ORDER — NAPROXEN 500 MG/1
500 TABLET ORAL 2 TIMES DAILY WITH MEALS
Qty: 20 TAB | Refills: 0 | Status: SHIPPED | OUTPATIENT
Start: 2021-02-22

## 2021-02-22 RX ORDER — METHOCARBAMOL 750 MG/1
750 TABLET, FILM COATED ORAL 3 TIMES DAILY
Qty: 15 TAB | Refills: 0 | OUTPATIENT
Start: 2021-02-22 | End: 2021-02-25

## 2021-02-22 RX ORDER — KETOROLAC TROMETHAMINE 30 MG/ML
15 INJECTION, SOLUTION INTRAMUSCULAR; INTRAVENOUS
Status: COMPLETED | OUTPATIENT
Start: 2021-02-22 | End: 2021-02-22

## 2021-02-22 RX ADMIN — IOPAMIDOL 100 ML: 755 INJECTION, SOLUTION INTRAVENOUS at 18:19

## 2021-02-22 RX ADMIN — KETOROLAC TROMETHAMINE 15 MG: 30 INJECTION, SOLUTION INTRAMUSCULAR at 16:20

## 2021-02-22 NOTE — DISCHARGE INSTRUCTIONS
Thank you for allowing us to take care of you today! We hope we addressed all of your concerns and needs. We strive to provide excellent quality care in the Emergency Department. You will receive a survey after your visit to evaluate the care you were provided. Should you receive a survey from us, we invite you to share your experience and tell us what made it excellent. It was a pleasure serving you, we invite you to share your experience with us, in our pursuit for excellence, should you be selected to receive a survey. The exam and treatment you received in the Emergency Department were for an urgent problem and are not intended as complete care. It is important that you follow up with a doctor, nurse practitioner, or physician assistant for ongoing care. If your symptoms become worse or you do not improve as expected and you are unable to reach your usual health care provider, you should return to the Emergency Department. We are available 24 hours a day. Please take your discharge instructions with you when you go to your follow-up appointment. If you have any problem arranging a follow-up appointment, contact the Emergency Department immediately. If a prescription has been provided, please have it filled as soon as possible to prevent a delay in treatment. Read the entire medication instruction sheet provided to you by the pharmacy. If you have any questions or reservations about taking the medication due to side effects or interactions with other medications, please call your primary care physician or contact the ER to speak with the charge nurse. Make an appointment with your family doctor or the physician you were referred to for follow-up of this visit as instructed on your discharge paperwork, as this is mandatory follow-up. Return to the ER if you are unable to be seen or if you are unable to be seen in a timely manner.     If you have any problem arranging the follow-up visit, contact the Emergency Department immediately. I hope you feel better and thank you again for allow us to provide you with excellent care today at [facility]!       Warmest regards,    Mervin Oneil MD  Emergency Medicine Physician  [facility]      _____________________________________________________________________________________________________________    Vitals:    02/22/21 1700 02/22/21 1715 02/22/21 1730 02/22/21 1745   BP: 134/71  132/73    BP 1 Location:       BP Patient Position:       Pulse:       Resp:       Temp:       SpO2: 98% 99% 98% 99%   Weight:       Height:           Recent Results (from the past 12 hour(s))   CBC W/O DIFF    Collection Time: 02/22/21  3:35 PM   Result Value Ref Range    WBC 5.3 3.6 - 11.0 K/uL    RBC 5.34 (H) 3.80 - 5.20 M/uL    HGB 12.4 11.5 - 16.0 g/dL    HCT 40.9 35.0 - 47.0 %    MCV 76.6 (L) 80.0 - 99.0 FL    MCH 23.2 (L) 26.0 - 34.0 PG    MCHC 30.3 30.0 - 36.5 g/dL    RDW 14.6 (H) 11.5 - 14.5 %    PLATELET 894 058 - 894 K/uL    MPV 9.8 8.9 - 12.9 FL    NRBC 0.0 0  WBC    ABSOLUTE NRBC 0.00 0.00 - 0.01 K/uL   LIPASE    Collection Time: 02/22/21  3:35 PM   Result Value Ref Range    Lipase 114 73 - 393 U/L   URINALYSIS W/ REFLEX CULTURE    Collection Time: 02/22/21  3:35 PM    Specimen: Urine   Result Value Ref Range    Color YELLOW/STRAW      Appearance CLEAR CLEAR      Specific gravity 1.018 1.003 - 1.030      pH (UA) 7.5 5.0 - 8.0      Protein Negative NEG mg/dL    Glucose Negative NEG mg/dL    Ketone Negative NEG mg/dL    Bilirubin Negative NEG      Blood Negative NEG      Urobilinogen 1.0 0.2 - 1.0 EU/dL    Nitrites Negative NEG      Leukocyte Esterase Negative NEG      WBC 0-4 0 - 4 /hpf    RBC 0-5 0 - 5 /hpf    Epithelial cells FEW FEW /lpf    Bacteria Negative NEG /hpf    UA:UC IF INDICATED CULTURE NOT INDICATED BY UA RESULT CNI     METABOLIC PANEL, COMPREHENSIVE    Collection Time: 02/22/21  4:23 PM   Result Value Ref Range    Sodium 137 136 - 145 mmol/L    Potassium 4.0 3.5 - 5.1 mmol/L    Chloride 104 97 - 108 mmol/L    CO2 27 21 - 32 mmol/L    Anion gap 6 5 - 15 mmol/L    Glucose 104 (H) 65 - 100 mg/dL    BUN 13 6 - 20 MG/DL    Creatinine 0.77 0.55 - 1.02 MG/DL    BUN/Creatinine ratio 17 12 - 20      GFR est AA >60 >60 ml/min/1.73m2    GFR est non-AA >60 >60 ml/min/1.73m2    Calcium 9.3 8.5 - 10.1 MG/DL    Bilirubin, total 0.3 0.2 - 1.0 MG/DL    ALT (SGPT) 32 12 - 78 U/L    AST (SGOT) 28 15 - 37 U/L    Alk. phosphatase 90 45 - 117 U/L    Protein, total 8.4 (H) 6.4 - 8.2 g/dL    Albumin 3.5 3.5 - 5.0 g/dL    Globulin 4.9 (H) 2.0 - 4.0 g/dL    A-G Ratio 0.7 (L) 1.1 - 2.2         CT ABD PELV W CONT   Final Result   No acute process. CT Results  (Last 48 hours)                 02/22/21 1819  CT ABD PELV W CONT Final result    Impression:  No acute process. Narrative:  CT ABDOMEN AND PELVIS WITH CONTRAST. 2/22/2021 6:19 PM        INDICATION: Acute right lower quadrant abdominal pain, nausea. COMPARISON: CT chest 8/15/2019. TECHNIQUE: CT of the abdomen and pelvis was performed after the administration   100 cc IV [id number]. CT dose reduction was achieved through use of a   standardized protocol tailored for this examination and automatic exposure   control for dose modulation. FINDINGS:   Abdomen: Mild biliary ductal dilation is likely physiologic post   cholecystectomy. The heart is at the upper limits of normal in size. There is a   tiny hiatal hernia. Incidental note is made of a large fourth portion duodenal   diverticulum. The stomach, liver, pancreas, spleen, adrenals, and kidneys are   normal.       Pelvis: Diverticulosis is extensive throughout the colon. There is moderate   distal and extensive terminal ileal diverticulosis as well. The jejunum and   bladder are normal. No free air or fluid, and no abdominopelvic lymphadenopathy. Post hysterectomy. An L2 compression fracture was partially imaged on prior chest CT. An L3   compression fracture is post kyphoplasty.

## 2021-02-22 NOTE — ED NOTES
Pt complains of mid-abdomen pain, constant, without n/v x 4 days. Pt also complains of having \"a lot of gas\". She reports scratching her umbilicus and wiping some blood from it when she took a shower this morning, but on examination, no abrasion or excoriation is noted.
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
## 2021-02-23 NOTE — ED NOTES
Jose Parish RN reviewed discharge instructions with the patient. The patient verbalized understanding. All questions and concerns were addressed. The patient is discharged via wheelchair in the care of family members with instructions and prescriptions in hand. Pt is alert and oriented x 4. Respirations are clear and unlabored.

## 2021-02-25 ENCOUNTER — HOSPITAL ENCOUNTER (EMERGENCY)
Age: 86
Discharge: HOME | End: 2021-02-25
Attending: EMERGENCY MEDICINE
Payer: MEDICARE

## 2021-02-25 ENCOUNTER — APPOINTMENT (OUTPATIENT)
Dept: GENERAL RADIOLOGY | Age: 86
End: 2021-02-25
Attending: STUDENT IN AN ORGANIZED HEALTH CARE EDUCATION/TRAINING PROGRAM
Payer: MEDICARE

## 2021-02-25 ENCOUNTER — APPOINTMENT (OUTPATIENT)
Dept: GENERAL RADIOLOGY | Age: 86
End: 2021-02-25
Attending: EMERGENCY MEDICINE
Payer: MEDICARE

## 2021-02-25 VITALS
WEIGHT: 160 LBS | DIASTOLIC BLOOD PRESSURE: 73 MMHG | HEIGHT: 63 IN | SYSTOLIC BLOOD PRESSURE: 133 MMHG | TEMPERATURE: 98.4 F | BODY MASS INDEX: 28.35 KG/M2 | OXYGEN SATURATION: 97 % | HEART RATE: 83 BPM | RESPIRATION RATE: 16 BRPM

## 2021-02-25 DIAGNOSIS — R10.13 ABDOMINAL PAIN, EPIGASTRIC: Primary | ICD-10-CM

## 2021-02-25 DIAGNOSIS — R07.9 CHEST PAIN, UNSPECIFIED TYPE: ICD-10-CM

## 2021-02-25 LAB
ALBUMIN SERPL-MCNC: 3.7 G/DL (ref 3.5–5)
ALBUMIN/GLOB SERPL: 0.8 {RATIO} (ref 1.1–2.2)
ALP SERPL-CCNC: 74 U/L (ref 45–117)
ALT SERPL-CCNC: 30 U/L (ref 12–78)
ANION GAP SERPL CALC-SCNC: 6 MMOL/L (ref 5–15)
AST SERPL-CCNC: 24 U/L (ref 15–37)
ATRIAL RATE: 74 BPM
BASOPHILS # BLD: 0 K/UL (ref 0–0.1)
BASOPHILS NFR BLD: 0 % (ref 0–1)
BILIRUB SERPL-MCNC: 0.3 MG/DL (ref 0.2–1)
BUN SERPL-MCNC: 19 MG/DL (ref 6–20)
BUN/CREAT SERPL: 23 (ref 12–20)
CALCIUM SERPL-MCNC: 9.7 MG/DL (ref 8.5–10.1)
CALCULATED P AXIS, ECG09: 41 DEGREES
CALCULATED R AXIS, ECG10: -19 DEGREES
CALCULATED T AXIS, ECG11: 16 DEGREES
CHLORIDE SERPL-SCNC: 105 MMOL/L (ref 97–108)
CO2 SERPL-SCNC: 27 MMOL/L (ref 21–32)
CREAT SERPL-MCNC: 0.84 MG/DL (ref 0.55–1.02)
DIAGNOSIS, 93000: NORMAL
DIFFERENTIAL METHOD BLD: ABNORMAL
EOSINOPHIL # BLD: 0.1 K/UL (ref 0–0.4)
EOSINOPHIL NFR BLD: 1 % (ref 0–7)
ERYTHROCYTE [DISTWIDTH] IN BLOOD BY AUTOMATED COUNT: 14.5 % (ref 11.5–14.5)
GLOBULIN SER CALC-MCNC: 4.7 G/DL (ref 2–4)
GLUCOSE SERPL-MCNC: 121 MG/DL (ref 65–100)
HCT VFR BLD AUTO: 38.4 % (ref 35–47)
HGB BLD-MCNC: 11.9 G/DL (ref 11.5–16)
IMM GRANULOCYTES # BLD AUTO: 0 K/UL (ref 0–0.04)
IMM GRANULOCYTES NFR BLD AUTO: 0 % (ref 0–0.5)
LIPASE SERPL-CCNC: 163 U/L (ref 73–393)
LYMPHOCYTES # BLD: 1.5 K/UL (ref 0.8–3.5)
LYMPHOCYTES NFR BLD: 30 % (ref 12–49)
MCH RBC QN AUTO: 23.5 PG (ref 26–34)
MCHC RBC AUTO-ENTMCNC: 31 G/DL (ref 30–36.5)
MCV RBC AUTO: 75.7 FL (ref 80–99)
MONOCYTES # BLD: 0.5 K/UL (ref 0–1)
MONOCYTES NFR BLD: 11 % (ref 5–13)
NEUTS SEG # BLD: 2.8 K/UL (ref 1.8–8)
NEUTS SEG NFR BLD: 58 % (ref 32–75)
NRBC # BLD: 0 K/UL (ref 0–0.01)
NRBC BLD-RTO: 0 PER 100 WBC
P-R INTERVAL, ECG05: 158 MS
PLATELET # BLD AUTO: 277 K/UL (ref 150–400)
PMV BLD AUTO: 9.9 FL (ref 8.9–12.9)
POTASSIUM SERPL-SCNC: 4.1 MMOL/L (ref 3.5–5.1)
PROT SERPL-MCNC: 8.4 G/DL (ref 6.4–8.2)
Q-T INTERVAL, ECG07: 424 MS
QRS DURATION, ECG06: 122 MS
QTC CALCULATION (BEZET), ECG08: 470 MS
RBC # BLD AUTO: 5.07 M/UL (ref 3.8–5.2)
SODIUM SERPL-SCNC: 138 MMOL/L (ref 136–145)
TROPONIN I SERPL-MCNC: <0.05 NG/ML
VENTRICULAR RATE, ECG03: 74 BPM
WBC # BLD AUTO: 4.9 K/UL (ref 3.6–11)

## 2021-02-25 PROCEDURE — 85025 COMPLETE CBC W/AUTO DIFF WBC: CPT

## 2021-02-25 PROCEDURE — 74022 RADEX COMPL AQT ABD SERIES: CPT

## 2021-02-25 PROCEDURE — 84484 ASSAY OF TROPONIN QUANT: CPT

## 2021-02-25 PROCEDURE — 80053 COMPREHEN METABOLIC PANEL: CPT

## 2021-02-25 PROCEDURE — 74011250637 HC RX REV CODE- 250/637: Performed by: EMERGENCY MEDICINE

## 2021-02-25 PROCEDURE — 93005 ELECTROCARDIOGRAM TRACING: CPT

## 2021-02-25 PROCEDURE — 99281 EMR DPT VST MAYX REQ PHY/QHP: CPT

## 2021-02-25 PROCEDURE — 99284 EMERGENCY DEPT VISIT MOD MDM: CPT

## 2021-02-25 PROCEDURE — 83690 ASSAY OF LIPASE: CPT

## 2021-02-25 PROCEDURE — 74011000250 HC RX REV CODE- 250: Performed by: EMERGENCY MEDICINE

## 2021-02-25 PROCEDURE — 71045 X-RAY EXAM CHEST 1 VIEW: CPT

## 2021-02-25 PROCEDURE — 36415 COLL VENOUS BLD VENIPUNCTURE: CPT

## 2021-02-25 RX ORDER — FAMOTIDINE 20 MG/1
20 TABLET, FILM COATED ORAL 2 TIMES DAILY
Qty: 60 TAB | Refills: 0 | OUTPATIENT
Start: 2021-02-25 | End: 2021-02-27

## 2021-02-25 RX ORDER — DIAZEPAM 5 MG/1
2.5 TABLET ORAL
Status: COMPLETED | OUTPATIENT
Start: 2021-02-25 | End: 2021-02-25

## 2021-02-25 RX ORDER — LIDOCAINE HYDROCHLORIDE 20 MG/ML
10 SOLUTION OROPHARYNGEAL AS NEEDED
Qty: 1 BOTTLE | Refills: 0 | Status: SHIPPED | OUTPATIENT
Start: 2021-02-25

## 2021-02-25 RX ORDER — METHOCARBAMOL 750 MG/1
750 TABLET, FILM COATED ORAL
Qty: 20 TAB | Refills: 0 | Status: SHIPPED | OUTPATIENT
Start: 2021-02-25

## 2021-02-25 RX ADMIN — LIDOCAINE HYDROCHLORIDE 40 ML: 20 SOLUTION ORAL; TOPICAL at 14:26

## 2021-02-25 RX ADMIN — DIAZEPAM 2.5 MG: 5 TABLET ORAL at 17:55

## 2021-02-25 NOTE — ED NOTES
Easton Neumann reviewed discharge instructions with the patient. The patient verbalized understanding. All questions and concerns were addressed. The patient departed by a wheelchair and discharged ambulatory in the care of family members with instructions and prescriptions in hand. Pt is alert and oriented x 4. Respirations are clear and unlabored.

## 2021-02-26 RX ORDER — LIDOCAINE HYDROCHLORIDE 20 MG/ML
15 SOLUTION OROPHARYNGEAL AS NEEDED
Qty: 1 BOTTLE | Refills: 0 | Status: SHIPPED | OUTPATIENT
Start: 2021-02-26

## 2021-02-27 ENCOUNTER — HOSPITAL ENCOUNTER (EMERGENCY)
Age: 86
Discharge: HOME | End: 2021-02-27
Attending: EMERGENCY MEDICINE
Payer: MEDICARE

## 2021-02-27 VITALS
HEART RATE: 79 BPM | TEMPERATURE: 98 F | SYSTOLIC BLOOD PRESSURE: 144 MMHG | RESPIRATION RATE: 16 BRPM | OXYGEN SATURATION: 100 % | DIASTOLIC BLOOD PRESSURE: 61 MMHG

## 2021-02-27 DIAGNOSIS — R10.13 ABDOMINAL PAIN, EPIGASTRIC: Primary | ICD-10-CM

## 2021-02-27 DIAGNOSIS — K44.9 HIATAL HERNIA: ICD-10-CM

## 2021-02-27 LAB
ALBUMIN SERPL-MCNC: 3.8 G/DL (ref 3.5–5)
ALBUMIN/GLOB SERPL: 0.8 {RATIO} (ref 1.1–2.2)
ALP SERPL-CCNC: 74 U/L (ref 45–117)
ALT SERPL-CCNC: 28 U/L (ref 12–78)
ANION GAP SERPL CALC-SCNC: 7 MMOL/L (ref 5–15)
AST SERPL-CCNC: 52 U/L (ref 15–37)
BASOPHILS # BLD: 0 K/UL (ref 0–0.1)
BASOPHILS NFR BLD: 0 % (ref 0–1)
BILIRUB SERPL-MCNC: 0.4 MG/DL (ref 0.2–1)
BUN SERPL-MCNC: 13 MG/DL (ref 6–20)
BUN/CREAT SERPL: 19 (ref 12–20)
CALCIUM SERPL-MCNC: 9 MG/DL (ref 8.5–10.1)
CHLORIDE SERPL-SCNC: 105 MMOL/L (ref 97–108)
CO2 SERPL-SCNC: 26 MMOL/L (ref 21–32)
COMMENT, HOLDF: NORMAL
CREAT SERPL-MCNC: 0.7 MG/DL (ref 0.55–1.02)
DIFFERENTIAL METHOD BLD: ABNORMAL
EOSINOPHIL # BLD: 0 K/UL (ref 0–0.4)
EOSINOPHIL NFR BLD: 1 % (ref 0–7)
ERYTHROCYTE [DISTWIDTH] IN BLOOD BY AUTOMATED COUNT: 14.6 % (ref 11.5–14.5)
GLOBULIN SER CALC-MCNC: 4.9 G/DL (ref 2–4)
GLUCOSE SERPL-MCNC: 100 MG/DL (ref 65–100)
HCT VFR BLD AUTO: 38.5 % (ref 35–47)
HGB BLD-MCNC: 12 G/DL (ref 11.5–16)
IMM GRANULOCYTES # BLD AUTO: 0 K/UL (ref 0–0.04)
IMM GRANULOCYTES NFR BLD AUTO: 0 % (ref 0–0.5)
LIPASE SERPL-CCNC: 182 U/L (ref 73–393)
LYMPHOCYTES # BLD: 1.7 K/UL (ref 0.8–3.5)
LYMPHOCYTES NFR BLD: 33 % (ref 12–49)
MCH RBC QN AUTO: 23.5 PG (ref 26–34)
MCHC RBC AUTO-ENTMCNC: 31.2 G/DL (ref 30–36.5)
MCV RBC AUTO: 75.5 FL (ref 80–99)
MONOCYTES # BLD: 0.7 K/UL (ref 0–1)
MONOCYTES NFR BLD: 13 % (ref 5–13)
NEUTS SEG # BLD: 2.7 K/UL (ref 1.8–8)
NEUTS SEG NFR BLD: 53 % (ref 32–75)
NRBC # BLD: 0 K/UL (ref 0–0.01)
NRBC BLD-RTO: 0 PER 100 WBC
PLATELET # BLD AUTO: 261 K/UL (ref 150–400)
PMV BLD AUTO: 10 FL (ref 8.9–12.9)
POTASSIUM SERPL-SCNC: 4.9 MMOL/L (ref 3.5–5.1)
PROT SERPL-MCNC: 8.7 G/DL (ref 6.4–8.2)
RBC # BLD AUTO: 5.1 M/UL (ref 3.8–5.2)
SAMPLES BEING HELD,HOLD: NORMAL
SODIUM SERPL-SCNC: 138 MMOL/L (ref 136–145)
TROPONIN I SERPL-MCNC: <0.05 NG/ML
WBC # BLD AUTO: 5.1 K/UL (ref 3.6–11)

## 2021-02-27 PROCEDURE — 74011250637 HC RX REV CODE- 250/637: Performed by: PHYSICIAN ASSISTANT

## 2021-02-27 PROCEDURE — 93005 ELECTROCARDIOGRAM TRACING: CPT

## 2021-02-27 PROCEDURE — 80053 COMPREHEN METABOLIC PANEL: CPT

## 2021-02-27 PROCEDURE — 99284 EMERGENCY DEPT VISIT MOD MDM: CPT

## 2021-02-27 PROCEDURE — 83690 ASSAY OF LIPASE: CPT

## 2021-02-27 PROCEDURE — 84484 ASSAY OF TROPONIN QUANT: CPT

## 2021-02-27 PROCEDURE — 36415 COLL VENOUS BLD VENIPUNCTURE: CPT

## 2021-02-27 PROCEDURE — 85025 COMPLETE CBC W/AUTO DIFF WBC: CPT

## 2021-02-27 RX ORDER — OMEPRAZOLE 40 MG/1
40 CAPSULE, DELAYED RELEASE ORAL
Qty: 14 CAP | Refills: 0 | OUTPATIENT
Start: 2021-02-27 | End: 2021-02-27

## 2021-02-27 RX ORDER — SUCRALFATE 1 G/10ML
1 SUSPENSION ORAL 4 TIMES DAILY
Qty: 200 ML | Refills: 0 | Status: SHIPPED | OUTPATIENT
Start: 2021-02-27 | End: 2021-03-09

## 2021-02-27 RX ADMIN — ALUMINUM HYDROXIDE AND MAGNESIUM HYDROXIDE 30 ML: 200; 200 SUSPENSION ORAL at 19:33

## 2021-02-27 NOTE — ED TRIAGE NOTES
Patient arrives from home d/t medial abdominal pain x 6 days. Denies N/V/D. Reports she has been seen at another [facility] ER and they didn't know what was going on. She is unsure what medicine they gave her. Her pain has increased so she wanted to be seen again today.

## 2021-02-28 LAB
ATRIAL RATE: 65 BPM
CALCULATED P AXIS, ECG09: 50 DEGREES
CALCULATED R AXIS, ECG10: -6 DEGREES
CALCULATED T AXIS, ECG11: 40 DEGREES
DIAGNOSIS, 93000: NORMAL
P-R INTERVAL, ECG05: 168 MS
Q-T INTERVAL, ECG07: 444 MS
QRS DURATION, ECG06: 122 MS
QTC CALCULATION (BEZET), ECG08: 461 MS
VENTRICULAR RATE, ECG03: 65 BPM

## 2021-02-28 NOTE — ED NOTES
MD reviewed discharge instructions and options with patient and patient verbalized understanding. RN reviewed discharge instructions using teachback method. Pt ambulated to exit without difficulty and in no signs of acute distress escorted by her daughter, and her daughter  will drive home. No complaints or needs expressed at this time. Patient was counseled on medications prescribed at discharge. VSS, verbalized relief from most intense pain. Patient to call her PCP in the morning for appointment.

## 2021-03-01 NOTE — ED PROVIDER NOTES
EMERGENCY DEPARTMENT HISTORY AND PHYSICAL EXAM      Date: 2/25/2021  Patient Name: Carlene Nuno    History of Presenting Illness     Chief Complaint   Patient presents with    Chest Pain     mid sternal cp x 6 days. pt was seen here last week for similar symptoms        History Provided By: Patient    HPI: Carlene Nuno, 80 y.o. female presents to the ED with cc of chest pain. Pt has been having pain discomfort for 6 days. She was seen earlier this week and given Rx for Naprosyn. Pt states there has been no improvement Pain in the center of her chest and epigastric region. The pain is burning in nature from the abdomen into the chest. The pain does not radiate in jaw, arms or back. Pain is worsened by certain movement as well as eating. There has been no fever. She denies any cough or shortness of breath. There has been no nausea, vomiting or diarrhea. No urinary problems. There are no other complaints, changes, or physical findings at this time. PCP: Nery Weinberg MD    No current facility-administered medications on file prior to encounter. Current Outpatient Medications on File Prior to Encounter   Medication Sig Dispense Refill    naproxen (NAPROSYN) 500 mg tablet Take 1 Tab by mouth two (2) times daily (with meals). 20 Tab 0    FreeStyle Michael 14 Day Sensor kit USE QD UTD      naproxen (NAPROSYN) 500 mg tablet Take 1 Tab by mouth every twelve (12) hours as needed for Pain. 20 Tab 0    acetaminophen (Tylenol Arthritis Pain) 650 mg TbER Take 1 Tab by mouth every eight (8) hours. 20 Tab 0    cyclobenzaprine (FLEXERIL) 5 mg tablet Take 1 Tab by mouth nightly as needed for Muscle Spasm(s) for up to 10 doses.  10 Tab 0    FreeStyle Michael 14 Day [place] misc USE AS DIRECTED QD      metFORMIN ER (GLUCOPHAGE XR) 500 mg tablet       potassium chloride SR (KLOR-CON 10) 10 mEq tablet TK 1 T PO QD      tiZANidine (ZANAFLEX) 2 mg tablet TK 1 T PO QHS PRN      gabapentin (NEURONTIN) 300 mg capsule Take 1 Cap by mouth four (4) times daily. Max Daily Amount: 1,200 mg. Indications: neuropathic pain 360 Cap 3    alendronate (FOSAMAX) 70 mg tablet Take 70 mg by mouth every seven (7) days.  b complex vitamins tablet Take 1 Tab by mouth daily.  cholecalciferol, VITAMIN D3, (VITAMIN D3) 5,000 unit tab tablet Take 5,000 Units by mouth daily.  cetirizine (ZYRTEC) 10 mg tablet Take  by mouth as needed for Allergies.  hydrochlorothiazide (HYDRODIURIL) 25 mg tablet Take 1 Tab by mouth daily. Restart when Blood Pressure is greater than 120/80      citalopram (CELEXA) 20 mg tablet Take 20 mg by mouth daily.  atorvastatin (LIPITOR) 20 mg tablet Take 20 mg by mouth daily. Past History     Past Medical History:  Past Medical History:   Diagnosis Date    Arthritis     Chronic pain     knee    Depression     Diabetes ([address]     GERD (gastroesophageal reflux disease)     Hypertension     Ill-defined condition     seaonal allergies    Osteoporosis     Psychiatric disorder     anxiety       Past Surgical History:  Past Surgical History:   Procedure Laterality Date    HX CHOLECYSTECTOMY      HX ÓSCAR AND BSO      FL COLONOSCOPY FLX DX W/COLLJ SPEC WHEN PFRMD  11/21/2013         FL TOTAL KNEE ARTHROPLASTY      right    UPPER GI ENDOSCOPY,BIOPSY  11/3/2015            Family History:  Family History   Problem Relation Age of Onset    No Known Problems Mother     No Known Problems Father     Heart Disease Child     Arthritis-osteo Child     Hypertension Child        Social History:  Social History     Tobacco Use    Smoking status: Never Smoker    Smokeless tobacco: Never Used   Substance Use Topics    Alcohol use: Yes     Comment: social    Drug use: No       Allergies: Allergies   Allergen Reactions    Codeine Other (comments)     fainting    Bee Pollen Other (comments)         Review of Systems   Review of Systems   Constitutional: Negative.   Negative for appetite change, chills, fatigue and fever. HENT: Negative. Negative for congestion, rhinorrhea, sinus pressure and sore throat. Eyes: Negative. Respiratory: Negative. Negative for cough, choking, chest tightness, shortness of breath and wheezing. Cardiovascular: Negative. Negative for chest pain, palpitations and leg swelling. Gastrointestinal: Positive for abdominal pain. Negative for constipation, diarrhea, nausea and vomiting. Endocrine: Negative. Genitourinary: Negative. Negative for difficulty urinating, dysuria, flank pain and urgency. Musculoskeletal: Negative. Skin: Negative. Neurological: Negative. Negative for dizziness, speech difficulty, weakness, light-headedness, numbness and headaches. Psychiatric/Behavioral: Negative. All other systems reviewed and are negative. Physical Exam   Physical Exam  Vitals signs and nursing note reviewed. Constitutional:       General: She is not in acute distress. Appearance: She is well-developed. She is not diaphoretic. HENT:      Head: Normocephalic and atraumatic. Mouth/Throat:      Pharynx: No oropharyngeal exudate. Eyes:      Extraocular Movements: Extraocular movements intact. Conjunctiva/sclera: Conjunctivae normal.      Pupils: Pupils are equal, round, and reactive to light. Neck:      Musculoskeletal: Normal range of motion and neck supple. Vascular: No JVD. Trachea: No tracheal deviation. Cardiovascular:      Rate and Rhythm: Normal rate and regular rhythm. Heart sounds: Normal heart sounds. No murmur. Pulmonary:      Effort: Pulmonary effort is normal. No respiratory distress. Breath sounds: Normal breath sounds. No stridor. No wheezing or rales. Abdominal:      General: There is no distension. Palpations: Abdomen is soft. Tenderness: There is abdominal tenderness (epigastric ). There is no guarding or rebound. Musculoskeletal: Normal range of motion.          General: No tenderness. Right lower leg: She exhibits no tenderness. No edema. Left lower leg: She exhibits no tenderness. No edema. Skin:     General: Skin is warm and dry. Capillary Refill: Capillary refill takes less than 2 seconds. Neurological:      Mental Status: She is alert and oriented to person, place, and time. Cranial Nerves: No cranial nerve deficit. Comments: No gross motor or sensory deficits    Psychiatric:         Mood and Affect: Mood normal.         Behavior: Behavior normal.         Diagnostic Study Results     Labs -  CBC WITH AUTOMATED DIFF (Final result)   Component (Lab Inquiry)  Collection Time Result Time WBC RBC HGB HCT MCV   02/25/21 12:12:00 02/25/21 12:21:05 4.9 5.07 11.9 38.4 75.7Low        Collection Time Result Time MCH MCHC RDW PLT MPLV   02/25/21 12:12:00 02/25/21 12:21:05 23.5Low  31.0 14.5 277 9.9       Collection Time Result Time NRBC ANRBC GRANS LYMPH MONOS   02/25/21 12:12:00 02/25/21 12:21:05 0.0 0.00 58 30 11       Collection Time Result Time EOS BASOS IG ABG ABL   02/25/21 12:12:00 02/25/21 12:21:05 1 0 0 2.8 1.5       Collection Time Result Time ABM SAIRA ABB AIG DF   02/25/21 12:12:00 02/25/21 12:21:05 0.5 0.1 0.0 0.0 AUTOMATED         Final result                          Contains abnormal data METABOLIC PANEL, COMPREHENSIVE (Final result)   Component (Lab Inquiry)  Collection Time Result Time NA K CL CO2 AGAP   02/25/21 12:12:00 02/25/21 12:51:23 138 4.1 105 27 6       Collection Time Result Time GLU BUN CREA BUCR GFRAA   02/25/21 12:12:00 02/25/21 12:51:23 121High  19 0.84 23High  >60       Collection Time Result Time GFRNA CA TBIL GPT SGOT   02/25/21 12:12:00 02/25/21 12:51:23 >60 9.7 0.3 30 24       Collection Time Result Time AP TP ALB GLOB AGRAT   02/25/21 12:12:00 02/25/21 12:51:23 74 8. 4High  3.7 4.7High  0.8Low        Final result                        TROPONIN I (Final result)   Component (Lab Inquiry)  Collection Time Result Time CHRISIQ 02/25/21 12:12:00 02/25/21 12:51:23 <0.05   The presence of detect. ..         Final result                          LIPASE (Final result)   Component (Lab Inquiry)  Collection Time Result Time LPSE   02/25/21 12:12:00 02/25/21 14:07:21 163         Final result             Radiologic Studies -   XR ABD ACUTE W 1 V CHEST   Final Result   No acute thoracic or abdominal findings. XR CHEST PORT   Final Result   No acute findings. CT Results  (Last 48 hours)    None        CXR Results  (Last 48 hours)    None          Medical Decision Making   I am the first provider for this patient. I reviewed the vital signs, available nursing notes, past medical history, past surgical history, family history and social history. Vital Signs-Reviewed the patient's vital signs. EKG interpretation: (Preliminary)  Sinus, rate 74, RBBB, normal axis/pr, prolonged, qrs, no acute ST changes, Stephani Jerez DO      Records Reviewed: Nursing Notes, Old Medical Records, Previous electrocardiograms, Previous Radiology Studies and Previous Laboratory Studies, pt seen on 2/22/2021 for epigastric pain. Provider Notes (Medical Decision Making):   DDx- Gatritis dyspepsia, chest wall pain, ACS, pancreatitis     ED Course:   Initial assessment performed. The patients presenting problems have been discussed, and they are in agreement with the care plan formulated and outlined with them. I have encouraged them to ask questions as they arise throughout their visit. Pt noted significant improvement with GI cocktail. CE's negative, Stephani Jerez DO  Pt still also having some CP with movements, this may also be a muscular component, RX Robaxin for as needed basis. Discussed stopping Naprosyn as this may be causing some of her symptoms worsening,. Disposition:  DC home Rx pepcid to  Be taken daily for 1 month. DISCHARGE PLAN:  1.    Discharge Medication List as of 2/25/2021  5:18 PM      START taking these medications    Details   methocarbamoL (Robaxin-750) 750 mg tablet Take 1 Tab by mouth four (4) times daily as needed for Pain., Normal, Disp-20 Tab, R-0      lidocaine (XYLOCAINE) 2 % solution Take 10 mL by mouth as needed for Pain., Normal, Disp-1 Bottle, R-0      famotidine (Pepcid) 20 mg tablet Take 1 Tab by mouth two (2) times a day for 30 days. , Normal, Disp-60 Tab, R-0         CONTINUE these medications which have NOT CHANGED    Details   !! naproxen (NAPROSYN) 500 mg tablet Take 1 Tab by mouth two (2) times daily (with meals). , Normal, Disp-20 Tab, R-0      FreeStyle Michael 14 Day Sensor kit USE QD UTD, Historical Med, [place]      !! naproxen (NAPROSYN) 500 mg tablet Take 1 Tab by mouth every twelve (12) hours as needed for Pain., Normal, Disp-20 Tab,R-0      acetaminophen (Tylenol Arthritis Pain) 650 mg TbER Take 1 Tab by mouth every eight (8) hours. , Normal, Disp-20 Tab,R-0      cyclobenzaprine (FLEXERIL) 5 mg tablet Take 1 Tab by mouth nightly as needed for Muscle Spasm(s) for up to 10 doses. , Normal, Disp-10 Tab,R-0      FreeStyle Michael 14 Day [place] misc USE AS DIRECTED QD, Historical Med, [place]      metFORMIN ER (GLUCOPHAGE XR) 500 mg tablet Historical Med      potassium chloride SR (KLOR-CON 10) 10 mEq tablet TK 1 T PO QD, Historical Med      tiZANidine (ZANAFLEX) 2 mg tablet TK 1 T PO QHS PRN, Historical Med      gabapentin (NEURONTIN) 300 mg capsule Take 1 Cap by mouth four (4) times daily. Max Daily Amount: 1,200 mg. Indications: neuropathic pain, Normal, Disp-360 Cap,R-3      alendronate (FOSAMAX) 70 mg tablet Take 70 mg by mouth every seven (7) days. , Historical Med      b complex vitamins tablet Take 1 Tab by mouth daily. , Historical Med      cholecalciferol, VITAMIN D3, (VITAMIN D3) 5,000 unit tab tablet Take 5,000 Units by mouth daily. , Historical Med      cetirizine (ZYRTEC) 10 mg tablet Take  by mouth as needed for Allergies. , Historical Med      hydrochlorothiazide (HYDRODIURIL) 25 mg tablet Take 1 Tab by mouth daily. Restart when Blood Pressure is greater than 120/80, Historical Med      citalopram (CELEXA) 20 mg tablet Take 20 mg by mouth daily. , Historical Med      atorvastatin (LIPITOR) 20 mg tablet Take 20 mg by mouth daily. , Historical Med      omeprazole (PRILOSEC) 20 mg capsule Take 20 mg by mouth daily. , Historical Med       !! - Potential duplicate medications found. Please discuss with provider. 2.   Follow-up Information     Follow up With Specialties Details Why Contact Info    [address], [place], [address] [facility] [address] [facility]      Natividad Zafar MD Gastroenterology   [address] [facility] [address]  [address] [facility]  [address] [id number] [phone]          3. Return to ED if worse     Diagnosis     Clinical Impression:   1. Abdominal pain, epigastric    2. Chest pain, unspecified type        Attestations:    Bereket Ramirez, DO    Please note that this dictation was completed with [employer], the computer voice recognition software. Quite often unanticipated grammatical, syntax, homophones, and other interpretive errors are inadvertently transcribed by the computer software. Please disregard these errors. Please excuse any errors that have escaped final proofreading. Thank you.

## 2021-03-19 ENCOUNTER — OFFICE VISIT (OUTPATIENT)
Dept: HEMATOLOGY | Age: 86
End: 2021-03-19
Payer: MEDICARE

## 2021-03-19 VITALS
WEIGHT: 159.6 LBS | HEIGHT: 63 IN | RESPIRATION RATE: 22 BRPM | OXYGEN SATURATION: 96 % | HEART RATE: 102 BPM | BODY MASS INDEX: 28.28 KG/M2 | TEMPERATURE: 96.8 F | SYSTOLIC BLOOD PRESSURE: 142 MMHG | DIASTOLIC BLOOD PRESSURE: 82 MMHG

## 2021-03-19 DIAGNOSIS — K75.4 AUTOIMMUNE HEPATITIS (HCC): Primary | ICD-10-CM

## 2021-03-19 PROCEDURE — 99214 OFFICE O/P EST MOD 30 MIN: CPT | Performed by: HOSPITALIST

## 2021-03-19 PROCEDURE — G0463 HOSPITAL OUTPT CLINIC VISIT: HCPCS | Performed by: HOSPITALIST

## 2021-03-19 PROCEDURE — G8427 DOCREV CUR MEDS BY ELIG CLIN: HCPCS | Performed by: HOSPITALIST

## 2021-03-19 PROCEDURE — G8432 DEP SCR NOT DOC, RNG: HCPCS | Performed by: HOSPITALIST

## 2021-03-19 PROCEDURE — 1090F PRES/ABSN URINE INCON ASSESS: CPT | Performed by: HOSPITALIST

## 2021-03-19 PROCEDURE — 3288F FALL RISK ASSESSMENT DOCD: CPT | Performed by: HOSPITALIST

## 2021-03-19 PROCEDURE — 91200 LIVER ELASTOGRAPHY: CPT | Performed by: HOSPITALIST

## 2021-03-19 PROCEDURE — G8536 NO DOC ELDER MAL SCRN: HCPCS | Performed by: HOSPITALIST

## 2021-03-19 PROCEDURE — G8417 CALC BMI ABV UP PARAM F/U: HCPCS | Performed by: HOSPITALIST

## 2021-03-19 PROCEDURE — 1100F PTFALLS ASSESS-DOCD GE2>/YR: CPT | Performed by: HOSPITALIST

## 2021-03-19 NOTE — LETTER
3/30/2021 Patient: Adan Gerardo YOB: 1932 Date of Visit: 3/19/2021 Jeronimo Ornelas MD 
[address] [address] [id number] [address] [employer] Dear Jeronimo Ornelas MD, Thank you for referring Ms. Adan Gerardo to [address] for evaluation. My notes for this consultation are attached. If you have questions, please do not hesitate to call me. I look forward to following your patient along with you. Sincerely, Lisa Anand MD

## 2021-03-19 NOTE — PROGRESS NOTES
Identified pt with two pt identifiers(name and ). Reviewed record in preparation for visit and have obtained necessary documentation. Chief Complaint   Patient presents with    Follow-up      Vitals:    21 1107   BP: (!) 142/82   Pulse: (!) 102   Resp: 22   Temp: 96.8 °F (36 °C)   TempSrc: Temporal   SpO2: 96%   Weight: 159 lb 9.6 oz (72.4 kg)   Height: 5' 3\" (1.6 m)   PainSc:   0 - No pain       Health Maintenance Review: Patient reminded of \"due or due soon\" health maintenance. I have asked the patient to contact his/her primary care provider (PCP) for follow-up on his/her health maintenance. Coordination of Care Questionnaire:  :   1) Have you been to an emergency room, urgent care, or hospitalized since your last visit? If yes, where when, and reason for visit? yes     [id number] [facility]    2. Have seen or consulted any other health care provider since your last visit? If yes, where when, and reason for visit? NO      Patient is accompanied by son I have received verbal consent from Sloan Solorio to discuss any/all medical information while they are present in the room.

## 2021-03-19 NOTE — PROGRESS NOTES
[address] [facility], Kiera FINLEY, Leon Shone, MD, MPH      Darrin Esquivel, CARMELLA Lisa, ACNP-BC     Meredith Garrett, AGPCNP-BC   Ivania Burgos FNP-JANIE Acevedo PCNP-BC       James Reyes [facility] 136    at [address], [address] [place] [address], [place] [address].    [phone]    FAX: [address] [place] [address]    at [address], [address] [facility] [address]    [phone]    FAX: [phone]     Patient Care Team:  Kelby Prajapati MD as PCP - General (Family Medicine)    Problem List  Date Reviewed: 12/18/2020          Codes Class Noted    Status post total left knee replacement ICD-10-CM: P02.878  ICD-9-CM: V43.65  8/21/2018        Idiopathic small and large fiber sensory neuropathy ICD-10-CM: G60.8  ICD-9-CM: 356.4  5/15/2017        Diabetic peripheral neuropathy associated with type 2 diabetes mellitus ([facility] ICD-10-CM: E11.42  ICD-9-CM: 250.60, 357.2  5/15/2017        B12 deficiency ICD-10-CM: E53.8  ICD-9-CM: 266.2  5/15/2017        Vitamin D deficiency ICD-10-CM: E55.9  ICD-9-CM: 268.9  5/15/2017        Bilateral carpal tunnel syndrome ICD-10-CM: G56.03  ICD-9-CM: 354.0  5/15/2017        Cervical radiculopathy due to osteoarthritis of spine ICD-10-CM: M47.22  ICD-9-CM: 721.0  5/15/2017        Lumbar back pain with radiculopathy affecting left lower extremity ICD-10-CM: M54.16  ICD-9-CM: 724.4  5/15/2017        Lumbar back pain with radiculopathy affecting right lower extremity ICD-10-CM: M54.16  ICD-9-CM: 724.4  5/15/2017        Low back pain ICD-10-CM: M54.5  ICD-9-CM: 724.2  5/15/2017        Primary osteoarthritis of right knee ICD-10-CM: M17.11  ICD-9-CM: 715.16  8/17/2016        Primary localized osteoarthritis of right knee ICD-10-CM: M17.11  ICD-9-CM: 715.16  8/17/2016             Carlene Postal returns to the [facility] for follow up and management of elevated liver enzymes secondary to presumed autoimmune hepatitis  All medical records sent by the referring physicians were reviewed including imaging studies. The patient is a 80 y.o. Black female who was found to have elevated  liver enzymes in 09/2020 during routine phsyicacl examination with primary care physician. Liver enzymes have remained persistently elevated. Liver function test performed in 09/2020 showed AST 62, ALT 84, ALKP 73, Total bili 0.4. Serologic makers for causes of chronic liver disease is positive for hepatitis A Ab, hepatitis B core Ab, ARLETTE and ASMA. The patient had not started any new medications within 3 months preceding the elevation in liver chemistries. She is on tylenol and vitamin centrum OTC. Patient does not have a history of liver disease. Imaging of the liver performed on10/2020 reviewed mild hepatic steatosis. FibroScan performed at [address]. EkPa was 7.1. IQR/med 13%. . The results suggested a fibrosis level of F1. The CAP score suggests there is no significant hepatic steatosis     The patient has no symptoms which can be attributed to the liver disorder.,     The patient denies fatigue, pain in the right side over the liver, diffuse abdominal pain, arthralgias, myalgias, yellowing of the eyes or skin, itching,     The patient completes all daily activities without any functional limitations. ASSESSMENT AND PLAN:    Presumed Autoimmune hepatitis    The ARLETTE is positive, ASMA is positive. Suspect patient has autoimmune  Liver disease  . The only way we can confirm autoimmune hepatitis is to perform a liver biopsy. Patient does not wish to proceed with a liver biopsy.   Fibro scan shows EkPa score of 7.1 which correlates with F 1 fibrosis    We will not start steroids or immunosuppression due to advanced age  We will monitor liver function test    Elevated liver enzymes  This is likely due to autoimmune hepatitis  Liver enzymes have been intermittently elevated. LFT's is now normal  We will continue to monitor    Screening for Hepatocellular Carcinoma  HCC screening is not necessary if the patient has no evidence of cirrhosis. Treatment of other medical problems in patients with chronic liver disease  There are no contraindications for the patient to take most medications that are necessary for treatment of other medical issues. Vaccinations   Vaccination for viral hepatitis A and B is not needed. The patient has serologic evidence of prior exposure or vaccination with immunity. Routine vaccinations against other bacterial and viral agents can be performed as indicated. Annual flu vaccination should be administered if indicated. ALLERGIES  Allergies   Allergen Reactions    Codeine Other (comments)     fainting    Bee Pollen Other (comments)       MEDICATIONS  Current Outpatient Medications   Medication Sig    lidocaine (XYLOCAINE) 2 % solution Take 15 mL by mouth as needed for Pain.  methocarbamoL (Robaxin-750) 750 mg tablet Take 1 Tab by mouth four (4) times daily as needed for Pain.  lidocaine (XYLOCAINE) 2 % solution Take 10 mL by mouth as needed for Pain.  naproxen (NAPROSYN) 500 mg tablet Take 1 Tab by mouth two (2) times daily (with meals).  FreeStyle Michael 14 Day Sensor kit USE QD UTD    naproxen (NAPROSYN) 500 mg tablet Take 1 Tab by mouth every twelve (12) hours as needed for Pain.  acetaminophen (Tylenol Arthritis Pain) 650 mg TbER Take 1 Tab by mouth every eight (8) hours.  cyclobenzaprine (FLEXERIL) 5 mg tablet Take 1 Tab by mouth nightly as needed for Muscle Spasm(s) for up to 10 doses.     FreeStyle Michael 14 Day [place] misc USE AS DIRECTED QD    metFORMIN ER (GLUCOPHAGE XR) 500 mg tablet     potassium chloride SR (KLOR-CON 10) 10 mEq tablet TK 1 T PO QD    tiZANidine (ZANAFLEX) 2 mg tablet TK 1 T PO QHS PRN    gabapentin (NEURONTIN) 300 mg capsule Take 1 Cap by mouth four (4) times daily. Max Daily Amount: 1,200 mg. Indications: neuropathic pain    alendronate (FOSAMAX) 70 mg tablet Take 70 mg by mouth every seven (7) days.  b complex vitamins tablet Take 1 Tab by mouth daily.  cholecalciferol, VITAMIN D3, (VITAMIN D3) 5,000 unit tab tablet Take 5,000 Units by mouth daily.  cetirizine (ZYRTEC) 10 mg tablet Take  by mouth as needed for Allergies.  hydrochlorothiazide (HYDRODIURIL) 25 mg tablet Take 1 Tab by mouth daily. Restart when Blood Pressure is greater than 120/80    citalopram (CELEXA) 20 mg tablet Take 20 mg by mouth daily.  atorvastatin (LIPITOR) 20 mg tablet Take 20 mg by mouth daily. No current facility-administered medications for this visit. SYSTEM REVIEW NOT RELATED TO LIVER DISEASE OR REVIEWED ABOVE:  Constitution systems: Negative for fever, chills, weight gain, weight loss. Eyes: Negative for visual changes. ENT: Negative for sore throat, painful swallowing. Respiratory: Negative for cough, hemoptysis, SOB. Cardiology: Negative for chest pain, palpitations. GI:  Negative for constipation or diarrhea. : Negative for urinary frequency, dysuria, hematuria, nocturia. Skin: Negative for rash. Hematology: Negative for easy bruising, blood clots. Musculo-skelatal: Negative for back pain, muscle pain, weakness. Neurologic: Negative for headaches, dizziness, vertigo, memory problems not related to HE. Psychology: Negative for anxiety, depression. FAMILY HISTORY:  Both parents are . There is no family history of liver disease    SOCIAL HISTORY:  The patient   Patient has 7 children. 2 are . She has 11 grand children and 8 great grand children   The patient has never used tobacco products. The patient consumes alcohol on social occasions never in excess. The patient is retired. PHYSICAL EXAMINATION:  Visit Vitals  BP (!) 142/82 (BP 1 Location: Right upper arm, BP Patient Position: Sitting, BP Cuff Size: Adult)   Pulse (!) 102   Temp 96.8 °F (36 °C) (Temporal)   Resp 22   Ht 5' 3\" (1.6 m)   Wt 159 lb 9.6 oz (72.4 kg)   LMP  (LMP Unknown)   SpO2 96%   BMI 28.27 kg/m²     General: No acute distress. Eyes: Sclera anicteric. ENT: No oral lesions. Thyroid normal.  Nodes: No adenopathy. Skin: No spider angiomata. No jaundice. No palmar erythema. Respiratory: Lungs clear to auscultation. Cardiovascular: Regular heart rate. No murmurs. No JVD. Abdomen: Soft non-tender. Liver size normal to percussion/palpation. Spleen not palpable. No obvious ascites. Extremities: No edema. No muscle wasting. No gross arthritic changes. Neurologic: Alert and oriented. Cranial nerves grossly intact. No asterixis. LABORATORY STUDIES:  Recent liver function panel, CBC with platelet count and BMP are not available. These studies will be performed.      Liver [place] [address] Ref Rng & Units 2/27/2021 2/25/2021   WBC 3.6 - 11.0 K/uL 5.1 4.9   ANC 1.8 - 8.0 K/UL 2.7 2.8   HGB 11.5 - 16.0 g/dL 12.0 11.9    - 400 K/uL 261 277   INR 0.9 - 1.1       AST 15 - 37 U/L 52 (H) 24   ALT 12 - 78 U/L 28 30   Alk Phos 45 - 117 U/L 74 74   Bili, Total 0.2 - 1.0 MG/DL 0.4 0.3   Albumin 3.5 - 5.0 g/dL 3.8 3.7   BUN 6 - 20 MG/DL 13 19   Creat 0.55 - 1.02 MG/DL 0.70 0.84   Na 136 - 145 mmol/L 138 138   K 3.5 - 5.1 mmol/L 4.9 4.1   Cl 97 - 108 mmol/L 105 105   CO2 21 - 32 mmol/L 26 27   Glucose 65 - 100 mg/dL 100 121 (H)     Liver [place] [facility] Latest Ref Rng & Units 2/22/2021   WBC 3.6 - 11.0 K/uL 5.3   ANC 1.8 - 8.0 K/UL    HGB 11.5 - 16.0 g/dL 12.4    - 400 K/uL 314   INR 0.9 - 1.1      AST 15 - 37 U/L 28   ALT 12 - 78 U/L 32   Alk Phos 45 - 117 U/L 90   Bili, Total 0.2 - 1.0 MG/DL 0.3   Albumin 3.5 - 5.0 g/dL 3.5   BUN 6 - 20 MG/DL 13   Creat 0.55 - 1.02 MG/DL 0.77   Na 136 - 145 mmol/L 137   K 3.5 - 5.1 mmol/L 4.0   Cl 97 - 108 mmol/L 104   CO2 21 - 32 mmol/L 27   Glucose 65 - 100 mg/dL 104 (H)     SEROLOGIES:  Not available or performed. Testing was performed today. Serologies Latest Ref Rng & Units 10/14/2020   Hep A Ab, Total Negative Positive (A)   Hep B Surface Ag Negative Negative   Hep B Core Ab, Total Negative Positive (A)   Hep B Surface AB QL  Reactive   Hep C Ab 0.0 - 0.9 s/co ratio 0.2   Iron % Saturation 15 - 55 % 24   ARLETTE, IFA  Positive (A)   ARLETTE Pattern  1:1280 (H)   ASMCA 0 - 19 Units 35 (H)   Alpha-1 antitrypsin level 101 - 187 mg/dL 168     LIVER HISTOLOGY:  03/19/2021: [facility]. EkPa was 7.1. IQR/med 13%. . The results suggested a fibrosis level of F1. The CAP score suggests there is no significant hepatic steatosis    ENDOSCOPIC PROCEDURES:  Not available or performed    RADIOLOGY:  10/2020; liver ultrasound; mild hepatic steatosis. OTHER TESTING:  Not available or performed    FOLLOW-UP:  All of the issues listed above in the Assessment and Plan were discussed with the patient. All questions were answered. The patient expressed a clear understanding of the above.     [address] [place] [address] in 6 months for routine monitoring    Lisa Anand MD, MPH  Advanced Hepatology  [facility] [address] [facility] [address], [address] [facility] 22.  [phone]  [address] [place] [address]

## 2021-03-30 ENCOUNTER — HOSPITAL ENCOUNTER (OUTPATIENT)
Dept: BONE DENSITY | Age: 86
Discharge: HOME | End: 2021-03-30
Attending: FAMILY MEDICINE
Payer: MEDICARE

## 2021-03-30 DIAGNOSIS — Z78.0 POST-MENOPAUSAL: ICD-10-CM

## 2021-03-30 PROCEDURE — 77080 DXA BONE DENSITY AXIAL: CPT

## 2021-04-05 ENCOUNTER — TRANSCRIBE ORDER (OUTPATIENT)
Dept: SCHEDULING | Age: 86
End: 2021-04-05

## 2021-04-05 DIAGNOSIS — Z12.31 VISIT FOR SCREENING MAMMOGRAM: Primary | ICD-10-CM

## 2021-04-23 ENCOUNTER — TRANSCRIBE ORDER (OUTPATIENT)
Dept: SCHEDULING | Age: 86
End: 2021-04-23

## 2021-04-23 DIAGNOSIS — S32.020S COMPRESSION FRACTURE OF L2 VERTEBRA, SEQUELA: ICD-10-CM

## 2021-04-23 DIAGNOSIS — S32.030S COMPRESSION FRACTURE OF L3 VERTEBRA, SEQUELA: ICD-10-CM

## 2021-04-23 DIAGNOSIS — M47.817 LUMBOSACRAL SPONDYLOSIS WITHOUT MYELOPATHY: ICD-10-CM

## 2021-04-23 DIAGNOSIS — M54.50 LOW BACK PAIN, UNSPECIFIED BACK PAIN LATERALITY, UNSPECIFIED CHRONICITY, UNSPECIFIED WHETHER SCIATICA PRESENT: Primary | ICD-10-CM

## 2021-04-23 DIAGNOSIS — M43.10 ACQUIRED SPONDYLOLISTHESIS: ICD-10-CM

## 2021-05-09 ENCOUNTER — HOSPITAL ENCOUNTER (OUTPATIENT)
Dept: PREADMISSION TESTING | Age: 86
Discharge: HOME | End: 2021-05-09
Payer: MEDICARE

## 2021-05-09 LAB — SARS-COV-2, COV2: NORMAL

## 2021-05-09 PROCEDURE — U0003 INFECTIOUS AGENT DETECTION BY NUCLEIC ACID (DNA OR RNA); SEVERE ACUTE RESPIRATORY SYNDROME CORONAVIRUS 2 (SARS-COV-2) (CORONAVIRUS DISEASE [COVID-19]), AMPLIFIED PROBE TECHNIQUE, MAKING USE OF HIGH THROUGHPUT TECHNOLOGIES AS DESCRIBED BY CMS-2020-01-R: HCPCS

## 2021-05-10 LAB — SARS-COV-2, COV2NT: NOT DETECTED

## 2021-05-13 ENCOUNTER — ANESTHESIA (OUTPATIENT)
Dept: ENDOSCOPY | Age: 86
End: 2021-05-13
Payer: MEDICARE

## 2021-05-13 ENCOUNTER — HOSPITAL ENCOUNTER (OUTPATIENT)
Age: 86
Setting detail: OUTPATIENT SURGERY
Discharge: HOME | End: 2021-05-13
Attending: INTERNAL MEDICINE | Admitting: INTERNAL MEDICINE
Payer: MEDICARE

## 2021-05-13 ENCOUNTER — ANESTHESIA EVENT (OUTPATIENT)
Dept: ENDOSCOPY | Age: 86
End: 2021-05-13
Payer: MEDICARE

## 2021-05-13 VITALS
RESPIRATION RATE: 13 BRPM | BODY MASS INDEX: 28.17 KG/M2 | HEART RATE: 69 BPM | HEIGHT: 63 IN | TEMPERATURE: 98.2 F | OXYGEN SATURATION: 100 % | SYSTOLIC BLOOD PRESSURE: 166 MMHG | DIASTOLIC BLOOD PRESSURE: 61 MMHG | WEIGHT: 159 LBS

## 2021-05-13 PROCEDURE — 74011250636 HC RX REV CODE- 250/636: Performed by: NURSE ANESTHETIST, CERTIFIED REGISTERED

## 2021-05-13 PROCEDURE — 76040000019: Performed by: INTERNAL MEDICINE

## 2021-05-13 PROCEDURE — 2709999900 HC NON-CHARGEABLE SUPPLY: Performed by: INTERNAL MEDICINE

## 2021-05-13 PROCEDURE — 74011250636 HC RX REV CODE- 250/636: Performed by: INTERNAL MEDICINE

## 2021-05-13 PROCEDURE — 76060000031 HC ANESTHESIA FIRST 0.5 HR: Performed by: INTERNAL MEDICINE

## 2021-05-13 PROCEDURE — 88305 TISSUE EXAM BY PATHOLOGIST: CPT

## 2021-05-13 PROCEDURE — 77030019988 HC FCPS ENDOSC DISP BSC -B: Performed by: INTERNAL MEDICINE

## 2021-05-13 PROCEDURE — 74011000250 HC RX REV CODE- 250: Performed by: NURSE ANESTHETIST, CERTIFIED REGISTERED

## 2021-05-13 RX ORDER — SODIUM CHLORIDE 9 MG/ML
75 INJECTION, SOLUTION INTRAVENOUS CONTINUOUS
Status: DISCONTINUED | OUTPATIENT
Start: 2021-05-13 | End: 2021-05-13 | Stop reason: HOSPADM

## 2021-05-13 RX ORDER — LIDOCAINE HYDROCHLORIDE 20 MG/ML
INJECTION, SOLUTION EPIDURAL; INFILTRATION; INTRACAUDAL; PERINEURAL AS NEEDED
Status: DISCONTINUED | OUTPATIENT
Start: 2021-05-13 | End: 2021-05-13 | Stop reason: HOSPADM

## 2021-05-13 RX ORDER — FENTANYL CITRATE 50 UG/ML
25 INJECTION, SOLUTION INTRAMUSCULAR; INTRAVENOUS
Status: DISCONTINUED | OUTPATIENT
Start: 2021-05-13 | End: 2021-05-13 | Stop reason: HOSPADM

## 2021-05-13 RX ORDER — SODIUM CHLORIDE 0.9 % (FLUSH) 0.9 %
5-40 SYRINGE (ML) INJECTION EVERY 8 HOURS
Status: DISCONTINUED | OUTPATIENT
Start: 2021-05-13 | End: 2021-05-13 | Stop reason: HOSPADM

## 2021-05-13 RX ORDER — ATROPINE SULFATE 0.1 MG/ML
0.5 INJECTION INTRAVENOUS
Status: DISCONTINUED | OUTPATIENT
Start: 2021-05-13 | End: 2021-05-13 | Stop reason: HOSPADM

## 2021-05-13 RX ORDER — EPINEPHRINE 0.1 MG/ML
1 INJECTION INTRACARDIAC; INTRAVENOUS
Status: DISCONTINUED | OUTPATIENT
Start: 2021-05-13 | End: 2021-05-13 | Stop reason: HOSPADM

## 2021-05-13 RX ORDER — NALOXONE HYDROCHLORIDE 0.4 MG/ML
0.4 INJECTION, SOLUTION INTRAMUSCULAR; INTRAVENOUS; SUBCUTANEOUS
Status: DISCONTINUED | OUTPATIENT
Start: 2021-05-13 | End: 2021-05-13 | Stop reason: HOSPADM

## 2021-05-13 RX ORDER — OMEPRAZOLE 20 MG/1
20 CAPSULE, DELAYED RELEASE ORAL DAILY
Qty: 30 CAP | Refills: 11 | Status: SHIPPED | OUTPATIENT
Start: 2021-05-13 | End: 2022-05-08

## 2021-05-13 RX ORDER — SODIUM CHLORIDE 0.9 % (FLUSH) 0.9 %
5-40 SYRINGE (ML) INJECTION AS NEEDED
Status: DISCONTINUED | OUTPATIENT
Start: 2021-05-13 | End: 2021-05-13 | Stop reason: HOSPADM

## 2021-05-13 RX ORDER — MIDAZOLAM HYDROCHLORIDE 1 MG/ML
.25-5 INJECTION, SOLUTION INTRAMUSCULAR; INTRAVENOUS
Status: DISCONTINUED | OUTPATIENT
Start: 2021-05-13 | End: 2021-05-13 | Stop reason: HOSPADM

## 2021-05-13 RX ORDER — PROPOFOL 10 MG/ML
INJECTION, EMULSION INTRAVENOUS AS NEEDED
Status: DISCONTINUED | OUTPATIENT
Start: 2021-05-13 | End: 2021-05-13 | Stop reason: HOSPADM

## 2021-05-13 RX ORDER — DEXTROMETHORPHAN/PSEUDOEPHED 2.5-7.5/.8
1.2 DROPS ORAL
Status: DISCONTINUED | OUTPATIENT
Start: 2021-05-13 | End: 2021-05-13 | Stop reason: HOSPADM

## 2021-05-13 RX ORDER — FLUMAZENIL 0.1 MG/ML
0.2 INJECTION INTRAVENOUS
Status: DISCONTINUED | OUTPATIENT
Start: 2021-05-13 | End: 2021-05-13 | Stop reason: HOSPADM

## 2021-05-13 RX ADMIN — SODIUM CHLORIDE: 900 INJECTION, SOLUTION INTRAVENOUS at 09:41

## 2021-05-13 RX ADMIN — LIDOCAINE HYDROCHLORIDE 80 MG: 20 INJECTION, SOLUTION EPIDURAL; INFILTRATION; INTRACAUDAL; PERINEURAL at 09:45

## 2021-05-13 RX ADMIN — PROPOFOL 50 MG: 10 INJECTION, EMULSION INTRAVENOUS at 09:46

## 2021-05-13 RX ADMIN — PROPOFOL 20 MG: 10 INJECTION, EMULSION INTRAVENOUS at 09:48

## 2021-05-13 RX ADMIN — PROPOFOL 50 MG: 10 INJECTION, EMULSION INTRAVENOUS at 09:45

## 2021-05-13 NOTE — PROCEDURES
NAME:  Carol Goodman   :   1932   MRN:   [id number]     Date/Time:  2021 10:01 AM    Esophagogastroduodenoscopy (EGD) Procedure Note    Procedure: Esophagogastroduodenoscopy with biopsy    Indication:  Abdominal pain, epigastric, GERD  Pre-operative Diagnosis: see indication above  Post-operative Diagnosis: see findings below  :  Seven Singh MD  Referring Provider:   Sandrnie Arcos MD    Exam:  Airway: clear, no airway problems anticipated  Heart: RRR, without gallops or rubs  Lungs: clear bilaterally without wheezes, crackles, or rhonchi  Abdomen: soft, nontender, nondistended, bowel sounds present  Mental Status: awake, alert and oriented to person, place and time     Anethesia/Sedation:  MAC anesthesia Propofol 120mg IV  Procedure Details   After informed consent was obtained for the procedure, with all risks and benefits of procedure explained the patient was taken to the endoscopy suite and placed in the left lateral decubitus position. Following sequential administration of sedation as per above, the [id number] gastroscope was inserted into the mouth and advanced under direct vision to second portion of the duodenum. A careful inspection was made as the gastroscope was withdrawn, including a retroflexed view of the proximal stomach; findings and interventions are described below. Findings:    -Normal appearing esophageal mucosa; biopsied to exclude esophagitis  -Small 2cm sliding hiatal hernia from 37-39cm  -Distal gastric erythema (redness) to suggest gastritis; biopsied  -Normal duodenum     Therapies:  biopsy of esophagus; biopsy of stomach   Specimens: #1 gastric; #2 distal esoph  EBL:  None. Complications:   None; patient tolerated the procedure well.            Impression:    -Normal appearing esophageal mucosa; biopsied to exclude esophagitis  -Small 2cm sliding hiatal hernia from 37-39cm  -Distal gastric erythema (redness) to suggest gastritis; biopsied  -Normal duodenum     Recommendations:  -Await pathology.  -Continue current medications  -Start daily omeprazole     Discharge disposition:  Home in the company of  when able to ambulate    Allie Abernathy MD

## 2021-05-13 NOTE — PROGRESS NOTES
Catalina Gonzalez  9/22/1932  [id number]    Situation:  Verbal report received from: Joan NUÑEZ  Procedure: Procedure(s):  ESOPHAGOGASTRODUODENOSCOPY (EGD)  ESOPHAGOGASTRODUODENAL (EGD) BIOPSY    Background:    Preoperative diagnosis: GERD  Postoperative diagnosis: GERD    :  Dr. Chrissy Jara  Assistant(s): Endoscopy Technician-1: Farhana Vargas  Endoscopy RN-1: Kalyani Hernandez RN    Specimens:   ID Type Source Tests Collected by Time Destination   1 : Gastric biopsy Preservative Gastric  Patsy Lee MD 5/13/2021 0354 Pathology   2 : GE Junction biopsy Preservative   Patsy Lee MD 5/13/2021 8240 Pathology     H. Pylori  no    Assessment:  Intra-procedure medications     Anesthesia gave intra-procedure sedation and medications, see anesthesia flow sheet yes    Intravenous fluids: NS@ KVO     Vital signs stable     Abdominal assessment: round and soft     Recommendation:  Discharge patient per MD order.   Family Son Darren Harding to share finding with family or friend yes

## 2021-05-13 NOTE — ANESTHESIA PREPROCEDURE EVALUATION
Anesthetic History   No history of anesthetic complications            Review of Systems / Medical History  Patient summary reviewed, nursing notes reviewed and pertinent labs reviewed    Pulmonary  Within defined limits                 Neuro/Psych         Psychiatric history     Cardiovascular    Hypertension              Exercise tolerance: >4 METS     GI/Hepatic/Renal     GERD           Endo/Other    Diabetes    Arthritis     Other Findings              Physical Exam    Airway  Mallampati: II    Neck ROM: normal range of motion   Mouth opening: Normal     Cardiovascular  Regular rate and rhythm,  S1 and S2 normal,  no murmur, click, rub, or gallop             Dental    Dentition: Caps/crowns and Poor dentition     Pulmonary  Breath sounds clear to auscultation               Abdominal  GI exam deferred       Other Findings            Anesthetic Plan    ASA: 2  Anesthesia type: MAC            Anesthetic plan and risks discussed with: Patient

## 2021-05-13 NOTE — ANESTHESIA POSTPROCEDURE EVALUATION
Procedure(s):  ESOPHAGOGASTRODUODENOSCOPY (EGD)  ESOPHAGOGASTRODUODENAL (EGD) BIOPSY. MAC    Anesthesia Post Evaluation        Patient location during evaluation: PACU  Note status: Adequate. Level of consciousness: responsive to verbal stimuli and sleepy but conscious  Pain management: satisfactory to patient  Airway patency: patent  Anesthetic complications: no  Cardiovascular status: acceptable  Respiratory status: acceptable  Hydration status: acceptable  Comments: +Post-Anesthesia Evaluation and Assessment    Patient: Rajat Owen MRN: [id number]  SSN: [id number]   YOB: 1932  Age: 80 y.o. Sex: female      Cardiovascular Function/Vital Signs    BP (!) 166/61   Pulse 69   Temp 36.8 °C (98.2 °F)   Resp 13   Ht 5' 3\" (1.6 m)   Wt 72.1 kg (159 lb)   SpO2 100%   BMI 28.17 kg/m²     Patient is status post Procedure(s):  ESOPHAGOGASTRODUODENOSCOPY (EGD)  ESOPHAGOGASTRODUODENAL (EGD) BIOPSY. Nausea/Vomiting: Controlled. Postoperative hydration reviewed and adequate. Pain:  Pain Scale 1: Numeric (0 - 10) (05/13/21 1002)  Pain Intensity 1: 0 (05/13/21 1002)   Managed. Neurological Status: At baseline. Mental Status and Level of Consciousness: Arousable. Pulmonary Status:   O2 Device: None (Room air) (05/13/21 1002)   Adequate oxygenation and airway patent. Complications related to anesthesia: None    Post-anesthesia assessment completed. No concerns. Signed By: Lezlie Dakin, MD    5/13/2021  Post anesthesia nausea and vomiting:  controlled      INITIAL Post-op Vital signs:   Vitals Value Taken Time   /71 05/13/21 1021   Temp 36.8 °C (98.2 °F) 05/13/21 1002   Pulse 64 05/13/21 1022   Resp 17 05/13/21 1022   SpO2 99 % 05/13/21 1022   Vitals shown include unvalidated device data.

## 2021-05-13 NOTE — PERIOP NOTES
Anesthesia reports 120mg Propofol, 80mg Lidocaine and 100mL NS given during procedure. Received report from anesthesia staff on vital signs and status of patient. Endoscope was pre-cleaned at the bedside immediately following procedure by [facility] JERRY

## 2021-05-13 NOTE — DISCHARGE INSTRUCTIONS
[facility]  [id number]  9/22/1932    EGD DISCHARGE INSTRUCTIONS  Discomfort:  Sore throat- throat lozenges or warm salt water gargle  redness at IV site- apply warm compress to area; if redness or soreness persist- contact your physician  Gaseous discomfort- walking, belching will help relieve any discomfort  You may not operate a vehicle for 12 hours  You may not engage in an occupation involving machinery or appliances for rest of today  You may not drink alcoholic beverages for at least 12 hours  Avoid making any critical decisions for at least 24 hour  DIET  You may have minimal sips at this time-- do not eat or drink for two hours. You may eat and drink after 1015am today  You may resume your regular diet - however -  remember your colon is empty and a heavy meal will produce gas. Avoid these foods:  vegetables, fried / greasy foods, carbonated drinks    MEDICATIONS:        ACTIVITY  You may resume your normal daily activities until tomorrow AM;  Spend the remainder of the day resting -  avoid any strenuous activity. CALL M.D.   ANY SIGN OF   Increasing pain, nausea, vomiting  Abdominal distension (swelling)  New increased bleeding (oral or rectal)  Fever (chills)  Pain in chest area  Bloody discharge from nose or mouth  Shortness of breath    IMPRESSION:  -Normal appearing esophageal mucosa; biopsied to exclude esophagitis  -Small 2cm sliding hiatal hernia from 37-39cm  -Distal gastric erythema (redness) to suggest gastritis; biopsied  -Normal duodenum    Follow-up Instructions:   Call Dr. Cosme Edwards for the results of procedure / biopsy in 7-10 days   Telephone # [phone]  Start omeprazole as prescribed today    Micha Garcia MD

## 2021-05-13 NOTE — H&P
Gastroenterology Outpatient History and Physical    Patient: Marisabel Francois    Physician: Kervin Rutherford MD    Chief Complaint: GERD with epigastric pain  History of Present Illness: 81yo F with GERD with epigastric pain    History:  Past Medical History:   Diagnosis Date    Arthritis     Chronic pain     knee    Depression     Diabetes ([facility]     type 2    GERD (gastroesophageal reflux disease)     H/O seasonal allergies     Hypertension     Osteoporosis     Psychiatric disorder     anxiety      Past Surgical History:   Procedure Laterality Date    HX CHOLECYSTECTOMY      HX ÓSCAR AND BSO      IN COLONOSCOPY FLX DX W/COLLJ SPEC WHEN PFRMD  11/21/2013         IN TOTAL KNEE ARTHROPLASTY Right     IN TOTAL KNEE ARTHROPLASTY Left     UPPER GI ENDOSCOPY,BIOPSY  11/3/2015           Social History     Socioeconomic History    Marital status:      Spouse name: Not on file    Number of children: Not on file    Years of education: Not on file    Highest education level: Not on file   Tobacco Use    Smoking status: Never Smoker    Smokeless tobacco: Never Used   Substance and Sexual Activity    Alcohol use: Yes     Comment: social    Drug use: Never      Family History   Problem Relation Age of Onset    No Known Problems Mother     No Known Problems Father     Arthritis-osteo Child     Hypertension Child       Patient Active Problem List   Diagnosis Code    Primary osteoarthritis of right knee M17.11    Primary localized osteoarthritis of right knee M17.11    Idiopathic small and large fiber sensory neuropathy G60.8    Diabetic peripheral neuropathy associated with type 2 diabetes mellitus ([facility] [address] E11.42    B12 deficiency E53.8    Vitamin D deficiency E55.9    Bilateral carpal tunnel syndrome G56.03    Cervical radiculopathy due to osteoarthritis of spine M47.22    Lumbar back pain with radiculopathy affecting left lower extremity M54.16    Lumbar back pain with radiculopathy affecting right lower extremity M54.16    Status post total left knee replacement Z96.652    Low back pain M54.5       Allergies: Allergies   Allergen Reactions    Codeine Other (comments)     fainting    Bee Pollen Other (comments)     Medications:   Prior to Admission medications    Medication Sig Start Date End Date Taking? Authorizing Provider   FreeStyle Michael 14 Day Sensor kit USE QD UTD 10/18/20  Yes Provider, Historical   acetaminophen (Tylenol Arthritis Pain) 650 mg TbER Take 1 Tab by mouth every eight (8) hours. Patient taking differently: Take 650 mg by mouth every eight (8) hours as needed. 9/25/20  Yes MD Jesús PosadasStyle Michael 14 Day [place] misc USE AS DIRECTED QD 5/19/20  Yes Provider, Historical   metFORMIN ER (GLUCOPHAGE XR) 500 mg tablet Take 500 mg by mouth daily (with dinner). 7/14/20  Yes Provider, Historical   potassium chloride SR (KLOR-CON 10) 10 mEq tablet Take 10 mEq by mouth daily. 6/18/20  Yes Provider, Historical   gabapentin (NEURONTIN) 300 mg capsule Take 1 Cap by mouth four (4) times daily. Max Daily Amount: 1,200 mg. Indications: neuropathic pain 7/24/20  Yes Deja Campos NP   alendronate (FOSAMAX) 70 mg tablet Take 70 mg by mouth every seven (7) days. thursdays 7/8/19  Yes Provider, Historical   b complex vitamins tablet Take 1 Tab by mouth daily. Yes Provider, Historical   cholecalciferol, VITAMIN D3, (VITAMIN D3) 5,000 unit tab tablet Take 5,000 Units by mouth daily. Yes Provider, Historical   cetirizine (ZYRTEC) 10 mg tablet Take 10 mg by mouth daily as needed for Allergies. Yes Provider, Historical   hydrochlorothiazide (HYDRODIURIL) 25 mg tablet Take 1 Tab by mouth daily. Restart when Blood Pressure is greater than 120/80 8/19/16  Yes Scooter Arteaga NP   citalopram (CELEXA) 20 mg tablet Take 20 mg by mouth daily. Yes Provider, Historical   atorvastatin (LIPITOR) 20 mg tablet Take 20 mg by mouth daily.    Yes Provider, Historical   lidocaine (XYLOCAINE) 2 % solution Take 15 mL by mouth as needed for Pain. 2/26/21   Kala Soares MD   methocarbamoL (Robaxin-750) 750 mg tablet Take 1 Tab by mouth four (4) times daily as needed for Pain. 2/25/21   Rosie Rand DO   lidocaine (XYLOCAINE) 2 % solution Take 10 mL by mouth as needed for Pain. 2/25/21   Rosie Rand DO   naproxen (NAPROSYN) 500 mg tablet Take 1 Tab by mouth two (2) times daily (with meals). Patient taking differently: Take 500 mg by mouth two (2) times daily as needed. 2/22/21   Teresa Beebe MD   naproxen (NAPROSYN) 500 mg tablet Take 1 Tab by mouth every twelve (12) hours as needed for Pain. 9/25/20   Kelly Inman MD   cyclobenzaprine (FLEXERIL) 5 mg tablet Take 1 Tab by mouth nightly as needed for Muscle Spasm(s) for up to 10 doses. 9/18/20   JEMMA Roth   tiZANidine (ZANAFLEX) 2 mg tablet Take 2 mg by mouth nightly as needed. 7/16/20   Provider, Historical     Physical Exam:   Vital Signs: Blood pressure (!) 137/100, pulse 84, resp. rate 22, height 5' 3\" (1.6 m), weight 72.1 kg (159 lb), SpO2 99 %.   General: well developed, well nourished   HEENT: unremarkable   Heart: regular rhythm no mumur    Lungs: clear   Abdominal:  benign   Neurological: unremarkable   Extremities: no edema     Findings/Diagnosis: GERD with epigastric pain  Plan of Care/Planned Procedure: EGD with conscious/deep sedation    Signed:  Allie Abernathy MD 5/13/2021

## 2021-05-19 ENCOUNTER — HOSPITAL ENCOUNTER (OUTPATIENT)
Dept: MAMMOGRAPHY | Age: 86
Discharge: HOME | End: 2021-05-19
Attending: FAMILY MEDICINE
Payer: MEDICARE

## 2021-05-19 DIAGNOSIS — Z12.31 VISIT FOR SCREENING MAMMOGRAM: ICD-10-CM

## 2021-05-19 PROCEDURE — 77067 SCR MAMMO BI INCL CAD: CPT

## 2022-03-23 ENCOUNTER — TRANSCRIBE ORDER (OUTPATIENT)
Dept: SCHEDULING | Age: 87
End: 2022-03-23

## 2022-03-23 DIAGNOSIS — Z12.31 VISIT FOR SCREENING MAMMOGRAM: Primary | ICD-10-CM

## 2022-05-13 ENCOUNTER — HOSPITAL ENCOUNTER (OUTPATIENT)
Dept: MRI IMAGING | Age: 87
Discharge: HOME | End: 2022-05-13
Attending: ORTHOPAEDIC SURGERY
Payer: MEDICARE

## 2022-05-13 DIAGNOSIS — S32.020S COMPRESSION FRACTURE OF L2 VERTEBRA, SEQUELA: ICD-10-CM

## 2022-05-13 DIAGNOSIS — M43.10 ACQUIRED SPONDYLOLISTHESIS: ICD-10-CM

## 2022-05-13 DIAGNOSIS — M47.817 LUMBOSACRAL SPONDYLOSIS WITHOUT MYELOPATHY: ICD-10-CM

## 2022-05-13 DIAGNOSIS — S32.030S COMPRESSION FRACTURE OF L3 VERTEBRA, SEQUELA: ICD-10-CM

## 2022-05-13 DIAGNOSIS — M54.50 LOW BACK PAIN, UNSPECIFIED BACK PAIN LATERALITY, UNSPECIFIED CHRONICITY, UNSPECIFIED WHETHER SCIATICA PRESENT: ICD-10-CM

## 2022-05-13 PROCEDURE — 72148 MRI LUMBAR SPINE W/O DYE: CPT

## 2022-05-20 ENCOUNTER — HOSPITAL ENCOUNTER (OUTPATIENT)
Dept: MAMMOGRAPHY | Age: 87
Discharge: HOME | End: 2022-05-20
Attending: FAMILY MEDICINE
Payer: MEDICARE

## 2022-05-20 DIAGNOSIS — Z12.31 VISIT FOR SCREENING MAMMOGRAM: ICD-10-CM

## 2022-05-20 PROCEDURE — 77063 BREAST TOMOSYNTHESIS BI: CPT

## 2022-11-29 ENCOUNTER — TRANSCRIBE ORDER (OUTPATIENT)
Dept: SCHEDULING | Age: 87
End: 2022-11-29

## 2022-11-29 DIAGNOSIS — Z12.31 BREAST CANCER SCREENING BY MAMMOGRAM: Primary | ICD-10-CM

## 2023-01-26 ENCOUNTER — TRANSCRIBE ORDER (OUTPATIENT)
Dept: SCHEDULING | Age: 88
End: 2023-01-26

## 2023-01-26 DIAGNOSIS — Z13.820 OSTEOPOROSIS SCREENING: ICD-10-CM

## 2023-01-26 DIAGNOSIS — Z78.0 POST-MENOPAUSAL: Primary | ICD-10-CM

## 2023-04-22 ENCOUNTER — TRANSCRIBE ORDERS (OUTPATIENT)
Facility: HOSPITAL | Age: 88
End: 2023-04-22

## 2023-04-22 DIAGNOSIS — Z12.31 BREAST CANCER SCREENING BY MAMMOGRAM: Primary | ICD-10-CM

## 2023-04-22 DIAGNOSIS — Z12.31 ENCOUNTER FOR MAMMOGRAM TO ESTABLISH BASELINE MAMMOGRAM: Primary | ICD-10-CM

## 2023-04-22 DIAGNOSIS — Z13.820 OSTEOPOROSIS SCREENING: ICD-10-CM

## 2023-04-22 DIAGNOSIS — Z78.0 POST-MENOPAUSAL: Primary | ICD-10-CM

## 2023-09-01 NOTE — ED PROVIDER NOTES
Angie Medellin is a 80 y.o. female with PMH of HTN, anxiety, GERD, DM, depression, lumbar compression fracture presents to emergency room ambulatory for evaluation of epigastric pain x 6 days, intermittent in nature that occasionally radiates into her chest. She states sx's are present currently, began 30 min ago. She denies F/C, N/V/D. Endorses a normal appetite, no sick contacts. Her last BM Last BM was today, soft. She thinks her BMs have been \"smaller than normal.\" No hx of constipation. Chart review: [facility] at [facility] on 2/[facility], and 2/25 for the same, labs and UA unremarkable, CT on 2/22 showed diverticulosis and tiny hiatal hernia and L2 compression fracture which she is aware of and as managed medically. initially d/c with a muscle relaxor and naprosyn which didn't help, 2nd visit she was dsicharged with pepcid which she endorses taking. PCP: Sidney Anderson MD    Surgical hx- cholecystectomy, ÓSCAR/BSP  Social hx- no tobacco, social ETOH    The patient has no other complaints at this time.              Past Medical History:   Diagnosis Date    Arthritis     Chronic pain     knee    Depression     Diabetes ([address]     GERD (gastroesophageal reflux disease)     Hypertension     Ill-defined condition     seaonal allergies    Osteoporosis     Psychiatric disorder     anxiety       Past Surgical History:   Procedure Laterality Date    HX CHOLECYSTECTOMY      HX ÓSCAR AND BSO      [place] COLONOSCOPY FLX DX W/COLLJ SPEC WHEN PFRMD  11/21/2013         TN TOTAL KNEE ARTHROPLASTY      right    UPPER GI ENDOSCOPY,BIOPSY  11/3/2015              Family History:   Problem Relation Age of Onset    No Known Problems Mother     No Known Problems Father     Heart Disease Child     Arthritis-osteo Child     Hypertension Child        Social History     Socioeconomic History    Marital status:      Spouse name: Not on file    Number of children: Not on file    Years of education: Not on file   [facility] education level: Not on file   Occupational History    Not on file   Social Needs    Financial resource strain: Not on file    Food insecurity     Worry: Not on file     Inability: Not on file    Transportation needs     Medical: Not on file     Non-medical: Not on file   Tobacco Use    Smoking status: Never Smoker    Smokeless tobacco: Never Used   Substance and Sexual Activity    Alcohol use: Yes     Comment: social    Drug use: No    Sexual activity: Not on file   Lifestyle    Physical activity     Days per week: Not on file     Minutes per session: Not on file    Stress: Not on file   Relationships    Social connections     Talks on phone: Not on file     Gets together: Not on file     Attends [employer] service: Not on file     Active member of club or organization: Not on file     Attends meetings of clubs or organizations: Not on file     Relationship status: Not on file    Intimate partner violence     Fear of current or ex partner: Not on file     Emotionally abused: Not on file     Physically abused: Not on file     Forced sexual activity: Not on file   Other Topics Concern    Not on file   Social History Narrative    Not on file         ALLERGIES: Codeine and Bee pollen    Review of Systems   Constitutional: Negative. Negative for activity change, chills, fatigue and unexpected weight change. HENT: Negative for trouble swallowing. Respiratory: Negative for cough, chest tightness, shortness of breath and wheezing. Cardiovascular: Negative. Negative for chest pain and palpitations. Gastrointestinal: Positive for abdominal pain (epigastric). Negative for diarrhea, nausea and vomiting. Genitourinary: Negative. Negative for dysuria, flank pain, frequency and hematuria. Musculoskeletal: Negative. Negative for arthralgias, back pain, neck pain and neck stiffness. Skin: Negative. Negative for color change and rash. Neurological: Negative.   Negative for dizziness, numbness and headaches. All other systems reviewed and are negative. Vitals:    02/27/21 1746   BP: (!) 153/87   Pulse: 78   Resp: 18   Temp: 97.2 °F (36.2 °C)   SpO2: 99%            Physical Exam  Vitals signs and nursing note reviewed. Constitutional:       General: She is not in acute distress. Appearance: She is well-developed. She is not toxic-appearing or diaphoretic. Comments: AA female   HENT:      Head: Normocephalic and atraumatic. Eyes:      General:         Right eye: No discharge. Left eye: No discharge. Conjunctiva/sclera: Conjunctivae normal.      Pupils: Pupils are equal, round, and reactive to light. Neck:      Musculoskeletal: Full passive range of motion without pain and normal range of motion. Trachea: No tracheal tenderness. Cardiovascular:      Rate and Rhythm: Normal rate and regular rhythm. Pulses: Normal pulses. Heart sounds: Normal heart sounds. No murmur. No friction rub. No gallop. Pulmonary:      Effort: Pulmonary effort is normal. No respiratory distress. Breath sounds: Normal breath sounds. No wheezing or rales. Chest:      Chest wall: No tenderness. Abdominal:      General: Bowel sounds are normal. There is no distension. Palpations: Abdomen is soft. Tenderness: There is abdominal tenderness (Mild) in the epigastric area. There is no guarding or rebound. Musculoskeletal: Normal range of motion. General: No tenderness. Skin:     General: Skin is warm and dry. Capillary Refill: Capillary refill takes less than 2 seconds. Findings: No abrasion, erythema or rash. Neurological:      Mental Status: She is alert and oriented to person, place, and time. Cranial Nerves: No cranial nerve deficit. Sensory: No sensory deficit.       Coordination: Coordination normal.   Psychiatric:         Speech: Speech normal.         Behavior: Behavior normal.          MDM  Number of Diagnoses or Management Options  Abdominal pain, epigastric  Hiatal hernia  Diagnosis management comments:   DDx: Electrolyte abnormality, dehydration, constipation, hiatal hernia       Amount and/or Complexity of Data Reviewed  Clinical lab tests: ordered and reviewed  Review and summarize past medical records: yes  Discuss the patient with other providers: yes (ER attending)    Patient Progress  Patient progress: stable         Procedures    I discussed patient's PMH, exam findings as well as careplan with the ER attending who agrees with care plan. Dr. Gerardo Alexander states to repeat EKG in the office, no indication to reimage patient if work-up today is unremarkable, exam is reassuring and patient already had CT scan within the past 5 days with no change or worsening of her symptoms. Michelle Villalobos PA-C     EKG interpretation:   Rhythm: normal sinus rhythm with RBB; and regular . Rate (approx.): 65; Axis: normal; P wave: normal; QRS interval: normal ; ST/T wave: normal; Other findings: unchanged from previous ekg. LABORATORY TESTS:  Recent Results (from the past 12 hour(s))   SAMPLES BEING HELD    Collection Time: 02/27/21  7:12 PM   Result Value Ref Range    SAMPLES BEING HELD 1red,1blue     COMMENT        Add-on orders for these samples will be processed based on acceptable specimen integrity and analyte stability, which may vary by analyte.    CBC WITH AUTOMATED DIFF    Collection Time: 02/27/21  7:12 PM   Result Value Ref Range    WBC 5.1 3.6 - 11.0 K/uL    RBC 5.10 3.80 - 5.20 M/uL    HGB 12.0 11.5 - 16.0 g/dL    HCT 38.5 35.0 - 47.0 %    MCV 75.5 (L) 80.0 - 99.0 FL    MCH 23.5 (L) 26.0 - 34.0 PG    MCHC 31.2 30.0 - 36.5 g/dL    RDW 14.6 (H) 11.5 - 14.5 %    PLATELET 689 894 - 791 K/uL    MPV 10.0 8.9 - 12.9 FL    NRBC 0.0 0  WBC    ABSOLUTE NRBC 0.00 0.00 - 0.01 K/uL    NEUTROPHILS 53 32 - 75 %    LYMPHOCYTES 33 12 - 49 %    MONOCYTES 13 5 - 13 %    EOSINOPHILS 1 0 - 7 %    BASOPHILS 0 0 - 1 %    IMMATURE GRANULOCYTES 0 0.0 - 0.5 %    ABS. NEUTROPHILS 2.7 1.8 - 8.0 K/UL    ABS. LYMPHOCYTES 1.7 0.8 - 3.5 K/UL    ABS. MONOCYTES 0.7 0.0 - 1.0 K/UL    ABS. EOSINOPHILS 0.0 0.0 - 0.4 K/UL    ABS. BASOPHILS 0.0 0.0 - 0.1 K/UL    ABS. IMM. GRANS. 0.0 0.00 - 0.04 K/UL    DF AUTOMATED     METABOLIC PANEL, COMPREHENSIVE    Collection Time: 02/27/21  7:12 PM   Result Value Ref Range    Sodium 138 136 - 145 mmol/L    Potassium 4.9 3.5 - 5.1 mmol/L    Chloride 105 97 - 108 mmol/L    CO2 26 21 - 32 mmol/L    Anion gap 7 5 - 15 mmol/L    Glucose 100 65 - 100 mg/dL    BUN 13 6 - 20 MG/DL    Creatinine 0.70 0.55 - 1.02 MG/DL    BUN/Creatinine ratio 19 12 - 20      GFR est AA >60 >60 ml/min/1.73m2    GFR est non-AA >60 >60 ml/min/1.73m2    Calcium 9.0 8.5 - 10.1 MG/DL    Bilirubin, total 0.4 0.2 - 1.0 MG/DL    ALT (SGPT) 28 12 - 78 U/L    AST (SGOT) 52 (H) 15 - 37 U/L    Alk. phosphatase 74 45 - 117 U/L    Protein, total 8.7 (H) 6.4 - 8.2 g/dL    Albumin 3.8 3.5 - 5.0 g/dL    Globulin 4.9 (H) 2.0 - 4.0 g/dL    A-G Ratio 0.8 (L) 1.1 - 2.2     LIPASE    Collection Time: 02/27/21  7:12 PM   Result Value Ref Range    Lipase 182 73 - 393 U/L   TROPONIN I    Collection Time: 02/27/21  7:12 PM   Result Value Ref Range    Troponin-I, Qt. <0.05 <0.05 ng/mL   EKG, 12 LEAD, INITIAL    Collection Time: 02/27/21  7:12 PM   Result Value Ref Range    Ventricular Rate 65 BPM    Atrial Rate 65 BPM    P-R Interval 168 ms    QRS Duration 122 ms    Q-T Interval 444 ms    QTC Calculation (Bezet) 461 ms    Calculated P Axis 50 degrees    Calculated R Axis -6 degrees    Calculated T Axis 40 degrees    Diagnosis       Normal sinus rhythm  Right bundle branch block  When compared with ECG of 25-FEB-2021 12:13,  No significant change was found         Patient has been re-assessed and feeling much better and GI cocktail. Labs and EKG reassuring and unchanged from previous ED visits this week.  She had a CT 5 days ago that was unremarkable with exception of tiny hiatal hernia and known L2 compression frx. Acute abd series 2 days ago with no acute findings and her sx's are no worse than when they began. Will start on PPI, have her stop muscle relaxant which she states didn't help and have her f/u with GI. Return precautions discussed with patient and daughter. Last Qureshi PA-C      MEDICATIONS GIVEN:  Medications   aluminum-magnesium hydroxide (MAALOX) oral suspension 30 mL (30 mL Oral Given 2/27/21 1933)         DISCHARGE NOTE:  8:11 PM  The patient's results have been reviewed with them and/or available family. Patient and/or family verbally conveyed their understanding and agreement of the patient's signs, symptoms, diagnosis, treatment and prognosis and additionally agree to follow up as recommended in the discharge instructions or to return to the Emergency Room should their condition change prior to their follow-up appointment. The patient/family verbally agrees with the care-plan and verbally conveys that all of their questions have been answered. The discharge instructions have also been provided to the patient and/or family with some educational information regarding the patient's diagnosis as well a list of reasons why the patient would want to return to the ER prior to their follow-up appointment, should their condition change. Plan:  1. F/U with PCP, GI  2. Rx protonix; stop muscle relaxor  3.  Return precautions discussed and advised to return to ER if worse done

## (undated) DEVICE — BAG SPEC BIOHZRD 10 X 10 IN --

## (undated) DEVICE — SOLIDIFIER FLD 2OZ 1500CC N DISINF IN BTL DISP SAFESORB

## (undated) DEVICE — SMOKE EVACUATION PENCIL: Brand: VALLEYLAB

## (undated) DEVICE — GAUZE SPONGES,12 PLY: Brand: CURITY

## (undated) DEVICE — FLOSEAL HEMOSTATIC MATRIX, 10 ML: Brand: FLOSEAL

## (undated) DEVICE — SET ADMIN 16ML TBNG L100IN 2 Y INJ SITE IV PIGGY BK DISP

## (undated) DEVICE — INSTRUMENT BATTERY

## (undated) DEVICE — BANDAGE COMPR SELF ADH 5 YDX4 IN TAN STRL PREMIERPRO LF

## (undated) DEVICE — NEEDLE HYPO 18GA L1.5IN PNK S STL HUB POLYPR SHLD REG BVL

## (undated) DEVICE — BASIN EMSIS 16OZ GRAPHITE PLAS KID SHP MOLD GRAD FOR ORAL

## (undated) DEVICE — INFECTION CONTROL KIT SYS

## (undated) DEVICE — SUTURE VCRL SZ 1 L18IN ABSRB VLT CT-1 L36MM 1/2 CIR J741D

## (undated) DEVICE — SYR 10ML LUER LOK 1/5ML GRAD --

## (undated) DEVICE — DEVON™ KNEE AND BODY STRAP 60" X 3" (1.5 M X 7.6 CM): Brand: DEVON

## (undated) DEVICE — TOWEL 4 PLY TISS 19X30 SUE WHT

## (undated) DEVICE — T4 HOOD

## (undated) DEVICE — Device

## (undated) DEVICE — KENDALL SCD EXPRESS SLEEVES, KNEE LENGTH, MEDIUM: Brand: KENDALL SCD

## (undated) DEVICE — ABDOMINAL PAD: Brand: DERMACEA

## (undated) DEVICE — ELECTRODE,RADIOTRANSLUCENT,FOAM,5PK: Brand: MEDLINE

## (undated) DEVICE — SOLUTION IV 1000ML 0.9% SOD CHL

## (undated) DEVICE — PADDING CST 6IN STERILE --

## (undated) DEVICE — SLIM BODY SKIN STAPLER: Brand: APPOSE ULC

## (undated) DEVICE — 1200 GUARD II KIT W/5MM TUBE W/O VAC TUBE: Brand: GUARDIAN

## (undated) DEVICE — CATH IV AUTOGRD BC PNK 20GA 25 -- INSYTE

## (undated) DEVICE — DRSG PATCH ANTIMIC 1INX4.0MM -- CONVERT TO ITEM 356053

## (undated) DEVICE — STRYKER PERFORMANCE SERIES SAGITTAL BLADE: Brand: STRYKER PERFORMANCE SERIES

## (undated) DEVICE — OCCLUSIVE GAUZE STRIP,3% BISMUTH TRIBROMOPHENATE IN PETROLATUM BLEND: Brand: XEROFORM

## (undated) DEVICE — Z DISCONTINUED PER MEDLINE LINE GAS SAMPLING O2/CO2 LNG AD 13 FT NSL W/ TBNG FILTERLINE

## (undated) DEVICE — YANKAUER,TAPERED BULBOUS TIP,W/O VENT: Brand: MEDLINE

## (undated) DEVICE — GOWN,SIRUS,NONRNF,SETINSLV,XL,20/CS: Brand: MEDLINE

## (undated) DEVICE — STERILE POLYISOPRENE POWDER-FREE SURGICAL GLOVES: Brand: PROTEXIS

## (undated) DEVICE — HANDPIECE SET WITH COAXIAL HIGH FLOW TIP AND SUCTION TUBE: Brand: INTERPULSE

## (undated) DEVICE — 3M™ IOBAN™ 2 ANTIMICROBIAL INCISE DRAPE 6651EZ: Brand: IOBAN™ 2

## (undated) DEVICE — SUTURE VCRL SZ 2-0 L18IN ABSRB UD CT-1 L36MM 1/2 CIR J839D

## (undated) DEVICE — FORCEPS BX L160CM DIA8MM GRSP DISECT CUP TIP NONLOCKING ROT

## (undated) DEVICE — ZIMMER® STERILE DISPOSABLE TOURNIQUET CUFF WITH PROTECTIVE SLEEVE AND PLC, DUAL PORT, SINGLE BLADDER, 34 IN. (86 CM)

## (undated) DEVICE — HOOD: Brand: FLYTE

## (undated) DEVICE — BLOCK BITE ENDOSCP AD 21 MM W/ DIL BLU LF DISP

## (undated) DEVICE — MEDI-VAC SUCTION HIGH CAPACITY: Brand: CARDINAL HEALTH

## (undated) DEVICE — CEMENT MIXING SYSTEM WITH FEMORAL BREAKWAY NOZZLE: Brand: REVOLUTION

## (undated) DEVICE — (D)PREP SKN CHLRAPRP APPL 26ML -- CONVERT TO ITEM 371833

## (undated) DEVICE — NEONATAL-ADULT SPO2 SENSOR: Brand: NELLCOR

## (undated) DEVICE — SYR 50ML LR LCK 1ML GRAD NSAF --

## (undated) DEVICE — SOLUTION IRRIG 3000ML 0.9% SOD CHL FLX CONT 0797208] ICU MEDICAL INC]

## (undated) DEVICE — HOOK LOCK LATEX FREE ELASTIC BANDAGE D/L 6INX10YD

## (undated) DEVICE — SOLUTION IRRIG 1000ML H2O STRL BLT

## (undated) DEVICE — HANDLE LT SNAP ON ULT DURABLE LENS FOR TRUMPF ALC DISPOSABLE

## (undated) DEVICE — SYR 3ML LL TIP 1/10ML GRAD --

## (undated) DEVICE — CONTAINER SPEC 20 ML LID NEUT BUFF FORMALIN 10 % POLYPR STS

## (undated) DEVICE — REM POLYHESIVE ADULT PATIENT RETURN ELECTRODE: Brand: VALLEYLAB